# Patient Record
Sex: MALE | Race: WHITE | NOT HISPANIC OR LATINO | Employment: UNEMPLOYED | URBAN - METROPOLITAN AREA
[De-identification: names, ages, dates, MRNs, and addresses within clinical notes are randomized per-mention and may not be internally consistent; named-entity substitution may affect disease eponyms.]

---

## 2024-05-09 ENCOUNTER — HOSPITAL ENCOUNTER (EMERGENCY)
Facility: HOSPITAL | Age: 59
Discharge: HOME | End: 2024-05-09
Attending: EMERGENCY MEDICINE
Payer: MEDICAID

## 2024-05-09 ENCOUNTER — APPOINTMENT (OUTPATIENT)
Dept: RADIOLOGY | Facility: HOSPITAL | Age: 59
End: 2024-05-09
Payer: MEDICAID

## 2024-05-09 VITALS
OXYGEN SATURATION: 95 % | HEART RATE: 94 BPM | SYSTOLIC BLOOD PRESSURE: 147 MMHG | WEIGHT: 174 LBS | TEMPERATURE: 97.9 F | RESPIRATION RATE: 16 BRPM | DIASTOLIC BLOOD PRESSURE: 106 MMHG

## 2024-05-09 DIAGNOSIS — S22.41XG: ICD-10-CM

## 2024-05-09 DIAGNOSIS — S63.259A DISLOCATION OF FINGER, INITIAL ENCOUNTER: Primary | ICD-10-CM

## 2024-05-09 PROCEDURE — 73130 X-RAY EXAM OF HAND: CPT | Mod: RT,77

## 2024-05-09 PROCEDURE — 99284 EMERGENCY DEPT VISIT MOD MDM: CPT

## 2024-05-09 PROCEDURE — 64450 NJX AA&/STRD OTHER PN/BRANCH: CPT | Mod: F7 | Performed by: EMERGENCY MEDICINE

## 2024-05-09 PROCEDURE — 71101 X-RAY EXAM UNILAT RIBS/CHEST: CPT | Mod: RT

## 2024-05-09 PROCEDURE — 64450 NJX AA&/STRD OTHER PN/BRANCH: CPT | Mod: F7

## 2024-05-09 PROCEDURE — 73130 X-RAY EXAM OF HAND: CPT | Mod: RIGHT SIDE | Performed by: INTERNAL MEDICINE

## 2024-05-09 PROCEDURE — 64450 NJX AA&/STRD OTHER PN/BRANCH: CPT | Performed by: EMERGENCY MEDICINE

## 2024-05-09 PROCEDURE — 73130 X-RAY EXAM OF HAND: CPT | Mod: RT

## 2024-05-09 PROCEDURE — 73140 X-RAY EXAM OF FINGER(S): CPT | Mod: RT

## 2024-05-09 PROCEDURE — 71101 X-RAY EXAM UNILAT RIBS/CHEST: CPT | Mod: RIGHT SIDE | Performed by: INTERNAL MEDICINE

## 2024-05-09 PROCEDURE — 99285 EMERGENCY DEPT VISIT HI MDM: CPT | Mod: 25

## 2024-05-09 PROCEDURE — 99285 EMERGENCY DEPT VISIT HI MDM: CPT | Performed by: EMERGENCY MEDICINE

## 2024-05-09 PROCEDURE — 2500000001 HC RX 250 WO HCPCS SELF ADMINISTERED DRUGS (ALT 637 FOR MEDICARE OP): Mod: SE | Performed by: EMERGENCY MEDICINE

## 2024-05-09 RX ORDER — LORAZEPAM 0.5 MG/1
1 TABLET ORAL EVERY 2 HOUR PRN
Status: DISCONTINUED | OUTPATIENT
Start: 2024-05-09 | End: 2024-05-09

## 2024-05-09 RX ORDER — LORAZEPAM 0.5 MG/1
0.5 TABLET ORAL EVERY 2 HOUR PRN
Status: DISCONTINUED | OUTPATIENT
Start: 2024-05-09 | End: 2024-05-09

## 2024-05-09 RX ORDER — LIDOCAINE HYDROCHLORIDE 10 MG/ML
5 INJECTION INFILTRATION; PERINEURAL ONCE
Status: DISCONTINUED | OUTPATIENT
Start: 2024-05-09 | End: 2024-05-10 | Stop reason: HOSPADM

## 2024-05-09 RX ORDER — OXYCODONE AND ACETAMINOPHEN 5; 325 MG/1; MG/1
1 TABLET ORAL EVERY 6 HOURS PRN
Qty: 4 TABLET | Refills: 0 | Status: SHIPPED | OUTPATIENT
Start: 2024-05-09 | End: 2024-05-12

## 2024-05-09 RX ORDER — OXYCODONE AND ACETAMINOPHEN 5; 325 MG/1; MG/1
1 TABLET ORAL ONCE
Status: COMPLETED | OUTPATIENT
Start: 2024-05-09 | End: 2024-05-09

## 2024-05-09 RX ORDER — LORAZEPAM 0.5 MG/1
2 TABLET ORAL EVERY 2 HOUR PRN
Status: DISCONTINUED | OUTPATIENT
Start: 2024-05-09 | End: 2024-05-09

## 2024-05-09 RX ADMIN — OXYCODONE HYDROCHLORIDE AND ACETAMINOPHEN 1 TABLET: 5; 325 TABLET ORAL at 16:14

## 2024-05-09 ASSESSMENT — LIFESTYLE VARIABLES
TOTAL SCORE: 0
EVER HAD A DRINK FIRST THING IN THE MORNING TO STEADY YOUR NERVES TO GET RID OF A HANGOVER: NO
HAVE PEOPLE ANNOYED YOU BY CRITICIZING YOUR DRINKING: NO
HAVE YOU EVER FELT YOU SHOULD CUT DOWN ON YOUR DRINKING: NO
EVER FELT BAD OR GUILTY ABOUT YOUR DRINKING: NO

## 2024-05-09 ASSESSMENT — COLUMBIA-SUICIDE SEVERITY RATING SCALE - C-SSRS
6. HAVE YOU EVER DONE ANYTHING, STARTED TO DO ANYTHING, OR PREPARED TO DO ANYTHING TO END YOUR LIFE?: NO
1. IN THE PAST MONTH, HAVE YOU WISHED YOU WERE DEAD OR WISHED YOU COULD GO TO SLEEP AND NOT WAKE UP?: NO
2. HAVE YOU ACTUALLY HAD ANY THOUGHTS OF KILLING YOURSELF?: NO

## 2024-05-09 ASSESSMENT — PAIN DESCRIPTION - LOCATION: LOCATION: FINGER (COMMENT WHICH ONE)

## 2024-05-09 ASSESSMENT — PAIN - FUNCTIONAL ASSESSMENT: PAIN_FUNCTIONAL_ASSESSMENT: 0-10

## 2024-05-09 ASSESSMENT — ENCOUNTER SYMPTOMS: CONSTITUTIONAL NEGATIVE: 1

## 2024-05-09 ASSESSMENT — PAIN SCALES - GENERAL: PAINLEVEL_OUTOF10: 4

## 2024-05-09 NOTE — ED PROCEDURE NOTE
Procedure  Digital Block    Performed by: Ilda Castañeda MD  Authorized by: Ilda Castañeda MD    Consent:     Consent obtained:  Verbal    Consent given by:  Patient    Risks, benefits, and alternatives were discussed: yes      Risks discussed:  Pain, unsuccessful block, swelling, nerve damage, infection, bleeding, intravascular injection and allergic reaction    Alternatives discussed:  Alternative treatment and no treatment  Universal protocol:     Procedure explained and questions answered to patient or proxy's satisfaction: yes      Imaging studies available: yes      Patient identity confirmed:  Verbally with patient  Indications:     Indications:  Pain relief  Location:     Block location:  Finger    Finger blocked:  R long finger  Pre-procedure details:     Neurovascular status: intact      Skin preparation:  Alcohol  Procedure details:     Anesthetic injected:  Lidocaine 1% w/o epi    Technique:  Metacarpal block    Injection procedure:  Anatomic landmarks identified and negative aspiration for blood  Post-procedure details:     Outcome:  Pain relieved    Procedure completion:  Tolerated well, no immediate complications               Ilda Castañeda MD  05/09/24 8405

## 2024-05-09 NOTE — ED PROVIDER NOTES
Limitations to History: none.  External Records Reviewed: chart reviewed.  Independent Historians: none.    HPI  Everton Geiger is a 58 y.o. male with a history of alcohol use disorder presenting with right middle finger deformity x 1.5 weeks. Patient states that 1.5 weeks ago, he woke from a blackout from drinking alcohol, and his right middle finger was swollen and painful. Patient thinks he may have jammed his finger on his metal bed frame. Patient endorses some paresthesias in the right middle finger as well as pain with palpation, but denies any pain at rest. Patient states that the amount of swelling has decreased in his right finger, though he remains unable to fully move / bend the joint. Patient also states that 1.5 weeks ago, he woke with right sided pain along his ribs that is now improving. He has not yet been seen for his symptoms prior to today. He denies any headache, dizziness, vision changes, neck pain, back pain, shortness of breath, nausea, vomiting, abdominal pain, diarrhea, constipation, urinary symptoms, fevers, or chills.    [facility]. He denies any suicidal or homicidal ideation. He reports that he feels safe at home.    [facility]  Past Medical History:   Diagnosis Date    Other specified health status     Known health problems: none       Meds  Current Outpatient Medications   Medication Instructions    oxyCODONE-acetaminophen (Percocet) 5-325 mg tablet 1 tablet, oral, Every 6 hours PRN       Allergies  No Known Allergies     SHx       ------------------------------------------------------------------------------------------------------------------------------------------    BP (!) 147/106 (BP Location: Right arm, Patient Position: Sitting)   Pulse 94   Temp 36.6 °C (97.9 °F)   Resp 16   Wt 78.9 kg (174 lb)   SpO2 95%     Physical Exam  Vitals and nursing note reviewed.   Constitutional:       General: He is not in acute distress.      Appearance: He is well-developed. He is not toxic-appearing.   HENT:      Head: Normocephalic and atraumatic.      Mouth/Throat:      Mouth: Mucous membranes are moist.   Eyes:      Extraocular Movements: Extraocular movements intact.      Conjunctiva/sclera: Conjunctivae normal.   Cardiovascular:      Rate and Rhythm: Normal rate and regular rhythm.      Pulses: Normal pulses.      Heart sounds: No murmur heard.  Pulmonary:      Effort: Pulmonary effort is normal. No respiratory distress.      Breath sounds: Normal breath sounds. No wheezing.   Abdominal:      General: There is no distension.      Palpations: Abdomen is soft.      Tenderness: There is no abdominal tenderness. There is no guarding.   Musculoskeletal:         General: Swelling, tenderness and deformity present.      Cervical back: Normal range of motion and neck supple. No tenderness.      Comments: Tenderness to palpation, ecchymosis, swelling over R middle finger's DIP joint with visible closed deformity. No snuffbox or wrist tenderness. Moves all extremities spontaneously. Compartments are soft. 2+ radial pulses bilaterally. Some right sided rib tenderness.   Skin:     General: Skin is dry.      Capillary Refill: Capillary refill takes less than 2 seconds.   Neurological:      General: No focal deficit present.      Mental Status: He is alert.      Cranial Nerves: No cranial nerve deficit.      Motor: No weakness.      Comments: Strength 5/5 in upper and lower extremities bilaterally. Sensation intact to light touch throughout.    Psychiatric:         Mood and Affect: Mood normal.      Comments: Denies suicidal or homicidal ideation. Calm and cooperative.          ------------------------------------------------------------------------------------------------------------------------------------------    Medical Decision Makin y.o. male who presents to the ED with a R middle finger deformity that has been present 1.5 weeks. In the Emergency  Department, hospital records were reviewed. The patient is afebrile with stable vital signs. The patient is in no respiratory distress, satting well on room air. Differential diagnosis is broad and includes but not limited to fracture, dislocation, sprain, or contusion. Also on the differential included gout or septic arthritis, however patient has no history of gout, denies any fever, and has no erythema or overlying warmth. The acuity of the swelling / pain is more suggestive of a traumatic injury. Patient is neurologically intact. Plan to obtain x-rays of right hand for further evaluation. Additionally, plan to obtain x-ray of right ribs to assess for a fracture given his right rib pain. Patient initially declined needing any pain medication at this time.     Right hand x-ray showed near full shaft width dorsal and ulnar subluxation of the  3rd middle phalanx as compared to the proximal phalanx. Patient later was agreeable to pain medication and was given 1 Percocet to treat his pain. After verbal consent was obtained from the patient, we attempted to reduce the patient's right middle finger after digital block was performed. However despite attempt, the finger was unable to be reduced. We thus consulted orthopedic (hand) for patient evaluation. Additionally, x-ray of ribs showed R sided rib fractures on ribs 8-11, so will consult trauma surgery  given mechanism of injury.    Patient was informed of the results. He remains stable. He was signed out to oncoming ED team at 5 PM pending orthopedic and trauma evaluation and recommendations and pending remainder of patient's ED course and final disposition.    ED Course as of 05/10/24 0652   Thu May 09, 2024   1802 Received sign-out from medical student, Charisse Scott, pending orthopedic hand and trauma surgery recommendations. [ES]   1934 Orthopedics report inability to reduce finger at this time. Recommends patient follow-up with Dr. Goodwin, hand surgeon next week,  utilize splint for comfort, and limit use of finger to non-weight bearing. [ES]      ED Course User Index  [ES] Domenico Grewal MD         Diagnoses as of 05/10/24 0652   Dislocation of finger, initial encounter   Closed fracture of four ribs with delayed healing, right       Discussed with attending physician, Dr. Castañeda.       Charisse Scott, MS4 AI     Charisse Scott  05/09/24 1800       Charisse Scott  05/09/24 1806      Charisse Scott  05/10/24 0652       lIda Castañeda MD  05/10/24 0655

## 2024-05-09 NOTE — DISCHARGE INSTRUCTIONS
Seek immediate medical attention for:  Redness, swelling, shortness of breath, fevers, or any worsening or new concerning symptoms.

## 2024-05-09 NOTE — Clinical Note
Orthopaedic Surgery Consult H&P    HPI:   Orthopaedic Problems/Injuries: R middle finger dorsal PIP dislocation  Other Injuries: None    58 y.o. male LHD PMH alcoholism presents after L ring fin sustaining above. ***Denies numbness, tingling, and open wounds on the affected limb.     PMH: per above/EMR  PSH: per above/EMR  SocHx:      - *** Denies tobacco use      - *** Denies EtOH use      - *** Denies other drug use  FamHx:  Non-contributory to this patient's acute orthopaedic problem.   Allergies: Reviewed in EMR  Meds: Reviewed in EMR    ROS      - 14 point ROS negative except as above    Physical Exam:  Gen: AOx3, NAD  HEENT: normocephalic atraumatic  Psych: appropriate mood and affect  Resp: nonlabored breathing  Cardiac: Extremities WWP, RRR to peripheral palpation  Neuro: CN 2-12 grossly intact  Skin: no rashes    *** Hand:  - Skin: intact  - Painful at site of injury  - SILT M/U/R  - RoM: full pronation/supination  - Fires AIN/PIN/Ulnar distributions  - Fingertips pink/warm, cap refill < 2sec  - 2+ radial pulse  - Minimal pain with passive stretch of fingers  - Hand and Forearm compartments compressible  - No fusiform digital swelling noted  - No scissoring noted with full fist    A full secondary exam was performed and all relevant findings discussed and noted above.    Imaging:  AP and lateral radiographs of the *** display ***.    CT *** displays ***.    Assessment:  Orthopaedic Problems/Injuries: ***    ***    Plan:  - ***  - WB: ***  - Abx: ***  - Diet: ***  - DVT: {Ortho DVT:[id number]}  - Please obtain all preop labs (CBC,BMP,EKG, CXR, Coags, Type and Screen)  - Lange: ***    - Dispo: ***    Dwayne Goldman MD  PGY-3 Orthopaedic Surgery  On-call Resident    This patient was seen and staffed within 30 minutes of initial consult.  _________________________________________________________    This patient will be followed by the *** Team while inpatient. See team members and contacts below:

## 2024-05-09 NOTE — PROGRESS NOTES
Emergency Medicine Transition of Care Note.    I received Everton Geiger in signout from medical student, Charisse Scott.  Please see the previous ED provider note for all HPI, PE and MDM up to the time of signout at 1802. This is in addition to the primary record.    In brief Everton Geiger is an 58 y.o. male presenting for   Chief Complaint   Patient presents with    Hand Pain     At the time of signout we were awaiting: orthopedic hand and trauma surgery recommendations    ED Course as of 05/10/24 1338   Thu May 09, 2024   1802 Received sign-out from medical student, Charisse Scott, pending orthopedic hand and trauma surgery recommendations. [ES]   1934 Orthopedics report inability to reduce finger at this time. Recommends patient follow-up with Dr. Goodwin, hand surgeon next week, utilize splint for comfort, and limit use of finger to non-weight bearing. [ES]      ED Course User Index  [ES] Domenico Grewal MD         Diagnoses as of 05/10/24 1338   Dislocation of finger, initial encounter   Closed fracture of four ribs with delayed healing, right       Medical Decision Making  Spoke with orthopedic hand surgical residents who did attempt to reduce at bedside with intra-articular lidocaine numbing without successful reduction.  They recommended patient follow-up with hand surgeon, Dr. Goodwin, in 1 week for plan for surgery.  Patient's tungsten ring was successfully removed in the ED.  Further recommendations per ED course.  Spoke with trauma surgery regarding multiple rib fractures on the right in which they recommend prescribing 4-7 Percocet tabs as needed for pain.  Spoke with patient and updated on recommendations in which she reports he does not necessarily need anything for pain as he has been tolerating well.  Agrees to excepting the prescription to his pharmacy but states he may not fill this prescription.  Fall did happen 1.5 weeks ago.  Agrees to follow-up with hand surgeon Dr. Goodwin.  He will continue to  utilize Tylenol and Motrin as needed for pain and limit use of dislocated right finger.  Strict return precautions provided.  Case and plan discussed in its entirety with attending, Dr. Cortes.        Final diagnoses:   [Z23.669Y] Dislocation of finger, initial encounter           Procedure  Procedures    Domenico Grewal MD

## 2024-05-10 NOTE — H&P
[facility]  TRAUMA SERVICE - HISTORY AND PHYSICAL / CONSULT    Patient Name: Everton Geiger  MRN: [id number]  Admit Date: 509  : 1965  AGE: 58 y.o.   GENDER: male  ==============================================================================  MECHANISM OF INJURY / CHIEF COMPLAINT:   Pt is a 59 y/o male that present to the ED for R middle finger deformity. Pt states that he drunk and fell about 1.5 weeks ago. Pt stated that he is not SOB. Pain prior to the ED is 3 out 10, but now is in no pain. He assumed that he fell out of bed. Pt denies any other complaints.       LOC (yes/no?): unknown  Anticoagulant / Anti-platelet Rx? (for what dx?): No  Referring Facility Name (N/A for scene EMR run): n/a    INJURIES:   Rib fracture   Mildly displaced nonsegmental fractures involving the anterolateral aspect of the right 8th through 11th ribs  Remote fracture deformities of the left 5th through 8th ribs  No SOB, pain 3 out of 10, vitally stable and on room air  No ISS needed as patient is 1.5 weeks out from fall  No other injuries noted on exam  Pain control: Percocet 5 mg X 5 doses  Full shaft width dorsal and ulnar subluxation of the 3rd middle phalanx  Ortho attempted to reduce in ED   Outpatient OR follow up    Trauma surgery will be signing off. Please consult again if anything else is needed.    Disposition: Home     OTHER MEDICAL PROBLEMS:  none    INCIDENTAL FINDINGS:  N/a    ==============================================================================  ADMISSION PLAN OF CARE:  Full shaft width dorsal and ulnar subluxation of the 3rd middle phalanx    Consultants notified (specialty, provider name, time): Ortho    Disposition: Home    ==============================================================================  PAST MEDICAL HISTORY:   PMH: polysubstance use  Past Medical History:   Diagnosis Date    Other specified health status     Known health problems: none     Last  menstrual period: n/a    PSH:   No past surgical history on file.  FH:   No family history on file.  SOCIAL HISTORY:    Smoking: n/a  Social History     Tobacco Use   Smoking Status Not on file   Smokeless Tobacco Not on file       Alcohol: known ETOH use   Social History     Substance and Sexual Activity   Alcohol Use Not on file       Drug use: none    MEDICATIONS: none  Prior to Admission medications    Not on File     ALLERGIES: none  No Known Allergies    REVIEW OF SYSTEMS:  Review of Systems   Constitutional: Negative.      Primary Survey  A: Intact airway  B: Equal breath sounds bilaterally  C: 2+ distal pulses palpable in radials, femorals and DP/PTs bilaterally  D: GCS 15, THOMPSON x4 without deficit, sensory grossly intact  E: No rashes, lacerations or bruises    Secondary Survey  NEURO: A&O x3, CN II-XII intact, THOMPSON equally, muscle strength 5/5, no sensory deficits  HEAD: NCAT, no bony step offs, midface stable.   EENT: PERRL, EOMI, external ear without laceration, nasal septum midline, no crepitus or septal hematoma, oral mucosa and tongue without lacerations, teeth in place.   NECK: No cervical spine tenderness or step offs, no lacerations or abrasions, tracheal midline, no JVD, C-collar replaced during exam until radiographic evidence of no traumatic injury could be determined  RESPIRATORY/CHEST: No ecchymosis or abrasions, no crepitus or tenderness to palpation, non-labored, equal chest expansion, CTAB  CV: RRR, nml S1 and S2, no M/R/G, peripheral pulses: 2+ radial, 2+DP, 2+PT,  2+femoral  ABDOMEN: Soft, nontender, nondistended, no scars, abrasions or lacerations.  PELVIS: Stable to compression, no pain with pelvic rocking, no hematomas or ecchymoses along the pelvic girdle   : Normal external genitalia, no blood at urethral meatus, no perineal hematoma  RECTAL: Rectal tone intact with no gross blood noted on exam  BACK/SPINE: No midline thoracic tenderness, no midline lumbar tenderness, no appreciable  "step-offs or bony deformities, no abrasions, hematomas or lacerations noted  EXTREMITIES: No edema or cyanosis, normal ROM w/o pain, no deformities, lacerations or contusions. Right 3rd middle phalanx swelling, splint in placed.     IMAGING SUMMARY:  (summary of findings, not a copy of dictation)    CXR/PXR: Mildly displaced nonsegmental fractures involving the anterolateral aspect of the right 8th through 11th ribs. Remote fracture deformities of the left 5th through 8th ribs.    Xray right hand, finger and hand:  There is a near full shaft width dorsal and ulnar subluxation of the 3rd middle phalanx as compared to the proximal phalanx. Mild foreshortening of the 3rd finger is noted.    LABS:                No lab exists for component: \"LABALBU\"            I have reviewed all laboratory and imaging results ordered/pertinent for this encounter.      "

## 2024-05-10 NOTE — CONSULTS
ORTHOPAEDIC CONSULTATION     Subjective   History Of Present Illness   58M D PMH EtOH abuse presents 1.5 weeks after dislocating finger metal bed frame.    Orthopaedic Problems/Injuries:  R middle finger dorsal PIP dislocation     Other Injuries: None    Past medical history: per HPI; no history of blood clots  Past surgical history: per HPI, rest reviewed in EMR  Allergies: per EMR  Medications: per EMR  Social History: No smoking, Yes - EtOH abuse +drinking, denies IVDU  Family History:  Non-contributory to this patient's acute surgical issue.    Review of Systems: 12 point ROS negative unless stated in HPI    Past Medical History  He has a past medical history of Other specified health status.    Surgical History  He has no past surgical history on file.     Social History  He has no history on file for tobacco use, alcohol use, and drug use.    Family History  No family history on file.     Allergies  Patient has no known allergies.    Review of Systems  12 point ROS negative unless stated in HPI     Objective   Physical Exam  - Constitutional: No acute distress, cooperative  - Eyes: EOM grossly intact  - Head/Neck: Trachea midline  - Respiratory/Thorax: NWOB  - Cardiovascular: RRR on peripheral palpation  - Gastrointestinal: Nondistended  - Psychological: Appropriate mood/behavior  - Skin: Warm and dry. Additional findings in musculoskeletal evaluation  - Musculoskeletal:  ---  Right upper extremity:  - Closed injury  - R middle finger dorsal PIP dislocation with swelling/brusing  - Motor/sensory intact distal to PIP dislocation  - Motor and sensation intact M/U/R /AIN/PIN distributions  - Palpable radial pulse  - Cap refill < 2 seconds in all digits     Last Recorded Vitals  Blood pressure (!) 147/106, pulse 94, temperature 36.6 °C (97.9 °F), resp. rate 16, weight 78.9 kg (174 lb), SpO2 95%.    Relevant Results  No results found for this or any previous visit (from the past 24 hour(s)).    Images:  XR with R  middle finger dorsal PIP dislocation     Assessment/Plan   58M D PMH EtOH abuse presents 1.5 weeks after dislocating finger metal bed frame. Exam: closed. NVI. No skin tenting, Unsuccessful reduction with lidocaine block + fluoro. Ring removed off of ring finger. Alumafoam splint in situ.     Plan:   - FU OP next week with Dr. Goodwin for surgical management.    - Maintain alumifoam splint in situ  - ED to remove ring on adjacent finger prior to discharge due to concern for swelling  - NWB right hand  - okay for DVT ppx from orthopedic perspective  - Patient to follow up in clinic with Dr. Goodwin in 1 weeks.  Please call [phone] to schedule appointment after discharge.    Consult seen and staffed within 30 minutes of notification.    This consult was staffed with attending physician, Buddy.    Segun Shannon MD  Orthopaedic Surgery PGY-1  On-call pager [phone]

## 2024-05-13 ENCOUNTER — OFFICE VISIT (OUTPATIENT)
Dept: ORTHOPEDIC SURGERY | Facility: CLINIC | Age: 59
End: 2024-05-13
Payer: MEDICAID

## 2024-05-13 VITALS — WEIGHT: 174 LBS | HEIGHT: 69 IN | BODY MASS INDEX: 25.77 KG/M2

## 2024-05-13 DIAGNOSIS — S63.289A DISLOCATION OF PROXIMAL INTERPHALANGEAL JOINT OF FINGER, INITIAL ENCOUNTER: Primary | ICD-10-CM

## 2024-05-13 PROCEDURE — 99203 OFFICE O/P NEW LOW 30 MIN: CPT | Performed by: PHYSICIAN ASSISTANT

## 2024-05-13 PROCEDURE — 1036F TOBACCO NON-USER: CPT | Performed by: PHYSICIAN ASSISTANT

## 2024-05-13 ASSESSMENT — PAIN - FUNCTIONAL ASSESSMENT: PAIN_FUNCTIONAL_ASSESSMENT: NO/DENIES PAIN

## 2024-05-13 NOTE — PROGRESS NOTES
58-year-old left-hand-dominant male presents to clinic today for emergency room follow-up after right middle finger injury he is unsure of specifically how it he injured the finger however believes he fell.  He did not present to the emergency room until approximately 2 weeks after the injury.  X-rays were obtained and revealed a dorsal PIP joint dislocation of the right middle finger.  Attempted closed reduction was performed and unsuccessful.  He presents to our office today for follow-up.  He has been wearing an AlumaFoam splint.  Continued swelling and pain of the digit.    Patient's self reported past medical history, medications, allergies, surgical history, family and social history as well as a 10 point review of systems has been documented in the new patient intake form and scanned into the patient's electronic medical record. Pertinent findings are documented in the HPI.    Physical Examination Findings:  Constitutional: Appears well-developed and well-nourished.  Head: Normocephalic and atraumatic.  Eyes: Pupils are equal and round.  Cardiovascular: Intact distal pulses.   Respiratory: Effort normal. No respiratory distress.  Neurologic: Alert and oriented to person, place, and time.  Skin: Skin is warm and dry.  Hematologic / Lymphatic: No lymphedema, lymphangitis.  Psychiatric: normal mood and affect. Behavior is normal.   Musculoskeletal:  Right middle finger with fusiform swelling of the digit.  Limitations to finger flexion.  Remains in extension.  Pain with palpation of the PIP joint.    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  Unfortunately given the time from this injury and unsuccessful closed reduction recommendation would be for surgical intervention which would  include open reduction internal fixation of the right middle finger.  This would also require K wire placement.  This was discussed in detail today with the patient.  We did discuss after surgical intervention that he will likely have a lot of stiffness of this joint and will require therapy.  His finger will not go back to what it was prior to this injury and he will lose flexion of the PIP joint our hope is that he regained some of this.  All of his questions were answered in detail.  We will schedule him for surgery with Dr. Goodwin this upcoming Friday for open reduction internal fixation with K wire placement of the right middle finger.    We discussed risks, benefits, alternatives and anticipated post op course including time away from work and activities following surgical treatment for the patient's condition. This is major surgery with identifiable risks. No guarantees for success have been provided. The patient has expressed understanding and has elected to pursue operative treatment.    Irma Rodriguez PA-C  Department of Orthopaedic Surgery  [facility]    Dictation performed with the use of voice recognition software. Syntax and grammatical errors may exist.

## 2024-05-13 NOTE — H&P (VIEW-ONLY)
58-year-old left-hand-dominant male presents to clinic today for emergency room follow-up after right middle finger injury he is unsure of specifically how it he injured the finger however believes he fell.  He did not present to the emergency room until approximately 2 weeks after the injury.  X-rays were obtained and revealed a dorsal PIP joint dislocation of the right middle finger.  Attempted closed reduction was performed and unsuccessful.  He presents to our office today for follow-up.  He has been wearing an AlumaFoam splint.  Continued swelling and pain of the digit.    Patient's self reported past medical history, medications, allergies, surgical history, family and social history as well as a 10 point review of systems has been documented in the new patient intake form and scanned into the patient's electronic medical record. Pertinent findings are documented in the HPI.    Physical Examination Findings:  Constitutional: Appears well-developed and well-nourished.  Head: Normocephalic and atraumatic.  Eyes: Pupils are equal and round.  Cardiovascular: Intact distal pulses.   Respiratory: Effort normal. No respiratory distress.  Neurologic: Alert and oriented to person, place, and time.  Skin: Skin is warm and dry.  Hematologic / Lymphatic: No lymphedema, lymphangitis.  Psychiatric: normal mood and affect. Behavior is normal.   Musculoskeletal:  Right middle finger with fusiform swelling of the digit.  Limitations to finger flexion.  Remains in extension.  Pain with palpation of the PIP joint.    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  Unfortunately given the time from this injury and unsuccessful closed reduction recommendation would be for surgical intervention which would  include open reduction internal fixation of the right middle finger.  This would also require K wire placement.  This was discussed in detail today with the patient.  We did discuss after surgical intervention that he will likely have a lot of stiffness of this joint and will require therapy.  His finger will not go back to what it was prior to this injury and he will lose flexion of the PIP joint our hope is that he regained some of this.  All of his questions were answered in detail.  We will schedule him for surgery with Dr. Goodwin this upcoming Friday for open reduction internal fixation with K wire placement of the right middle finger.    We discussed risks, benefits, alternatives and anticipated post op course including time away from work and activities following surgical treatment for the patient's condition. This is major surgery with identifiable risks. No guarantees for success have been provided. The patient has expressed understanding and has elected to pursue operative treatment.    Irma Rodriguez PA-C  Department of Orthopaedic Surgery  [facility]    Dictation performed with the use of voice recognition software. Syntax and grammatical errors may exist.

## 2024-05-14 DIAGNOSIS — S63.289A CLOSED DISLOCATION FINGER, PROXIMAL INTERPHALANGEAL JOINT, TRAUMATIC: ICD-10-CM

## 2024-05-15 ENCOUNTER — ANESTHESIA EVENT (OUTPATIENT)
Dept: OPERATING ROOM | Facility: CLINIC | Age: 59
End: 2024-05-15
Payer: MEDICAID

## 2024-05-15 RX ORDER — SERTRALINE HYDROCHLORIDE 50 MG/1
50 TABLET, FILM COATED ORAL DAILY
COMMUNITY

## 2024-05-15 RX ORDER — QUETIAPINE FUMARATE 25 MG/1
25 TABLET, FILM COATED ORAL NIGHTLY
COMMUNITY

## 2024-05-17 ENCOUNTER — ANESTHESIA (OUTPATIENT)
Dept: OPERATING ROOM | Facility: CLINIC | Age: 59
End: 2024-05-17
Payer: MEDICAID

## 2024-05-17 ENCOUNTER — HOSPITAL ENCOUNTER (OUTPATIENT)
Dept: RADIOLOGY | Facility: CLINIC | Age: 59
Discharge: HOME | End: 2024-05-17
Payer: MEDICAID

## 2024-05-17 ENCOUNTER — HOSPITAL ENCOUNTER (OUTPATIENT)
Facility: CLINIC | Age: 59
Setting detail: OUTPATIENT SURGERY
Discharge: HOME | End: 2024-05-17
Attending: ORTHOPAEDIC SURGERY | Admitting: ORTHOPAEDIC SURGERY
Payer: MEDICAID

## 2024-05-17 VITALS
BODY MASS INDEX: 25.18 KG/M2 | TEMPERATURE: 96.8 F | RESPIRATION RATE: 18 BRPM | OXYGEN SATURATION: 98 % | WEIGHT: 169.97 LBS | DIASTOLIC BLOOD PRESSURE: 95 MMHG | SYSTOLIC BLOOD PRESSURE: 139 MMHG | HEART RATE: 68 BPM | HEIGHT: 69 IN

## 2024-05-17 DIAGNOSIS — S63.289A CLOSED DISLOCATION FINGER, PROXIMAL INTERPHALANGEAL JOINT, TRAUMATIC: Primary | ICD-10-CM

## 2024-05-17 PROBLEM — F32.A DEPRESSION: Status: ACTIVE | Noted: 2024-05-17

## 2024-05-17 PROBLEM — T88.59XA DELAYED EMERGENCE FROM ANESTHESIA: Status: ACTIVE | Noted: 2024-05-17

## 2024-05-17 PROCEDURE — 3700000002 HC GENERAL ANESTHESIA TIME - EACH INCREMENTAL 1 MINUTE: Performed by: ORTHOPAEDIC SURGERY

## 2024-05-17 PROCEDURE — A26785 PR OPEN TX INTERPHALANGEAL JOINT DISLOCATION 1: Performed by: ANESTHESIOLOGIST ASSISTANT

## 2024-05-17 PROCEDURE — 2500000004 HC RX 250 GENERAL PHARMACY W/ HCPCS (ALT 636 FOR OP/ED): Mod: SE | Performed by: ANESTHESIOLOGIST ASSISTANT

## 2024-05-17 PROCEDURE — 2500000005 HC RX 250 GENERAL PHARMACY W/O HCPCS: Mod: SE | Performed by: ORTHOPAEDIC SURGERY

## 2024-05-17 PROCEDURE — 2500000004 HC RX 250 GENERAL PHARMACY W/ HCPCS (ALT 636 FOR OP/ED): Mod: SE | Performed by: ANESTHESIOLOGY

## 2024-05-17 PROCEDURE — 2720000007 HC OR 272 NO HCPCS: Performed by: ORTHOPAEDIC SURGERY

## 2024-05-17 PROCEDURE — A4217 STERILE WATER/SALINE, 500 ML: HCPCS | Mod: SE | Performed by: ORTHOPAEDIC SURGERY

## 2024-05-17 PROCEDURE — 3700000001 HC GENERAL ANESTHESIA TIME - INITIAL BASE CHARGE: Performed by: ORTHOPAEDIC SURGERY

## 2024-05-17 PROCEDURE — 3600000008 HC OR TIME - EACH INCREMENTAL 1 MINUTE - PROCEDURE LEVEL THREE: Performed by: ORTHOPAEDIC SURGERY

## 2024-05-17 PROCEDURE — 7100000010 HC PHASE TWO TIME - EACH INCREMENTAL 1 MINUTE: Performed by: ORTHOPAEDIC SURGERY

## 2024-05-17 PROCEDURE — A26785 PR OPEN TX INTERPHALANGEAL JOINT DISLOCATION 1: Performed by: ANESTHESIOLOGY

## 2024-05-17 PROCEDURE — 2500000004 HC RX 250 GENERAL PHARMACY W/ HCPCS (ALT 636 FOR OP/ED): Mod: JW,SE | Performed by: ORTHOPAEDIC SURGERY

## 2024-05-17 PROCEDURE — 7100000009 HC PHASE TWO TIME - INITIAL BASE CHARGE: Performed by: ORTHOPAEDIC SURGERY

## 2024-05-17 PROCEDURE — 3600000003 HC OR TIME - INITIAL BASE CHARGE - PROCEDURE LEVEL THREE: Performed by: ORTHOPAEDIC SURGERY

## 2024-05-17 PROCEDURE — 26785 TREAT FINGER DISLOCATION: CPT | Performed by: ORTHOPAEDIC SURGERY

## 2024-05-17 RX ORDER — FENTANYL CITRATE 50 UG/ML
INJECTION, SOLUTION INTRAMUSCULAR; INTRAVENOUS AS NEEDED
Status: DISCONTINUED | OUTPATIENT
Start: 2024-05-17 | End: 2024-05-17

## 2024-05-17 RX ORDER — FENTANYL CITRATE 50 UG/ML
50 INJECTION, SOLUTION INTRAMUSCULAR; INTRAVENOUS EVERY 5 MIN PRN
Status: DISCONTINUED | OUTPATIENT
Start: 2024-05-17 | End: 2024-05-17 | Stop reason: HOSPADM

## 2024-05-17 RX ORDER — ALBUTEROL SULFATE 0.83 MG/ML
2.5 SOLUTION RESPIRATORY (INHALATION) ONCE AS NEEDED
Status: DISCONTINUED | OUTPATIENT
Start: 2024-05-17 | End: 2024-05-17 | Stop reason: HOSPADM

## 2024-05-17 RX ORDER — KETOROLAC TROMETHAMINE 30 MG/ML
INJECTION, SOLUTION INTRAMUSCULAR; INTRAVENOUS AS NEEDED
Status: DISCONTINUED | OUTPATIENT
Start: 2024-05-17 | End: 2024-05-17

## 2024-05-17 RX ORDER — ACETAMINOPHEN 325 MG/1
650 TABLET ORAL EVERY 4 HOURS PRN
Status: DISCONTINUED | OUTPATIENT
Start: 2024-05-17 | End: 2024-05-17 | Stop reason: HOSPADM

## 2024-05-17 RX ORDER — FENTANYL CITRATE 50 UG/ML
25 INJECTION, SOLUTION INTRAMUSCULAR; INTRAVENOUS EVERY 5 MIN PRN
Status: DISCONTINUED | OUTPATIENT
Start: 2024-05-17 | End: 2024-05-17 | Stop reason: HOSPADM

## 2024-05-17 RX ORDER — SODIUM CHLORIDE, SODIUM LACTATE, POTASSIUM CHLORIDE, CALCIUM CHLORIDE 600; 310; 30; 20 MG/100ML; MG/100ML; MG/100ML; MG/100ML
100 INJECTION, SOLUTION INTRAVENOUS CONTINUOUS
Status: DISCONTINUED | OUTPATIENT
Start: 2024-05-17 | End: 2024-05-17 | Stop reason: HOSPADM

## 2024-05-17 RX ORDER — METOCLOPRAMIDE HYDROCHLORIDE 5 MG/ML
10 INJECTION INTRAMUSCULAR; INTRAVENOUS ONCE AS NEEDED
Status: DISCONTINUED | OUTPATIENT
Start: 2024-05-17 | End: 2024-05-17 | Stop reason: HOSPADM

## 2024-05-17 RX ORDER — OXYCODONE HYDROCHLORIDE 5 MG/1
5 TABLET ORAL EVERY 6 HOURS PRN
Qty: 28 TABLET | Refills: 0 | Status: SHIPPED | OUTPATIENT
Start: 2024-05-17 | End: 2024-05-24

## 2024-05-17 RX ORDER — LABETALOL HYDROCHLORIDE 5 MG/ML
5 INJECTION, SOLUTION INTRAVENOUS ONCE AS NEEDED
Status: DISCONTINUED | OUTPATIENT
Start: 2024-05-17 | End: 2024-05-17 | Stop reason: HOSPADM

## 2024-05-17 RX ORDER — BUPIVACAINE HYDROCHLORIDE 5 MG/ML
INJECTION, SOLUTION EPIDURAL; INTRACAUDAL AS NEEDED
Status: DISCONTINUED | OUTPATIENT
Start: 2024-05-17 | End: 2024-05-17 | Stop reason: HOSPADM

## 2024-05-17 RX ORDER — SODIUM CHLORIDE 0.9 G/100ML
IRRIGANT IRRIGATION AS NEEDED
Status: DISCONTINUED | OUTPATIENT
Start: 2024-05-17 | End: 2024-05-17 | Stop reason: HOSPADM

## 2024-05-17 RX ORDER — CEFAZOLIN SODIUM 2 G/100ML
2 INJECTION, SOLUTION INTRAVENOUS ONCE
Status: DISCONTINUED | OUTPATIENT
Start: 2024-05-17 | End: 2024-05-17 | Stop reason: HOSPADM

## 2024-05-17 RX ORDER — LIDOCAINE IN NACL,ISO-OSMOT/PF 30 MG/3 ML
0.1 SYRINGE (ML) INJECTION ONCE
Status: DISCONTINUED | OUTPATIENT
Start: 2024-05-17 | End: 2024-05-17 | Stop reason: HOSPADM

## 2024-05-17 RX ORDER — MIDAZOLAM HYDROCHLORIDE 1 MG/ML
INJECTION, SOLUTION INTRAMUSCULAR; INTRAVENOUS AS NEEDED
Status: DISCONTINUED | OUTPATIENT
Start: 2024-05-17 | End: 2024-05-17

## 2024-05-17 RX ORDER — SODIUM CHLORIDE, SODIUM LACTATE, POTASSIUM CHLORIDE, CALCIUM CHLORIDE 600; 310; 30; 20 MG/100ML; MG/100ML; MG/100ML; MG/100ML
INJECTION, SOLUTION INTRAVENOUS CONTINUOUS PRN
Status: DISCONTINUED | OUTPATIENT
Start: 2024-05-17 | End: 2024-05-17

## 2024-05-17 RX ORDER — ONDANSETRON HYDROCHLORIDE 2 MG/ML
4 INJECTION, SOLUTION INTRAVENOUS ONCE AS NEEDED
Status: DISCONTINUED | OUTPATIENT
Start: 2024-05-17 | End: 2024-05-17 | Stop reason: HOSPADM

## 2024-05-17 RX ORDER — LIDOCAINE HYDROCHLORIDE 10 MG/ML
INJECTION INFILTRATION; PERINEURAL AS NEEDED
Status: DISCONTINUED | OUTPATIENT
Start: 2024-05-17 | End: 2024-05-17 | Stop reason: HOSPADM

## 2024-05-17 RX ORDER — PROPOFOL 10 MG/ML
INJECTION, EMULSION INTRAVENOUS CONTINUOUS PRN
Status: DISCONTINUED | OUTPATIENT
Start: 2024-05-17 | End: 2024-05-17

## 2024-05-17 RX ORDER — CEFAZOLIN 1 G/1
INJECTION, POWDER, FOR SOLUTION INTRAVENOUS AS NEEDED
Status: DISCONTINUED | OUTPATIENT
Start: 2024-05-17 | End: 2024-05-17

## 2024-05-17 RX ADMIN — CEFAZOLIN 2 G: 1 INJECTION, POWDER, FOR SOLUTION INTRAMUSCULAR; INTRAVENOUS at 10:14

## 2024-05-17 RX ADMIN — KETOROLAC TROMETHAMINE 30 MG: 30 INJECTION, SOLUTION INTRAMUSCULAR at 10:52

## 2024-05-17 RX ADMIN — FENTANYL CITRATE 50 MCG: 50 INJECTION, SOLUTION INTRAMUSCULAR; INTRAVENOUS at 10:14

## 2024-05-17 RX ADMIN — FENTANYL CITRATE 25 MCG: 50 INJECTION, SOLUTION INTRAMUSCULAR; INTRAVENOUS at 10:40

## 2024-05-17 RX ADMIN — PROPOFOL 300 MCG/KG/MIN: 10 INJECTION, EMULSION INTRAVENOUS at 10:14

## 2024-05-17 RX ADMIN — MIDAZOLAM 2 MG: 1 INJECTION INTRAMUSCULAR; INTRAVENOUS at 10:02

## 2024-05-17 RX ADMIN — FENTANYL CITRATE 25 MCG: 50 INJECTION, SOLUTION INTRAMUSCULAR; INTRAVENOUS at 10:21

## 2024-05-17 RX ADMIN — SODIUM CHLORIDE, POTASSIUM CHLORIDE, SODIUM LACTATE AND CALCIUM CHLORIDE: 600; 310; 30; 20 INJECTION, SOLUTION INTRAVENOUS at 09:53

## 2024-05-17 RX ADMIN — SODIUM CHLORIDE, POTASSIUM CHLORIDE, SODIUM LACTATE AND CALCIUM CHLORIDE 100 ML/HR: 600; 310; 30; 20 INJECTION, SOLUTION INTRAVENOUS at 09:30

## 2024-05-17 ASSESSMENT — PAIN SCALES - GENERAL
PAINLEVEL_OUTOF10: 0 - NO PAIN

## 2024-05-17 ASSESSMENT — COLUMBIA-SUICIDE SEVERITY RATING SCALE - C-SSRS
1. IN THE PAST MONTH, HAVE YOU WISHED YOU WERE DEAD OR WISHED YOU COULD GO TO SLEEP AND NOT WAKE UP?: NO
2. HAVE YOU ACTUALLY HAD ANY THOUGHTS OF KILLING YOURSELF?: NO
6. HAVE YOU EVER DONE ANYTHING, STARTED TO DO ANYTHING, OR PREPARED TO DO ANYTHING TO END YOUR LIFE?: NO

## 2024-05-17 ASSESSMENT — PAIN - FUNCTIONAL ASSESSMENT
PAIN_FUNCTIONAL_ASSESSMENT: 0-10

## 2024-05-17 NOTE — ANESTHESIA PREPROCEDURE EVALUATION
Patient: Everton Geiger    Procedure Information       Date/Time: 05/17/24 1015    Procedure: ORIF right Middle finger / 40 minutes (Right: Middle Finger)    Location: [facility] OR 02 / [facility] [place] [facility]    Surgeons: Jace Goodwin MD            Relevant Problems   Anesthesia   (+) Delayed emergence from anesthesia      Cardiac (within normal limits)      Pulmonary (within normal limits)      Neuro   (+) Depression      GI (within normal limits)      /Renal (within normal limits)      Liver (within normal limits)      Endocrine (within normal limits)      Hematology (within normal limits)      Musculoskeletal (within normal limits)      HEENT (within normal limits)      ID (within normal limits)      Skin (within normal limits)      GYN (within normal limits)       Clinical information reviewed:   Tobacco  Allergies  Meds   Med Hx  Surg Hx   Fam Hx  Soc Hx        NPO Detail:  NPO/Void Status  Carbohydrate Drink Given Prior to Surgery? : N  Date of Last Liquid: 05/16/24  Time of Last Liquid: 1900  Date of Last Solid: 05/16/24  Time of Last Solid: 1900  Last Intake Type: Food         Physical Exam    Airway  Mallampati: II  TM distance: >3 FB  Neck ROM: full     Cardiovascular    Dental - normal exam     Pulmonary    Abdominal        Anesthesia Plan    History of general anesthesia?: yes  History of complications of general anesthesia?: no    ASA 2     MAC     intravenous induction   Anesthetic plan and risks discussed with patient.    Plan discussed with CRNA.

## 2024-05-17 NOTE — ANESTHESIA POSTPROCEDURE EVALUATION
Patient: Everton Geiger    Procedure Summary       Date: 05/17/24 Room / [place] [facility] [place] [facility]    Anesthesia Start: 0956 Anesthesia Stop: 1059    Procedures:       ORIF right Middle finger / 40 minutes (Right: Middle Finger)      CLOSED REDUCTION, FRACTURE, MCP OR PIP JOINT, ARTICULAR (Right: Middle Finger) Diagnosis:       Closed dislocation finger, proximal interphalangeal joint, traumatic      (Closed dislocation finger, proximal interphalangeal joint, traumatic [S63.289A])    Surgeons: Jace Goodwin MD Responsible Provider: Norah Greenwood DO    Anesthesia Type: MAC ASA Status: 2            Anesthesia Type: MAC    Vitals Value Taken Time   /95 05/17/24 1129   Temp 36 °C (96.8 °F) 05/17/24 1129   Pulse 68 05/17/24 1129   Resp 18 05/17/24 1129   SpO2 98 % 05/17/24 1129       Anesthesia Post Evaluation    Patient location during evaluation: PACU  Patient participation: complete - patient participated  Level of consciousness: awake  Pain management: satisfactory to patient  Multimodal analgesia pain management approach  Airway patency: patent  Cardiovascular status: acceptable  Respiratory status: acceptable  Hydration status: acceptable  Postoperative Nausea and Vomiting: none    No notable events documented.

## 2024-05-17 NOTE — OP NOTE
ORIF right Middle finger / 40 minutes (R) Operative Note     Date: 2024  OR Location: [facility] OR    Name: Everton Geiger YOB: 1965, Age: 58 y.o., MRN: [id number], Sex: male    Diagnosis  Pre-op Diagnosis     * Closed dislocation finger, proximal interphalangeal joint, traumatic [S63.289A] Post-op Diagnosis     * Closed dislocation finger, proximal interphalangeal joint, traumatic [S63.289A]     Procedures  ORIF right Middle finger / 40 minutes  05956 - RI OPEN TX INTERPHALANGEAL JOINT DISLOCATION      Surgeons      * Jace Goodwin - Primary    Resident/Fellow/Other Assistant:  Surgeons and Role:     * Willy Ruelas MD - Resident - Assisting    Procedure Summary  Anesthesia: Monitor Anesthesia Care  ASA: II  Anesthesia Staff: Anesthesiologist: Norah Greenwood DO  C-AA: ANTHONY Chakraborty  Estimated Blood Loss: 1 mL  Intra-op Medications: Administrations occurring from 1015 to 1115 on 24:  * No intraprocedure medications in log *           Anesthesia Record               Intraprocedure I/O Totals          Intake    Propofol Drip 0.00 mL    The total shown is the total volume documented since Anesthesia Start was filed.    Total Intake 0 mL          Specimen: No specimens collected     Staff:   Circulator: Crystal Cantu RN; Yokasta Gunter RN  Scrub Person: Dustin Corrales         Drains and/or Catheters: * None in log *    Tourniquet Times:   * Missing tourniquet times found for documented tourniquets in lo *     Implants:     Findings: Chronic dorsal dislocation right middle finger PIP joint    Indications: Everton Geiger is an 58 y.o. male who is having surgery for Closed dislocation finger, proximal interphalangeal joint, traumatic [S63.289A].      The patient was seen in the preoperative area. The risks, benefits, complications, treatment options, non-operative alternatives, expected recovery and outcomes were discussed with the patient. The possibilities of reaction to medication,  pulmonary aspiration, injury to surrounding structures, bleeding, recurrent infection, the need for additional procedures, failure to diagnose a condition, and creating a complication requiring transfusion or operation were discussed with the patient. The patient concurred with the proposed plan, giving informed consent.  The site of surgery was properly noted/marked if necessary per policy. The patient has been actively warmed in preoperative area. Preoperative antibiotics have been ordered and given within 1 hours of incision. Venous thrombosis prophylaxis have been ordered including bilateral sequential compression devices    Procedure Details:   Patient is a 58-year-old left-hand-dominant gentleman who presented to the emergency room roughly 1 week ago with a right middle finger PIP joint dislocation which at that time he reported was at least 1-1/2 weeks old.  He did not seek medical attention when the injury occurred.  An attempt was made to close reduction in the emergency room but this was unsuccessful.  He was splinted and followed up in the office earlier this week and now presents to the operating room for open reduction of his chronic dislocation of the PIP joint.  Preoperatively the right middle finger was identified and marked for surgery.  Informed consent process was completed.  We discussed the complexities of this situation because of its delayed presentation.  It is unlikely that he gains functional range of motion of the middle finger as a result of this injury and further procedures may be necessary including potential PIP joint arthrodesis or amputation if the finger is stiff and painful and interferes with hand function.    Patient was brought to the operating room placed supine on the operating table.  A timeout procedure was performed to verify the correct patient procedure and operative site.  Intravenous antibiotics were administered.  After appropriate level of IV sedation been achieved  local anesthetic was infiltrated circumferentially around the base of the right middle finger.  The right upper extremity was then prepped and draped in usual sterile fashion.  Limb was exsanguinated and a tourniquet was inflated to 250 mmHg.    We made a curvilinear incision on the dorsal aspect of the middle finger centered at the PIP joint with apex radial because of the degree of deformity.  We dissected down to the extensor tendons.  Dorsal venous structures were cauterized and divided.  We then made incision along the volar surface of the radial lateral band extending through the transverse ligament at the PIP joint allowing us to reflect the extensor tendon dorsally and ulnarly.  We identified significant callus formation that had formed on the long and dorsal aspect of the proximal phalanx neck which was essentially supporting the base of the middle phalanx.  The collateral ligaments were released from the origin on the proximal phalanx to expose the radial aspect of the proximal phalanx.  Looking volarly we can see the articular surface of the proximal phalanx.  There did not appear to be any interposition of volar plate between the proximal and middle phalanx.  Scar around the base of the middle phalanx was also released.  However we are still unable to reestablish the length necessary to get the joint reduced so we reproduced this interval on the ulnar aspect of the finger volar to the ulnar lateral band.  The joint capsule was incised here as well and the soft tissues around the ulnar aspect of the proximal phalanx head were also released.  We did release the flexor sheath from the distal aspect of the proximal phalanx which revealed the underlying FDP and FDS tendons.  Now with a more global release around the proximal phalanx head and the middle phalanx base we were able to reestablish the length necessary to reduce the dislocation.  With some volar translation at the PIP joint we are able to now  reduce the dislocation which was confirmed fluoroscopically.  We placed the PIP joint into about 15 degrees of flexion and then stabilized the PIP joint with a 0.045 inch Kapil wire which was inserted from the radial aspect of the proximal phalanx condyles and retrieved dorsally and ulnarly at the middle phalanx.  The wire was then drawn distally so that the tip of the K wire had just penetrated the radial cortex of the proximal phalanx.  Biplanar fluoroscopic images were then obtained to confirm anatomic reduction of the PIP joint.  The wound was copiously irrigated and then closed interrupted fashion.  The K wire was left exposed on the ulnar aspect of the digit and protected with a Radha ball.  The patient was placed into a radial gutter splint and awoken from his anesthetic.  He was transferred to recovery in stable condition.    Postoperatively he will be discharged home once comfortable.  He will remain in his splint until he returns to clinic at roughly 2 weeks but we anticipate K wire removal at roughly 4 weeks after surgery which at that point we will refer him to therapy for range of motion efforts.  I do suspect that he will have a chronically stiff middle finger PIP joint.        Complications:  None; patient tolerated the procedure well.    Disposition: PACU - hemodynamically stable.  Condition: stable         Additional Details:      Attending Attestation: I was present and scrubbed for the entire procedure.    Jace Goodwin  Phone Number: [phone]

## 2024-06-03 ENCOUNTER — OFFICE VISIT (OUTPATIENT)
Dept: ORTHOPEDIC SURGERY | Facility: CLINIC | Age: 59
End: 2024-06-03
Payer: MEDICAID

## 2024-06-03 VITALS — BODY MASS INDEX: 25.03 KG/M2 | HEIGHT: 69 IN | WEIGHT: 169 LBS

## 2024-06-03 DIAGNOSIS — S63.289A DISLOCATION OF PROXIMAL INTERPHALANGEAL JOINT OF FINGER, INITIAL ENCOUNTER: Primary | ICD-10-CM

## 2024-06-03 PROCEDURE — 1036F TOBACCO NON-USER: CPT | Performed by: ORTHOPAEDIC SURGERY

## 2024-06-03 PROCEDURE — 99024 POSTOP FOLLOW-UP VISIT: CPT | Performed by: ORTHOPAEDIC SURGERY

## 2024-06-03 PROCEDURE — L3809 WHFO W/O JOINTS PRE OTS: HCPCS | Performed by: ORTHOPAEDIC SURGERY

## 2024-06-03 ASSESSMENT — PAIN - FUNCTIONAL ASSESSMENT: PAIN_FUNCTIONAL_ASSESSMENT: NO/DENIES PAIN

## 2024-06-03 NOTE — PROGRESS NOTES
First postoperative visit after open reduction and internal fixation of a chronic right middle finger PIP joint dislocation.  Date of surgery May 17.  Pain is well-controlled.    Examination reveals healed surgical incision dorsally across the middle finger.  Pin site on the ulnar aspect of the middle phalanx is clean without signs of infection.  Fingertip is well-perfused.    Impression: Chronic middle finger PIP joint dislocation right hand.    Plan: We will leave the pin in for 2 more weeks.  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  We have provided him a removable radial gutter splint that he can take off while sitting at a desk.  We have discussed pin care and wound care.  He will return in 2 weeks for repeat clinical examination with K wire removal and referral to therapy.    Patient was prescribed a contender radial gutter splint for PIP joint dislocation. The patient has weakness, instability and/or deformity of their right middle finger which requires stabilization from this orthosis to improve their function.      Verbal and written instructions for the use, wear schedule, cleaning and application of this item were given.  Patient was instructed that should the brace result in increased pain, decreased sensation, increased swelling, or an overall worsening of their medical condition, to please contact our office immediately.     Orthotic management and training was provided for skin care, modifications due to healing tissues, edema changes, interruption in skin integrity, and safety precautions with the orthosis.    Jace Goodwin MD    [facility] [employer]  Department of Orthopaedic Surgery  Chief of Hand and Upper Extremity Surgery  [facility]    Dictation performed with the use of voice recognition software. Syntax and grammatical errors may exist.

## 2024-06-18 ENCOUNTER — OFFICE VISIT (OUTPATIENT)
Dept: ORTHOPEDIC SURGERY | Facility: HOSPITAL | Age: 59
End: 2024-06-18

## 2024-06-18 ENCOUNTER — DOCUMENTATION (OUTPATIENT)
Dept: OCCUPATIONAL THERAPY | Facility: HOSPITAL | Age: 59
End: 2024-06-18
Payer: MEDICAID

## 2024-06-18 VITALS — HEIGHT: 69 IN | WEIGHT: 169 LBS | BODY MASS INDEX: 25.03 KG/M2

## 2024-06-18 DIAGNOSIS — S63.289A DISLOCATION OF PROXIMAL INTERPHALANGEAL JOINT OF FINGER, INITIAL ENCOUNTER: Primary | ICD-10-CM

## 2024-06-18 PROCEDURE — 1036F TOBACCO NON-USER: CPT | Performed by: ORTHOPAEDIC SURGERY

## 2024-06-18 PROCEDURE — 99024 POSTOP FOLLOW-UP VISIT: CPT | Performed by: ORTHOPAEDIC SURGERY

## 2024-06-18 ASSESSMENT — PAIN - FUNCTIONAL ASSESSMENT: PAIN_FUNCTIONAL_ASSESSMENT: NO/DENIES PAIN

## 2024-06-18 NOTE — PROGRESS NOTES
[facility]  Hand and Upper Extremity Service  Post Operative visit         Date of surgery: 5/17/24    Surgery(s) performed: ORIF right middle finger        Subjective report: Second postoperative visit. He reports he has no pain. Overall doing well.        Exam findings: Right hand reveals healed surgical incision dorsally across the middle finger. Circumferential swelling of the middle finger PIP joint. K-Wire on ulnar aspect of middle finger is clean and dry.        Radiograph findings: No new images obtained       Impression: Chronic middle finger PIP joint dislocation right hand       Plan: Today the K-wire was removed. He has been referred to therapy for mobilization initiation and we've discussed it's unclear how much motion he'll regain in this joint. He'll return in 1 month for repeat clinical examination with xrays of right middle finger.        Follow Up: 1 month            Jace Goodwin MD    [facility] [employer]  Department of Orthopaedic Surgery  Chief of Hand and Upper Extremity Surgery  [facility]    Scribe Attestation  By signing my name below, ILuis Alberto , Scrgómez   attest that this documentation has been prepared under the direction and in the presence of Dr. Jace Goodwin.      Dictation performed with the use of voice recognition software. Syntax and grammatical errors may exist.

## 2024-06-18 NOTE — PROGRESS NOTES
Occupational Therapy                 Therapy Communication Note    Patient Name: Everton Geiger  MRN: [id number]  Today's Date: 6/18/2024     Discipline: Occupational Therapy    Missed Visit Reason:      Comment: Patient was issued tendon glide, DIP blocking, PIP blocking, digit slides on towel, and coban wrapping at night for edema management.

## 2024-06-26 ENCOUNTER — APPOINTMENT (OUTPATIENT)
Dept: CARDIOLOGY | Facility: HOSPITAL | Age: 59
End: 2024-06-26
Payer: MEDICAID

## 2024-06-26 ENCOUNTER — APPOINTMENT (OUTPATIENT)
Dept: RADIOLOGY | Facility: HOSPITAL | Age: 59
End: 2024-06-26
Payer: MEDICAID

## 2024-06-26 ENCOUNTER — HOSPITAL ENCOUNTER (INPATIENT)
Facility: HOSPITAL | Age: 59
End: 2024-06-26
Attending: EMERGENCY MEDICINE | Admitting: INTERNAL MEDICINE
Payer: MEDICAID

## 2024-06-26 DIAGNOSIS — R41.0 DELIRIUM: ICD-10-CM

## 2024-06-26 DIAGNOSIS — R06.02 SOB (SHORTNESS OF BREATH): ICD-10-CM

## 2024-06-26 DIAGNOSIS — F10.931 ALCOHOL WITHDRAWAL SYNDROME, WITH DELIRIUM (MULTI): Primary | ICD-10-CM

## 2024-06-26 LAB
ALBUMIN SERPL BCP-MCNC: 4.3 G/DL (ref 3.4–5)
ALP SERPL-CCNC: 70 U/L (ref 33–120)
ALT SERPL W P-5'-P-CCNC: 11 U/L (ref 10–52)
AMMONIA PLAS-SCNC: 14 UMOL/L (ref 16–53)
AMPHETAMINES UR QL SCN: NORMAL
ANION GAP BLDV CALCULATED.4IONS-SCNC: 14 MMOL/L (ref 10–25)
ANION GAP SERPL CALC-SCNC: 21 MMOL/L (ref 10–20)
APAP SERPL-MCNC: <10 UG/ML
APPEARANCE UR: CLEAR
AST SERPL W P-5'-P-CCNC: 17 U/L (ref 9–39)
ATRIAL RATE: 136 BPM
BARBITURATES UR QL SCN: NORMAL
BASE EXCESS BLDV CALC-SCNC: -2.1 MMOL/L (ref -2–3)
BASOPHILS # BLD AUTO: 0.05 X10*3/UL (ref 0–0.1)
BASOPHILS NFR BLD AUTO: 0.6 %
BENZODIAZ UR QL SCN: NORMAL
BILIRUB SERPL-MCNC: 0.4 MG/DL (ref 0–1.2)
BILIRUB UR STRIP.AUTO-MCNC: NEGATIVE MG/DL
BODY TEMPERATURE: ABNORMAL
BUN SERPL-MCNC: 8 MG/DL (ref 6–23)
BZE UR QL SCN: NORMAL
CA-I BLDV-SCNC: 1.11 MMOL/L (ref 1.1–1.33)
CALCIUM SERPL-MCNC: 9.3 MG/DL (ref 8.6–10.3)
CANNABINOIDS UR QL SCN: NORMAL
CARDIAC TROPONIN I PNL SERPL HS: 18 NG/L (ref 0–20)
CHLORIDE BLDV-SCNC: 108 MMOL/L (ref 98–107)
CHLORIDE SERPL-SCNC: 104 MMOL/L (ref 98–107)
CK SERPL-CCNC: 57 U/L (ref 0–325)
CO2 SERPL-SCNC: 23 MMOL/L (ref 21–32)
COLOR UR: ABNORMAL
CREAT SERPL-MCNC: 0.73 MG/DL (ref 0.5–1.3)
CRITICAL CALL TIME: 1441
CRITICAL CALLED BY: ABNORMAL
CRITICAL CALLED TO: ABNORMAL
CRITICAL READ BACK: ABNORMAL
EGFRCR SERPLBLD CKD-EPI 2021: >90 ML/MIN/1.73M*2
EOSINOPHIL # BLD AUTO: 0 X10*3/UL (ref 0–0.7)
EOSINOPHIL NFR BLD AUTO: 0 %
ERYTHROCYTE [DISTWIDTH] IN BLOOD BY AUTOMATED COUNT: 13 % (ref 11.5–14.5)
ETHANOL SERPL-MCNC: 203 MG/DL
FENTANYL+NORFENTANYL UR QL SCN: NORMAL
GGT SERPL-CCNC: 30 U/L (ref 5–64)
GLUCOSE BLD MANUAL STRIP-MCNC: 116 MG/DL (ref 74–99)
GLUCOSE BLD MANUAL STRIP-MCNC: 84 MG/DL (ref 74–99)
GLUCOSE BLD MANUAL STRIP-MCNC: 98 MG/DL (ref 74–99)
GLUCOSE BLDV-MCNC: 99 MG/DL (ref 74–99)
GLUCOSE SERPL-MCNC: 93 MG/DL (ref 74–99)
GLUCOSE UR STRIP.AUTO-MCNC: NORMAL MG/DL
HCO3 BLDV-SCNC: 22.4 MMOL/L (ref 22–26)
HCT VFR BLD AUTO: 44.5 % (ref 41–52)
HCT VFR BLD EST: 47 % (ref 41–52)
HGB BLD-MCNC: 15.1 G/DL (ref 13.5–17.5)
HGB BLDV-MCNC: 15.8 G/DL (ref 13.5–17.5)
HOLD SPECIMEN: NORMAL
HOLD SPECIMEN: NORMAL
IMM GRANULOCYTES # BLD AUTO: 0.04 X10*3/UL (ref 0–0.7)
IMM GRANULOCYTES NFR BLD AUTO: 0.5 % (ref 0–0.9)
INHALED O2 CONCENTRATION: 21 %
KETONES UR STRIP.AUTO-MCNC: ABNORMAL MG/DL
LACTATE BLDV-SCNC: 6.9 MMOL/L (ref 0.4–2)
LACTATE SERPL-SCNC: 6.5 MMOL/L (ref 0.4–2)
LACTATE SERPL-SCNC: 6.6 MMOL/L (ref 0.4–2)
LACTATE SERPL-SCNC: 8.5 MMOL/L (ref 0.4–2)
LEUKOCYTE ESTERASE UR QL STRIP.AUTO: NEGATIVE
LYMPHOCYTES # BLD AUTO: 3.16 X10*3/UL (ref 1.2–4.8)
LYMPHOCYTES NFR BLD AUTO: 36 %
MAGNESIUM SERPL-MCNC: 1.78 MG/DL (ref 1.6–2.4)
MCH RBC QN AUTO: 32.3 PG (ref 26–34)
MCHC RBC AUTO-ENTMCNC: 33.9 G/DL (ref 32–36)
MCV RBC AUTO: 95 FL (ref 80–100)
METHADONE UR QL SCN: NORMAL
MONOCYTES # BLD AUTO: 0.49 X10*3/UL (ref 0.1–1)
MONOCYTES NFR BLD AUTO: 5.6 %
NEUTROPHILS # BLD AUTO: 5.03 X10*3/UL (ref 1.2–7.7)
NEUTROPHILS NFR BLD AUTO: 57.3 %
NITRITE UR QL STRIP.AUTO: NEGATIVE
NRBC BLD-RTO: 0 /100 WBCS (ref 0–0)
OPIATES UR QL SCN: NORMAL
OXYCODONE+OXYMORPHONE UR QL SCN: NORMAL
OXYHGB MFR BLDV: 82.5 % (ref 45–75)
PCO2 BLDV: 37 MM HG (ref 41–51)
PCP UR QL SCN: NORMAL
PH BLDV: 7.39 PH (ref 7.33–7.43)
PH UR STRIP.AUTO: 6.5 [PH]
PLATELET # BLD AUTO: 406 X10*3/UL (ref 150–450)
PO2 BLDV: 54 MM HG (ref 35–45)
POTASSIUM BLDV-SCNC: 3.6 MMOL/L (ref 3.5–5.3)
POTASSIUM SERPL-SCNC: 3.9 MMOL/L (ref 3.5–5.3)
PR INTERVAL: 104 MS
PROT SERPL-MCNC: 7.4 G/DL (ref 6.4–8.2)
PROT UR STRIP.AUTO-MCNC: NEGATIVE MG/DL
Q ONSET: 220 MS
QRS COUNT: 22 BEATS
QRS DURATION: 90 MS
QT INTERVAL: 376 MS
QTC CALCULATION(BAZETT): 565 MS
QTC FREDERICIA: 493 MS
R AXIS: 77 DEGREES
RBC # BLD AUTO: 4.67 X10*6/UL (ref 4.5–5.9)
RBC # UR STRIP.AUTO: NEGATIVE /UL
SALICYLATES SERPL-MCNC: <3 MG/DL
SAO2 % BLDV: 84 % (ref 45–75)
SODIUM BLDV-SCNC: 141 MMOL/L (ref 136–145)
SODIUM SERPL-SCNC: 144 MMOL/L (ref 136–145)
SP GR UR STRIP.AUTO: 1.01
T AXIS: 77 DEGREES
T OFFSET: 408 MS
UROBILINOGEN UR STRIP.AUTO-MCNC: NORMAL MG/DL
VENTRICULAR RATE: 136 BPM
WBC # BLD AUTO: 8.8 X10*3/UL (ref 4.4–11.3)

## 2024-06-26 PROCEDURE — 87040 BLOOD CULTURE FOR BACTERIA: CPT | Mod: STJLAB

## 2024-06-26 PROCEDURE — 85025 COMPLETE CBC W/AUTO DIFF WBC: CPT | Performed by: STUDENT IN AN ORGANIZED HEALTH CARE EDUCATION/TRAINING PROGRAM

## 2024-06-26 PROCEDURE — 99285 EMERGENCY DEPT VISIT HI MDM: CPT | Performed by: EMERGENCY MEDICINE

## 2024-06-26 PROCEDURE — 96360 HYDRATION IV INFUSION INIT: CPT

## 2024-06-26 PROCEDURE — 2500000004 HC RX 250 GENERAL PHARMACY W/ HCPCS (ALT 636 FOR OP/ED)

## 2024-06-26 PROCEDURE — 82947 ASSAY GLUCOSE BLOOD QUANT: CPT

## 2024-06-26 PROCEDURE — 36415 COLL VENOUS BLD VENIPUNCTURE: CPT | Performed by: STUDENT IN AN ORGANIZED HEALTH CARE EDUCATION/TRAINING PROGRAM

## 2024-06-26 PROCEDURE — 83605 ASSAY OF LACTIC ACID: CPT

## 2024-06-26 PROCEDURE — 96361 HYDRATE IV INFUSION ADD-ON: CPT

## 2024-06-26 PROCEDURE — 93005 ELECTROCARDIOGRAM TRACING: CPT

## 2024-06-26 PROCEDURE — 72125 CT NECK SPINE W/O DYE: CPT | Performed by: RADIOLOGY

## 2024-06-26 PROCEDURE — 80307 DRUG TEST PRSMV CHEM ANLYZR: CPT

## 2024-06-26 PROCEDURE — 80143 DRUG ASSAY ACETAMINOPHEN: CPT | Performed by: STUDENT IN AN ORGANIZED HEALTH CARE EDUCATION/TRAINING PROGRAM

## 2024-06-26 PROCEDURE — 82550 ASSAY OF CK (CPK): CPT | Performed by: STUDENT IN AN ORGANIZED HEALTH CARE EDUCATION/TRAINING PROGRAM

## 2024-06-26 PROCEDURE — 84132 ASSAY OF SERUM POTASSIUM: CPT | Performed by: STUDENT IN AN ORGANIZED HEALTH CARE EDUCATION/TRAINING PROGRAM

## 2024-06-26 PROCEDURE — 71045 X-RAY EXAM CHEST 1 VIEW: CPT

## 2024-06-26 PROCEDURE — 70450 CT HEAD/BRAIN W/O DYE: CPT

## 2024-06-26 PROCEDURE — 83735 ASSAY OF MAGNESIUM: CPT | Performed by: STUDENT IN AN ORGANIZED HEALTH CARE EDUCATION/TRAINING PROGRAM

## 2024-06-26 PROCEDURE — 87040 BLOOD CULTURE FOR BACTERIA: CPT | Mod: STJLAB | Performed by: STUDENT IN AN ORGANIZED HEALTH CARE EDUCATION/TRAINING PROGRAM

## 2024-06-26 PROCEDURE — 2500000001 HC RX 250 WO HCPCS SELF ADMINISTERED DRUGS (ALT 637 FOR MEDICARE OP): Performed by: STUDENT IN AN ORGANIZED HEALTH CARE EDUCATION/TRAINING PROGRAM

## 2024-06-26 PROCEDURE — 72125 CT NECK SPINE W/O DYE: CPT

## 2024-06-26 PROCEDURE — 84145 PROCALCITONIN (PCT): CPT | Mod: STJLAB

## 2024-06-26 PROCEDURE — 2500000004 HC RX 250 GENERAL PHARMACY W/ HCPCS (ALT 636 FOR OP/ED): Performed by: STUDENT IN AN ORGANIZED HEALTH CARE EDUCATION/TRAINING PROGRAM

## 2024-06-26 PROCEDURE — 99285 EMERGENCY DEPT VISIT HI MDM: CPT | Mod: 25

## 2024-06-26 PROCEDURE — 2500000005 HC RX 250 GENERAL PHARMACY W/O HCPCS

## 2024-06-26 PROCEDURE — 83605 ASSAY OF LACTIC ACID: CPT | Performed by: STUDENT IN AN ORGANIZED HEALTH CARE EDUCATION/TRAINING PROGRAM

## 2024-06-26 PROCEDURE — 99291 CRITICAL CARE FIRST HOUR: CPT

## 2024-06-26 PROCEDURE — 96374 THER/PROPH/DIAG INJ IV PUSH: CPT | Mod: 59

## 2024-06-26 PROCEDURE — 82977 ASSAY OF GGT: CPT

## 2024-06-26 PROCEDURE — 87086 URINE CULTURE/COLONY COUNT: CPT | Mod: STJLAB

## 2024-06-26 PROCEDURE — 70450 CT HEAD/BRAIN W/O DYE: CPT | Performed by: RADIOLOGY

## 2024-06-26 PROCEDURE — 71045 X-RAY EXAM CHEST 1 VIEW: CPT | Mod: FOREIGN READ | Performed by: RADIOLOGY

## 2024-06-26 PROCEDURE — 84484 ASSAY OF TROPONIN QUANT: CPT | Performed by: STUDENT IN AN ORGANIZED HEALTH CARE EDUCATION/TRAINING PROGRAM

## 2024-06-26 PROCEDURE — 81003 URINALYSIS AUTO W/O SCOPE: CPT | Performed by: STUDENT IN AN ORGANIZED HEALTH CARE EDUCATION/TRAINING PROGRAM

## 2024-06-26 PROCEDURE — 83930 ASSAY OF BLOOD OSMOLALITY: CPT | Mod: STJLAB | Performed by: STUDENT IN AN ORGANIZED HEALTH CARE EDUCATION/TRAINING PROGRAM

## 2024-06-26 PROCEDURE — 82140 ASSAY OF AMMONIA: CPT | Performed by: STUDENT IN AN ORGANIZED HEALTH CARE EDUCATION/TRAINING PROGRAM

## 2024-06-26 PROCEDURE — 87449 NOS EACH ORGANISM AG IA: CPT | Mod: STJLAB

## 2024-06-26 PROCEDURE — 87899 AGENT NOS ASSAY W/OPTIC: CPT | Mod: STJLAB

## 2024-06-26 PROCEDURE — 1100000001 HC PRIVATE ROOM DAILY

## 2024-06-26 RX ORDER — DIAZEPAM 5 MG/ML
5 INJECTION, SOLUTION INTRAMUSCULAR; INTRAVENOUS EVERY 8 HOURS
Status: DISCONTINUED | OUTPATIENT
Start: 2024-06-26 | End: 2024-06-26

## 2024-06-26 RX ORDER — THIAMINE HYDROCHLORIDE 100 MG/ML
100 INJECTION, SOLUTION INTRAMUSCULAR; INTRAVENOUS DAILY
Status: DISCONTINUED | OUTPATIENT
Start: 2024-06-26 | End: 2024-06-27

## 2024-06-26 RX ORDER — LORAZEPAM 2 MG/ML
2 INJECTION INTRAMUSCULAR EVERY 2 HOUR PRN
Status: DISCONTINUED | OUTPATIENT
Start: 2024-06-26 | End: 2024-06-27

## 2024-06-26 RX ORDER — CEFTRIAXONE 2 G/50ML
2 INJECTION, SOLUTION INTRAVENOUS EVERY 24 HOURS
Status: DISCONTINUED | OUTPATIENT
Start: 2024-06-26 | End: 2024-06-28

## 2024-06-26 RX ORDER — FOLIC ACID 1 MG/1
1 TABLET ORAL DAILY
Status: DISCONTINUED | OUTPATIENT
Start: 2024-06-26 | End: 2024-06-27

## 2024-06-26 RX ORDER — MULTIVIT-MIN/IRON FUM/FOLIC AC 7.5 MG-4
1 TABLET ORAL DAILY
Status: ON HOLD | COMMUNITY

## 2024-06-26 RX ORDER — MAGNESIUM SULFATE HEPTAHYDRATE 40 MG/ML
4 INJECTION, SOLUTION INTRAVENOUS EVERY 6 HOURS PRN
Status: DISCONTINUED | OUTPATIENT
Start: 2024-06-26 | End: 2024-06-28

## 2024-06-26 RX ORDER — LORAZEPAM 2 MG/ML
2 INJECTION INTRAMUSCULAR EVERY 2 HOUR PRN
Status: DISCONTINUED | OUTPATIENT
Start: 2024-06-26 | End: 2024-06-26

## 2024-06-26 RX ORDER — LANOLIN ALCOHOL/MO/W.PET/CERES
100 CREAM (GRAM) TOPICAL DAILY
Status: DISCONTINUED | OUTPATIENT
Start: 2024-06-29 | End: 2024-06-27

## 2024-06-26 RX ORDER — DIAZEPAM 5 MG/ML
5 INJECTION, SOLUTION INTRAMUSCULAR; INTRAVENOUS EVERY 2 HOUR PRN
Status: DISCONTINUED | OUTPATIENT
Start: 2024-06-26 | End: 2024-06-26

## 2024-06-26 RX ORDER — FOLIC ACID 1 MG/1
1 TABLET ORAL DAILY
Status: DISCONTINUED | OUTPATIENT
Start: 2024-06-26 | End: 2024-06-26

## 2024-06-26 RX ORDER — LORAZEPAM 0.5 MG/1
0.5 TABLET ORAL EVERY 2 HOUR PRN
Status: DISCONTINUED | OUTPATIENT
Start: 2024-06-26 | End: 2024-06-26

## 2024-06-26 RX ORDER — QUETIAPINE FUMARATE 25 MG/1
25 TABLET, FILM COATED ORAL NIGHTLY
Status: DISPENSED | OUTPATIENT
Start: 2024-06-26

## 2024-06-26 RX ORDER — DIAZEPAM 5 MG/ML
10 INJECTION, SOLUTION INTRAMUSCULAR; INTRAVENOUS EVERY 8 HOURS
Status: DISCONTINUED | OUTPATIENT
Start: 2024-06-27 | End: 2024-06-28

## 2024-06-26 RX ORDER — LORAZEPAM 1 MG/1
1 TABLET ORAL EVERY 2 HOUR PRN
Status: DISCONTINUED | OUTPATIENT
Start: 2024-06-26 | End: 2024-06-26

## 2024-06-26 RX ORDER — LORAZEPAM 2 MG/ML
4 INJECTION INTRAMUSCULAR EVERY 2 HOUR PRN
Status: DISCONTINUED | OUTPATIENT
Start: 2024-06-26 | End: 2024-06-27

## 2024-06-26 RX ORDER — LORAZEPAM 2 MG/ML
0.5 INJECTION INTRAMUSCULAR EVERY 2 HOUR PRN
Status: DISCONTINUED | OUTPATIENT
Start: 2024-06-26 | End: 2024-06-26

## 2024-06-26 RX ORDER — LANOLIN ALCOHOL/MO/W.PET/CERES
100 CREAM (GRAM) TOPICAL DAILY
Status: DISCONTINUED | OUTPATIENT
Start: 2024-06-29 | End: 2024-06-26

## 2024-06-26 RX ORDER — LORAZEPAM 2 MG/ML
1 INJECTION INTRAMUSCULAR EVERY 2 HOUR PRN
Status: DISCONTINUED | OUTPATIENT
Start: 2024-06-26 | End: 2024-06-26

## 2024-06-26 RX ORDER — POTASSIUM CHLORIDE 20 MEQ/1
20 TABLET, EXTENDED RELEASE ORAL EVERY 6 HOURS PRN
Status: DISCONTINUED | OUTPATIENT
Start: 2024-06-26 | End: 2024-06-28

## 2024-06-26 RX ORDER — DIAZEPAM 5 MG/ML
5 INJECTION, SOLUTION INTRAMUSCULAR; INTRAVENOUS ONCE
Status: COMPLETED | OUTPATIENT
Start: 2024-06-26 | End: 2024-06-26

## 2024-06-26 RX ORDER — LORAZEPAM 1 MG/1
2 TABLET ORAL EVERY 2 HOUR PRN
Status: DISCONTINUED | OUTPATIENT
Start: 2024-06-26 | End: 2024-06-26

## 2024-06-26 RX ORDER — POTASSIUM CHLORIDE 1.5 G/1.58G
20 POWDER, FOR SOLUTION ORAL EVERY 6 HOURS PRN
Status: DISCONTINUED | OUTPATIENT
Start: 2024-06-26 | End: 2024-06-28

## 2024-06-26 RX ORDER — LABETALOL HYDROCHLORIDE 5 MG/ML
10 INJECTION, SOLUTION INTRAVENOUS EVERY 4 HOURS PRN
Status: DISCONTINUED | OUTPATIENT
Start: 2024-06-26 | End: 2024-06-27

## 2024-06-26 RX ORDER — LABETALOL HYDROCHLORIDE 5 MG/ML
10 INJECTION, SOLUTION INTRAVENOUS ONCE
Status: COMPLETED | OUTPATIENT
Start: 2024-06-26 | End: 2024-06-26

## 2024-06-26 RX ORDER — THIAMINE HYDROCHLORIDE 100 MG/ML
100 INJECTION, SOLUTION INTRAMUSCULAR; INTRAVENOUS DAILY
Status: DISCONTINUED | OUTPATIENT
Start: 2024-06-26 | End: 2024-06-26

## 2024-06-26 RX ORDER — POTASSIUM CHLORIDE 14.9 MG/ML
20 INJECTION INTRAVENOUS EVERY 6 HOURS PRN
Status: DISCONTINUED | OUTPATIENT
Start: 2024-06-26 | End: 2024-06-28

## 2024-06-26 RX ORDER — SERTRALINE HYDROCHLORIDE 50 MG/1
50 TABLET, FILM COATED ORAL NIGHTLY
Status: DISPENSED | OUTPATIENT
Start: 2024-06-26

## 2024-06-26 RX ORDER — MULTIVIT-MIN/IRON FUM/FOLIC AC 7.5 MG-4
1 TABLET ORAL DAILY
Status: DISCONTINUED | OUTPATIENT
Start: 2024-06-26 | End: 2024-06-26

## 2024-06-26 RX ORDER — VANCOMYCIN 2 GRAM/500 ML IN 0.9 % SODIUM CHLORIDE INTRAVENOUS
2 ONCE
Status: DISCONTINUED | OUTPATIENT
Start: 2024-06-26 | End: 2024-06-26

## 2024-06-26 RX ORDER — SODIUM CHLORIDE, SODIUM LACTATE, POTASSIUM CHLORIDE, CALCIUM CHLORIDE 600; 310; 30; 20 MG/100ML; MG/100ML; MG/100ML; MG/100ML
200 INJECTION, SOLUTION INTRAVENOUS CONTINUOUS
Status: DISCONTINUED | OUTPATIENT
Start: 2024-06-26 | End: 2024-06-27

## 2024-06-26 RX ORDER — MAGNESIUM SULFATE HEPTAHYDRATE 40 MG/ML
2 INJECTION, SOLUTION INTRAVENOUS EVERY 6 HOURS PRN
Status: DISCONTINUED | OUTPATIENT
Start: 2024-06-26 | End: 2024-06-28

## 2024-06-26 RX ORDER — LORAZEPAM 2 MG/ML
1 INJECTION INTRAMUSCULAR EVERY 2 HOUR PRN
Status: DISCONTINUED | OUTPATIENT
Start: 2024-06-26 | End: 2024-06-27

## 2024-06-26 RX ORDER — MULTIVIT-MIN/IRON FUM/FOLIC AC 7.5 MG-4
1 TABLET ORAL DAILY
Status: DISCONTINUED | OUTPATIENT
Start: 2024-06-26 | End: 2024-06-27

## 2024-06-26 SDOH — SOCIAL STABILITY: SOCIAL INSECURITY: WITHIN THE LAST YEAR, HAVE YOU BEEN AFRAID OF YOUR PARTNER OR EX-PARTNER?: PATIENT DECLINED

## 2024-06-26 SDOH — SOCIAL STABILITY: SOCIAL NETWORK: HOW OFTEN DO YOU GET TOGETHER WITH FRIENDS OR RELATIVES?: PATIENT DECLINED

## 2024-06-26 SDOH — SOCIAL STABILITY: SOCIAL INSECURITY: DO YOU FEEL UNSAFE GOING BACK TO THE PLACE WHERE YOU ARE LIVING?: NO

## 2024-06-26 SDOH — HEALTH STABILITY: MENTAL HEALTH
STRESS IS WHEN SOMEONE FEELS TENSE, NERVOUS, ANXIOUS, OR CAN'T SLEEP AT NIGHT BECAUSE THEIR MIND IS TROUBLED. HOW STRESSED ARE YOU?: PATIENT DECLINED

## 2024-06-26 SDOH — SOCIAL STABILITY: SOCIAL INSECURITY: ARE THERE ANY APPARENT SIGNS OF INJURIES/BEHAVIORS THAT COULD BE RELATED TO ABUSE/NEGLECT?: NO

## 2024-06-26 SDOH — SOCIAL STABILITY: SOCIAL INSECURITY: ABUSE: ADULT

## 2024-06-26 SDOH — SOCIAL STABILITY: SOCIAL NETWORK: IN A TYPICAL WEEK, HOW MANY TIMES DO YOU TALK ON THE PHONE WITH FAMILY, FRIENDS, OR NEIGHBORS?: PATIENT DECLINED

## 2024-06-26 SDOH — SOCIAL STABILITY: SOCIAL INSECURITY
WITHIN THE LAST YEAR, HAVE TO BEEN RAPED OR FORCED TO HAVE ANY KIND OF SEXUAL ACTIVITY BY YOUR PARTNER OR EX-PARTNER?: PATIENT DECLINED

## 2024-06-26 SDOH — SOCIAL STABILITY: SOCIAL NETWORK: ARE YOU MARRIED, WIDOWED, DIVORCED, SEPARATED, NEVER MARRIED, OR LIVING WITH A PARTNER?: PATIENT DECLINED

## 2024-06-26 SDOH — SOCIAL STABILITY: SOCIAL INSECURITY: HAVE YOU HAD THOUGHTS OF HARMING ANYONE ELSE?: NO

## 2024-06-26 SDOH — SOCIAL STABILITY: SOCIAL INSECURITY
WITHIN THE LAST YEAR, HAVE YOU BEEN KICKED, HIT, SLAPPED, OR OTHERWISE PHYSICALLY HURT BY YOUR PARTNER OR EX-PARTNER?: PATIENT DECLINED

## 2024-06-26 SDOH — SOCIAL STABILITY: SOCIAL INSECURITY: HAVE YOU HAD ANY THOUGHTS OF HARMING ANYONE ELSE?: NO

## 2024-06-26 SDOH — SOCIAL STABILITY: SOCIAL NETWORK: HOW OFTEN DO YOU ATTEND CHURCH OR RELIGIOUS SERVICES?: PATIENT DECLINED

## 2024-06-26 SDOH — SOCIAL STABILITY: SOCIAL INSECURITY: HAS ANYONE EVER THREATENED TO HURT YOUR FAMILY OR YOUR PETS?: NO

## 2024-06-26 SDOH — SOCIAL STABILITY: SOCIAL NETWORK: HOW OFTEN DO YOU ATTENT MEETINGS OF THE CLUB OR ORGANIZATION YOU BELONG TO?: PATIENT DECLINED

## 2024-06-26 SDOH — SOCIAL STABILITY: SOCIAL INSECURITY
WITHIN THE LAST YEAR, HAVE YOU BEEN HUMILIATED OR EMOTIONALLY ABUSED IN OTHER WAYS BY YOUR PARTNER OR EX-PARTNER?: PATIENT DECLINED

## 2024-06-26 SDOH — ECONOMIC STABILITY: INCOME INSECURITY
IN THE PAST 12 MONTHS, HAS THE ELECTRIC, GAS, OIL, OR WATER COMPANY THREATENED TO SHUT OFF SERVICE IN YOUR HOME?: PATIENT DECLINED

## 2024-06-26 SDOH — SOCIAL STABILITY: SOCIAL NETWORK
DO YOU BELONG TO ANY CLUBS OR ORGANIZATIONS SUCH AS CHURCH GROUPS UNIONS, FRATERNAL OR ATHLETIC GROUPS, OR SCHOOL GROUPS?: PATIENT DECLINED

## 2024-06-26 SDOH — ECONOMIC STABILITY: FOOD INSECURITY: WITHIN THE PAST 12 MONTHS, YOU WORRIED THAT YOUR FOOD WOULD RUN OUT BEFORE YOU GOT MONEY TO BUY MORE.: PATIENT DECLINED

## 2024-06-26 SDOH — ECONOMIC STABILITY: FOOD INSECURITY: WITHIN THE PAST 12 MONTHS, THE FOOD YOU BOUGHT JUST DIDN'T LAST AND YOU DIDN'T HAVE MONEY TO GET MORE.: PATIENT DECLINED

## 2024-06-26 SDOH — SOCIAL STABILITY: SOCIAL INSECURITY: DOES ANYONE TRY TO KEEP YOU FROM HAVING/CONTACTING OTHER FRIENDS OR DOING THINGS OUTSIDE YOUR HOME?: NO

## 2024-06-26 ASSESSMENT — COGNITIVE AND FUNCTIONAL STATUS - GENERAL
TOILETING: A LITTLE
MOVING FROM LYING ON BACK TO SITTING ON SIDE OF FLAT BED WITH BEDRAILS: A LITTLE
PERSONAL GROOMING: A LITTLE
DRESSING REGULAR UPPER BODY CLOTHING: A LITTLE
DAILY ACTIVITIY SCORE: 18
MOVING FROM LYING ON BACK TO SITTING ON SIDE OF FLAT BED WITH BEDRAILS: A LITTLE
TOILETING: A LITTLE
DRESSING REGULAR LOWER BODY CLOTHING: A LITTLE
DRESSING REGULAR UPPER BODY CLOTHING: A LITTLE
DRESSING REGULAR LOWER BODY CLOTHING: A LITTLE
STANDING UP FROM CHAIR USING ARMS: A LITTLE
TURNING FROM BACK TO SIDE WHILE IN FLAT BAD: A LITTLE
MOVING TO AND FROM BED TO CHAIR: A LITTLE
HELP NEEDED FOR BATHING: A LITTLE
EATING MEALS: A LITTLE
TURNING FROM BACK TO SIDE WHILE IN FLAT BAD: A LITTLE
PERSONAL GROOMING: A LITTLE
CLIMB 3 TO 5 STEPS WITH RAILING: A LITTLE
EATING MEALS: A LITTLE
PATIENT BASELINE BEDBOUND: NO
MOBILITY SCORE: 18
WALKING IN HOSPITAL ROOM: A LITTLE
CLIMB 3 TO 5 STEPS WITH RAILING: A LITTLE
MOBILITY SCORE: 18
HELP NEEDED FOR BATHING: A LITTLE
DAILY ACTIVITIY SCORE: 18
WALKING IN HOSPITAL ROOM: A LITTLE
MOVING TO AND FROM BED TO CHAIR: A LITTLE
STANDING UP FROM CHAIR USING ARMS: A LITTLE

## 2024-06-26 ASSESSMENT — LIFESTYLE VARIABLES
TOTAL SCORE: 21
VISUAL DISTURBANCES: NOT PRESENT
TACTILE DISTURBANCES: MODERATE ITCHING, PINS AND NEEDLES, BURNING OR NUMBNESS
PAROXYSMAL SWEATS: BARELY PERCEPTIBLE SWEATING, PALMS MOIST
EVER FELT BAD OR GUILTY ABOUT YOUR DRINKING: NO
ORIENTATION AND CLOUDING OF SENSORIUM: ORIENTED AND CAN DO SERIAL ADDITIONS
TREMOR: MODERATE, WITH PATIENT'S ARMS EXTENDED
AUDITORY DISTURBANCES: NOT PRESENT
AGITATION: MODERATELY FIDGETY AND RESTLESS
AUDIT-C TOTAL SCORE: 12
VISUAL DISTURBANCES: NOT PRESENT
ANXIETY: NO ANXIETY, AT EASE
ORIENTATION AND CLOUDING OF SENSORIUM: DISORIENTED FOR DATE BY MORE THAN 2 CALENDAR DAYS
BLOOD PRESSURE: 183/105
TREMOR: 5
ANXIETY: MODERATELY ANXIOUS, OR GUARDED, SO ANXIETY IS INFERRED
ANXIETY: NO ANXIETY, AT EASE
AGITATION: NORMAL ACTIVITY
TREMOR: 5
HEADACHE, FULLNESS IN HEAD: NOT PRESENT
PAROXYSMAL SWEATS: BARELY PERCEPTIBLE SWEATING, PALMS MOIST
HEADACHE, FULLNESS IN HEAD: NOT PRESENT
PAROXYSMAL SWEATS: BARELY PERCEPTIBLE SWEATING, PALMS MOIST
NAUSEA AND VOMITING: NO NAUSEA AND NO VOMITING
NAUSEA AND VOMITING: NO NAUSEA AND NO VOMITING
HAVE YOU EVER FELT YOU SHOULD CUT DOWN ON YOUR DRINKING: YES
AUDITORY DISTURBANCES: NOT PRESENT
SUBSTANCE_ABUSE_PAST_12_MONTHS: NO
ANXIETY: MODERATELY ANXIOUS, OR GUARDED, SO ANXIETY IS INFERRED
TACTILE DISTURBANCES: MODERATE ITCHING, PINS AND NEEDLES, BURNING OR NUMBNESS
HOW OFTEN DO YOU HAVE 6 OR MORE DRINKS ON ONE OCCASION: DAILY OR ALMOST DAILY
PULSE: 103
HEADACHE, FULLNESS IN HEAD: NOT PRESENT
HEADACHE, FULLNESS IN HEAD: NOT PRESENT
PAROXYSMAL SWEATS: 2
AGITATION: MODERATELY FIDGETY AND RESTLESS
AUDITORY DISTURBANCES: NOT PRESENT
HOW MANY STANDARD DRINKS CONTAINING ALCOHOL DO YOU HAVE ON A TYPICAL DAY: 10 OR MORE
VISUAL DISTURBANCES: NOT PRESENT
HAVE PEOPLE ANNOYED YOU BY CRITICIZING YOUR DRINKING: NO
PULSE: 102
TACTILE DISTURBANCES: MODERATE ITCHING, PINS AND NEEDLES, BURNING OR NUMBNESS
SKIP TO QUESTIONS 9-10: 0
AUDIT-C TOTAL SCORE: 12
TOTAL SCORE: 21
ORIENTATION AND CLOUDING OF SENSORIUM: ORIENTED AND CAN DO SERIAL ADDITIONS
AGITATION: MODERATELY FIDGETY AND RESTLESS
AGITATION: NORMAL ACTIVITY
TREMOR: 5
TOTAL SCORE: 1
PAROXYSMAL SWEATS: 2
AUDITORY DISTURBANCES: NOT PRESENT
ORIENTATION AND CLOUDING OF SENSORIUM: DISORIENTED FOR DATE BY MORE THAN 2 CALENDAR DAYS
VISUAL DISTURBANCES: NOT PRESENT
HEADACHE, FULLNESS IN HEAD: NOT PRESENT
NAUSEA AND VOMITING: NO NAUSEA AND NO VOMITING
HOW OFTEN DO YOU HAVE A DRINK CONTAINING ALCOHOL: 4 OR MORE TIMES A WEEK
ORIENTATION AND CLOUDING OF SENSORIUM: DISORIENTED FOR DATE BY MORE THAN 2 CALENDAR DAYS
NAUSEA AND VOMITING: NO NAUSEA AND NO VOMITING
VISUAL DISTURBANCES: NOT PRESENT
TOTAL SCORE: 5
ANXIETY: 3
TOTAL SCORE: 21
AUDITORY DISTURBANCES: NOT PRESENT
TREMOR: SEVERE, EVEN WITH ARMS NOT EXTENDED
TOTAL SCORE: 6
EVER HAD A DRINK FIRST THING IN THE MORNING TO STEADY YOUR NERVES TO GET RID OF A HANGOVER: NO
NAUSEA AND VOMITING: NO NAUSEA AND NO VOMITING
PRESCIPTION_ABUSE_PAST_12_MONTHS: NO

## 2024-06-26 ASSESSMENT — PAIN SCALES - GENERAL
PAINLEVEL_OUTOF10: 0 - NO PAIN

## 2024-06-26 ASSESSMENT — ACTIVITIES OF DAILY LIVING (ADL)
DRESSING YOURSELF: INDEPENDENT
PATIENT'S MEMORY ADEQUATE TO SAFELY COMPLETE DAILY ACTIVITIES?: YES
FEEDING YOURSELF: INDEPENDENT
WALKS IN HOME: INDEPENDENT
JUDGMENT_ADEQUATE_SAFELY_COMPLETE_DAILY_ACTIVITIES: YES
GROOMING: INDEPENDENT
HEARING - RIGHT EAR: FUNCTIONAL
TOILETING: INDEPENDENT
LACK_OF_TRANSPORTATION: PATIENT DECLINED
ADEQUATE_TO_COMPLETE_ADL: YES
BATHING: INDEPENDENT
HEARING - LEFT EAR: FUNCTIONAL

## 2024-06-26 ASSESSMENT — PAIN - FUNCTIONAL ASSESSMENT
PAIN_FUNCTIONAL_ASSESSMENT: 0-10

## 2024-06-26 ASSESSMENT — PATIENT HEALTH QUESTIONNAIRE - PHQ9
SUM OF ALL RESPONSES TO PHQ9 QUESTIONS 1 & 2: 2
2. FEELING DOWN, DEPRESSED OR HOPELESS: SEVERAL DAYS
1. LITTLE INTEREST OR PLEASURE IN DOING THINGS: SEVERAL DAYS

## 2024-06-26 NOTE — PROGRESS NOTES
PHARMACY STROKE RESPONSE      Patient Name: Everton Geiger  MRN: [id number]  Location: [place] [address]    Patient Weight (kg):   Wt Readings from Last 1 Encounters:   06/18/24 76.7 kg (169 lb)        An acute Brain Attack has been activated, pharmacy participated in multidisciplinary team bedside response for Everton Geiger.  Contraindications for fibrinolytic therapy have been reviewed by pharmacy and any issues relating to medication therapy have been discussed directly with the provider(s) caring for this patient.     Pharmacy aided in the bedside response for fibrinolytic therapy. Patient did not receive fibrinolysis.    Orders Placed This Encounter      lactated Ringer's bolus 1,000 mL      Thank you for allowing me to take part in the care of this patient.     Liz Black, ErickaD, [facility]  6/26/2024 2:58 PM         References:    [facility] [place] Stroke Tools   [facility] [place] IV Thrombolysis Checklist

## 2024-06-26 NOTE — CARE PLAN
Problem: Fall/Injury  Goal: Not fall by end of shift  Outcome: Progressing  Goal: Be free from injury by end of the shift  Outcome: Progressing  Goal: Verbalize understanding of personal risk factors for fall in the hospital  Outcome: Progressing  Goal: Verbalize understanding of risk factor reduction measures to prevent injury from fall in the home  Outcome: Progressing  Goal: Use assistive devices by end of the shift  Outcome: Progressing  Goal: Pace activities to prevent fatigue by end of the shift  Outcome: Progressing     Problem: Anxiety  Goal: Attempts to manage anxiety with help  Outcome: Progressing  Goal: Verbalizes ways to manage anxiety  Outcome: Progressing  Goal: Implements measures to reduce anxiety  Outcome: Progressing  Goal: Free from restraint events  Outcome: Progressing     Problem: Anxiety  Goal: Will report anxiety at manageable levels  Outcome: Progressing     Problem: Coping  Goal: Pt/Family able to verbalize concerns and demonstrate effective coping strategies  Outcome: Progressing     Problem: Decision Making  Goal: Pt/Family able to effectively weigh alternatives and participate in decision making related to treatment and care  Outcome: Progressing     Problem: Behavior  Goal: Pt/Family maintain appropriate behavior and adhere to behavioral management agreement, if implemented  Outcome: Progressing     Problem: Drug Abuse/Detox  Goal: Will have no detox symptoms and will verbalize plan for changing drug-related behavior  Outcome: Progressing   The patient's goals for the shift include

## 2024-06-26 NOTE — ED PROVIDER NOTES
"HPI   No chief complaint on file.      HPI  Patient is a 58-year-old male with reported history of alcohol abuse brought in by EMS due to concern for altered mental status.  Per EMS, patient's brother reported that patient had locked himself in his room over the past few days.  On welfare check, patient was found laying on the bed with bottles of benzos as well as alcohol around him.  There is also Benadryl pills as well.  On arrival, patient was altered mumbling words.  He was otherwise moving all extremities and opening eyes spontaneously.  GCS is 12.  History limited from patient given his altered mental status.                  No data recorded                   Patient History   Past Medical History:   Diagnosis Date    Anxiety     Delayed emergence from general anesthesia     Depression     Other specified health status     Known health problems: none     Past Surgical History:   Procedure Laterality Date    SEPTOPLASTY       No family history on file.  Social History     Tobacco Use    Smoking status: Never    Smokeless tobacco: Never    Tobacco comments:     Pt denies any illness in past 30 days     Denies SOB with stairs or activity     Denies any family reaction or problems with anesthesia. Pt had one prior surgery and states \"Very hard to wake up. They were surprised at how long it took\".     Ambulates freely   Vaping Use    Vaping status: Never Used   Substance Use Topics    Alcohol use: Not Currently     Comment: Detox from alcohol x 1 week several years ago    Drug use: Never       Physical Exam   ED Triage Vitals   Temp Pulse Resp BP   -- -- -- --      SpO2 Temp src Heart Rate Source Patient Position   -- -- -- --      BP Location FiO2 (%)     -- --       Physical Exam  Vitals and nursing note reviewed.   Constitutional:       Appearance: He is ill-appearing.   HENT:      Head: Normocephalic.      Right Ear: External ear normal.      Left Ear: External ear normal.      Nose: Nose normal.      " Mouth/Throat:      Mouth: Mucous membranes are dry.   Eyes:      Extraocular Movements: Extraocular movements intact.      Pupils: Pupils are equal, round, and reactive to light.   Cardiovascular:      Rate and Rhythm: Tachycardia present.   Pulmonary:      Effort: Pulmonary effort is normal.      Breath sounds: Normal breath sounds.   Abdominal:      General: Abdomen is flat.      Tenderness: There is no abdominal tenderness.   Musculoskeletal:         General: Normal range of motion.   Skin:     General: Skin is warm and dry.      Capillary Refill: Capillary refill takes less than 2 seconds.   Neurological:      Mental Status: He is alert.      Comments:  Mumbling words, confused. GCS 13         ED Course & MDM   ED Course as of 06/26/24 1600   Wed Jun 26, 2024   1500 CT brain attack head wo IV contrast  IMPRESSION:  Unremarkable exam, without acute findings.   [MJ]   1500 CMP with elevated anion gap of 21.  Otherwise unremarkable.  Patient's ammonia within normal limits.  Alcohol level mildly elevated 203.  Patient's lactate was elevated VBG at 6.9. [MJ]   1501 Patient elevated lactate as well as altered mentation alcohol level, symptoms are concerning for potential withdrawal seizures with patient being postictal state.  He is communicating better with nursing reassessment.  Patient was placed on CIWA protocol. [MJ]   1525 CIWA 21 here. Given this, plan to admit to ICU for monitoring given DT risk.  [MJ]   1527 CT cervical spine wo IV contrast  IMPRESSION:  Mild spondylosis, otherwise no acute findings.   [MJ]   1559 Given concern for ETOH withdrawal seizure with delirium, plan to admit patient to ICU for close monitoring.  [MJ]   1559 Dr. Bundy, the intensivist on call accepted patient for admission, appreciate her help.  [MJ]      ED Course User Index  [MJ] Lucas Chaparro, DO         Diagnoses as of 06/26/24 1600   Alcohol withdrawal syndrome, with delirium (Multi)   Delirium       Medical Decision  Making      Procedure  Procedures     Lucas Chaparro, DO  Resident  06/26/24 1600

## 2024-06-27 ENCOUNTER — APPOINTMENT (OUTPATIENT)
Dept: RADIOLOGY | Facility: HOSPITAL | Age: 59
End: 2024-06-27
Payer: MEDICAID

## 2024-06-27 ENCOUNTER — APPOINTMENT (OUTPATIENT)
Dept: CARDIOLOGY | Facility: HOSPITAL | Age: 59
End: 2024-06-27
Payer: MEDICAID

## 2024-06-27 PROBLEM — R06.02 SOB (SHORTNESS OF BREATH): Status: ACTIVE | Noted: 2024-06-27

## 2024-06-27 LAB
ALBUMIN SERPL BCP-MCNC: 3.4 G/DL (ref 3.4–5)
ALBUMIN SERPL BCP-MCNC: 3.5 G/DL (ref 3.4–5)
ALP SERPL-CCNC: 55 U/L (ref 33–120)
ALT SERPL W P-5'-P-CCNC: 9 U/L (ref 10–52)
ANION GAP BLDA CALCULATED.4IONS-SCNC: 10 MMO/L (ref 10–25)
ANION GAP SERPL CALC-SCNC: 13 MMOL/L (ref 10–20)
ANION GAP SERPL CALC-SCNC: 15 MMOL/L (ref 10–20)
APPARATUS: ABNORMAL
AST SERPL W P-5'-P-CCNC: 16 U/L (ref 9–39)
BASE EXCESS BLDA CALC-SCNC: 3.7 MMOL/L (ref -2–3)
BASOPHILS # BLD AUTO: 0.04 X10*3/UL (ref 0–0.1)
BASOPHILS NFR BLD AUTO: 0.4 %
BILIRUB SERPL-MCNC: 0.9 MG/DL (ref 0–1.2)
BNP SERPL-MCNC: 678 PG/ML (ref 0–99)
BODY TEMPERATURE: ABNORMAL
BUN SERPL-MCNC: 5 MG/DL (ref 6–23)
BUN SERPL-MCNC: 6 MG/DL (ref 6–23)
CA-I BLDA-SCNC: 1.15 MMOL/L (ref 1.1–1.33)
CALCIUM SERPL-MCNC: 8.3 MG/DL (ref 8.6–10.3)
CALCIUM SERPL-MCNC: 8.5 MG/DL (ref 8.6–10.3)
CHLORIDE BLDA-SCNC: 99 MMOL/L (ref 98–107)
CHLORIDE SERPL-SCNC: 96 MMOL/L (ref 98–107)
CHLORIDE SERPL-SCNC: 99 MMOL/L (ref 98–107)
CK SERPL-CCNC: 468 U/L (ref 0–325)
CO2 SERPL-SCNC: 26 MMOL/L (ref 21–32)
CO2 SERPL-SCNC: 28 MMOL/L (ref 21–32)
CREAT SERPL-MCNC: 0.59 MG/DL (ref 0.5–1.3)
CREAT SERPL-MCNC: 0.64 MG/DL (ref 0.5–1.3)
EGFRCR SERPLBLD CKD-EPI 2021: >90 ML/MIN/1.73M*2
EGFRCR SERPLBLD CKD-EPI 2021: >90 ML/MIN/1.73M*2
EOSINOPHIL # BLD AUTO: 0.01 X10*3/UL (ref 0–0.7)
EOSINOPHIL NFR BLD AUTO: 0.1 %
ERYTHROCYTE [DISTWIDTH] IN BLOOD BY AUTOMATED COUNT: 12.9 % (ref 11.5–14.5)
GLUCOSE BLD MANUAL STRIP-MCNC: 110 MG/DL (ref 74–99)
GLUCOSE BLD MANUAL STRIP-MCNC: 112 MG/DL (ref 74–99)
GLUCOSE BLD MANUAL STRIP-MCNC: 112 MG/DL (ref 74–99)
GLUCOSE BLD MANUAL STRIP-MCNC: 119 MG/DL (ref 74–99)
GLUCOSE BLD MANUAL STRIP-MCNC: 133 MG/DL (ref 74–99)
GLUCOSE BLDA-MCNC: 133 MG/DL (ref 74–99)
GLUCOSE SERPL-MCNC: 115 MG/DL (ref 74–99)
GLUCOSE SERPL-MCNC: 96 MG/DL (ref 74–99)
HCO3 BLDA-SCNC: 26.3 MMOL/L (ref 22–26)
HCT VFR BLD AUTO: 39.1 % (ref 41–52)
HCT VFR BLD EST: 43 % (ref 41–52)
HGB BLD-MCNC: 13.5 G/DL (ref 13.5–17.5)
HGB BLDA-MCNC: 14.4 G/DL (ref 13.5–17.5)
IMM GRANULOCYTES # BLD AUTO: 0.04 X10*3/UL (ref 0–0.7)
IMM GRANULOCYTES NFR BLD AUTO: 0.4 % (ref 0–0.9)
INHALED O2 CONCENTRATION: 50 %
LACTATE BLDA-SCNC: 2.2 MMOL/L (ref 0.4–2)
LACTATE SERPL-SCNC: 3 MMOL/L (ref 0.4–2)
LACTATE SERPL-SCNC: 3.3 MMOL/L (ref 0.4–2)
LEGIONELLA AG UR QL: NEGATIVE
LYMPHOCYTES # BLD AUTO: 1.58 X10*3/UL (ref 1.2–4.8)
LYMPHOCYTES NFR BLD AUTO: 15.3 %
MAGNESIUM SERPL-MCNC: 1.1 MG/DL (ref 1.6–2.4)
MAGNESIUM SERPL-MCNC: 2.15 MG/DL (ref 1.6–2.4)
MCH RBC QN AUTO: 32.8 PG (ref 26–34)
MCHC RBC AUTO-ENTMCNC: 34.5 G/DL (ref 32–36)
MCV RBC AUTO: 95 FL (ref 80–100)
MONOCYTES # BLD AUTO: 1.14 X10*3/UL (ref 0.1–1)
MONOCYTES NFR BLD AUTO: 11 %
NEUTROPHILS # BLD AUTO: 7.53 X10*3/UL (ref 1.2–7.7)
NEUTROPHILS NFR BLD AUTO: 72.8 %
NRBC BLD-RTO: 0 /100 WBCS (ref 0–0)
OSMOLALITY SERPL: 350 MOSM/KG (ref 280–300)
OXYHGB MFR BLDA: 96.8 % (ref 94–98)
PCO2 BLDA: 33 MM HG (ref 38–42)
PH BLDA: 7.51 PH (ref 7.38–7.42)
PHOSPHATE SERPL-MCNC: 2.5 MG/DL (ref 2.5–4.9)
PHOSPHATE SERPL-MCNC: 3.4 MG/DL (ref 2.5–4.9)
PLATELET # BLD AUTO: 295 X10*3/UL (ref 150–450)
PO2 BLDA: 141 MM HG (ref 85–95)
POTASSIUM BLDA-SCNC: 3.9 MMOL/L (ref 3.5–5.3)
POTASSIUM SERPL-SCNC: 3.5 MMOL/L (ref 3.5–5.3)
POTASSIUM SERPL-SCNC: 3.7 MMOL/L (ref 3.5–5.3)
PROCALCITONIN SERPL-MCNC: 0.03 NG/ML
PROT SERPL-MCNC: 6.3 G/DL (ref 6.4–8.2)
RBC # BLD AUTO: 4.11 X10*6/UL (ref 4.5–5.9)
S PNEUM AG UR QL: NEGATIVE
SAO2 % BLDA: 100 % (ref 94–100)
SODIUM BLDA-SCNC: 131 MMOL/L (ref 136–145)
SODIUM SERPL-SCNC: 133 MMOL/L (ref 136–145)
SODIUM SERPL-SCNC: 136 MMOL/L (ref 136–145)
WBC # BLD AUTO: 10.3 X10*3/UL (ref 4.4–11.3)

## 2024-06-27 PROCEDURE — 71045 X-RAY EXAM CHEST 1 VIEW: CPT | Performed by: RADIOLOGY

## 2024-06-27 PROCEDURE — 71045 X-RAY EXAM CHEST 1 VIEW: CPT

## 2024-06-27 PROCEDURE — 83735 ASSAY OF MAGNESIUM: CPT

## 2024-06-27 PROCEDURE — 2500000004 HC RX 250 GENERAL PHARMACY W/ HCPCS (ALT 636 FOR OP/ED)

## 2024-06-27 PROCEDURE — 99291 CRITICAL CARE FIRST HOUR: CPT

## 2024-06-27 PROCEDURE — 80053 COMPREHEN METABOLIC PANEL: CPT

## 2024-06-27 PROCEDURE — 84132 ASSAY OF SERUM POTASSIUM: CPT

## 2024-06-27 PROCEDURE — 83880 ASSAY OF NATRIURETIC PEPTIDE: CPT

## 2024-06-27 PROCEDURE — 84100 ASSAY OF PHOSPHORUS: CPT

## 2024-06-27 PROCEDURE — 36415 COLL VENOUS BLD VENIPUNCTURE: CPT

## 2024-06-27 PROCEDURE — 82947 ASSAY GLUCOSE BLOOD QUANT: CPT

## 2024-06-27 PROCEDURE — 83605 ASSAY OF LACTIC ACID: CPT

## 2024-06-27 PROCEDURE — 82550 ASSAY OF CK (CPK): CPT

## 2024-06-27 PROCEDURE — 93306 TTE W/DOPPLER COMPLETE: CPT

## 2024-06-27 PROCEDURE — 36600 WITHDRAWAL OF ARTERIAL BLOOD: CPT

## 2024-06-27 PROCEDURE — 2020000001 HC ICU ROOM DAILY

## 2024-06-27 PROCEDURE — 51702 INSERT TEMP BLADDER CATH: CPT

## 2024-06-27 PROCEDURE — 85025 COMPLETE CBC W/AUTO DIFF WBC: CPT

## 2024-06-27 PROCEDURE — 2500000005 HC RX 250 GENERAL PHARMACY W/O HCPCS

## 2024-06-27 PROCEDURE — 2500000002 HC RX 250 W HCPCS SELF ADMINISTERED DRUGS (ALT 637 FOR MEDICARE OP, ALT 636 FOR OP/ED)

## 2024-06-27 PROCEDURE — 93306 TTE W/DOPPLER COMPLETE: CPT | Performed by: INTERNAL MEDICINE

## 2024-06-27 RX ORDER — LORAZEPAM 2 MG/ML
1 INJECTION INTRAMUSCULAR EVERY 2 HOUR PRN
Status: ACTIVE | OUTPATIENT
Start: 2024-06-27

## 2024-06-27 RX ORDER — POTASSIUM CHLORIDE 14.9 MG/ML
20 INJECTION INTRAVENOUS
Status: COMPLETED | OUTPATIENT
Start: 2024-06-27 | End: 2024-06-27

## 2024-06-27 RX ORDER — SODIUM CHLORIDE, SODIUM LACTATE, POTASSIUM CHLORIDE, CALCIUM CHLORIDE 600; 310; 30; 20 MG/100ML; MG/100ML; MG/100ML; MG/100ML
125 INJECTION, SOLUTION INTRAVENOUS CONTINUOUS
Status: DISCONTINUED | OUTPATIENT
Start: 2024-06-27 | End: 2024-06-27

## 2024-06-27 RX ORDER — THIAMINE HYDROCHLORIDE 100 MG/ML
100 INJECTION, SOLUTION INTRAMUSCULAR; INTRAVENOUS DAILY
Status: COMPLETED | OUTPATIENT
Start: 2024-06-27 | End: 2024-06-28

## 2024-06-27 RX ORDER — FUROSEMIDE 10 MG/ML
40 INJECTION INTRAMUSCULAR; INTRAVENOUS ONCE
Status: COMPLETED | OUTPATIENT
Start: 2024-06-27 | End: 2024-06-27

## 2024-06-27 RX ORDER — LORAZEPAM 2 MG/ML
2 INJECTION INTRAMUSCULAR EVERY 2 HOUR PRN
Status: DISPENSED | OUTPATIENT
Start: 2024-06-27

## 2024-06-27 RX ORDER — MAGNESIUM SULFATE HEPTAHYDRATE 40 MG/ML
4 INJECTION, SOLUTION INTRAVENOUS ONCE
Status: DISCONTINUED | OUTPATIENT
Start: 2024-06-27 | End: 2024-06-27

## 2024-06-27 RX ORDER — FOLIC ACID 1 MG/1
1 TABLET ORAL DAILY
Status: DISPENSED | OUTPATIENT
Start: 2024-06-27

## 2024-06-27 RX ORDER — ENOXAPARIN SODIUM 100 MG/ML
40 INJECTION SUBCUTANEOUS DAILY
Status: DISPENSED | OUTPATIENT
Start: 2024-06-27

## 2024-06-27 RX ORDER — MAGNESIUM SULFATE HEPTAHYDRATE 40 MG/ML
4 INJECTION, SOLUTION INTRAVENOUS ONCE
Status: COMPLETED | OUTPATIENT
Start: 2024-06-27 | End: 2024-06-27

## 2024-06-27 RX ORDER — MULTIVIT-MIN/IRON FUM/FOLIC AC 7.5 MG-4
1 TABLET ORAL DAILY
Status: DISPENSED | OUTPATIENT
Start: 2024-06-27

## 2024-06-27 RX ORDER — LANOLIN ALCOHOL/MO/W.PET/CERES
100 CREAM (GRAM) TOPICAL DAILY
Status: DISPENSED | OUTPATIENT
Start: 2024-06-29

## 2024-06-27 RX ORDER — LORAZEPAM 2 MG/ML
0.5 INJECTION INTRAMUSCULAR EVERY 2 HOUR PRN
Status: DISPENSED | OUTPATIENT
Start: 2024-06-27

## 2024-06-27 ASSESSMENT — LIFESTYLE VARIABLES
TREMOR: MODERATE, WITH PATIENT'S ARMS EXTENDED
ORIENTATION AND CLOUDING OF SENSORIUM: DISORIENTED FOR DATE BY MORE THAN 2 CALENDAR DAYS
NAUSEA AND VOMITING: NO NAUSEA AND NO VOMITING
AGITATION: MODERATELY FIDGETY AND RESTLESS
NAUSEA AND VOMITING: NO NAUSEA AND NO VOMITING
HEADACHE, FULLNESS IN HEAD: NOT PRESENT
VISUAL DISTURBANCES: NOT PRESENT
PAROXYSMAL SWEATS: 2
PAROXYSMAL SWEATS: BEADS OF SWEAT OBVIOUS ON FOREHEAD
ANXIETY: NO ANXIETY, AT EASE
AUDITORY DISTURBANCES: NOT PRESENT
ORIENTATION AND CLOUDING OF SENSORIUM: DISORIENTED FOR DATE BY MORE THAN 2 CALENDAR DAYS
TOTAL SCORE: 18
TOTAL SCORE: 4
VISUAL DISTURBANCES: NOT PRESENT
ANXIETY: NO ANXIETY, AT EASE
TACTILE DISTURBANCES: MILD ITCHING, PINS AND NEEDLES, BURNING OR NUMBNESS
NAUSEA AND VOMITING: NO NAUSEA AND NO VOMITING
NAUSEA AND VOMITING: NO NAUSEA AND NO VOMITING
TOTAL SCORE: 13
HEADACHE, FULLNESS IN HEAD: NOT PRESENT
TACTILE DISTURBANCES: MILD ITCHING, PINS AND NEEDLES, BURNING OR NUMBNESS
AGITATION: 3
ORIENTATION AND CLOUDING OF SENSORIUM: DISORIENTED FOR PLACE OR PERSON
TREMOR: MODERATE, WITH PATIENT'S ARMS EXTENDED
ORIENTATION AND CLOUDING OF SENSORIUM: DISORIENTED FOR PLACE OR PERSON
VISUAL DISTURBANCES: NOT PRESENT
ORIENTATION AND CLOUDING OF SENSORIUM: DISORIENTED FOR DATE BY MORE THAN 2 CALENDAR DAYS
AUDITORY DISTURBANCES: NOT PRESENT
HEADACHE, FULLNESS IN HEAD: NOT PRESENT
NAUSEA AND VOMITING: NO NAUSEA AND NO VOMITING
AGITATION: NORMAL ACTIVITY
AGITATION: MODERATELY FIDGETY AND RESTLESS
PAROXYSMAL SWEATS: NO SWEAT VISIBLE
ANXIETY: MODERATELY ANXIOUS, OR GUARDED, SO ANXIETY IS INFERRED
AGITATION: NORMAL ACTIVITY
VISUAL DISTURBANCES: NOT PRESENT
TREMOR: 5
AUDITORY DISTURBANCES: NOT PRESENT
PAROXYSMAL SWEATS: NO SWEAT VISIBLE
HEADACHE, FULLNESS IN HEAD: NOT PRESENT
ORIENTATION AND CLOUDING OF SENSORIUM: DISORIENTED FOR DATE BY MORE THAN 2 CALENDAR DAYS
VISUAL DISTURBANCES: NOT PRESENT
PAROXYSMAL SWEATS: NO SWEAT VISIBLE
AUDITORY DISTURBANCES: NOT PRESENT
ORIENTATION AND CLOUDING OF SENSORIUM: DISORIENTED FOR PLACE OR PERSON
PAROXYSMAL SWEATS: NO SWEAT VISIBLE
ORIENTATION AND CLOUDING OF SENSORIUM: DISORIENTED FOR DATE BY MORE THAN 2 CALENDAR DAYS
TOTAL SCORE: 22
HEADACHE, FULLNESS IN HEAD: NOT PRESENT
TOTAL SCORE: 3
TREMOR: 6
HEADACHE, FULLNESS IN HEAD: NOT PRESENT
ORIENTATION AND CLOUDING OF SENSORIUM: DISORIENTED FOR PLACE OR PERSON
AUDITORY DISTURBANCES: NOT PRESENT
NAUSEA AND VOMITING: NO NAUSEA AND NO VOMITING
PAROXYSMAL SWEATS: 2
AGITATION: NORMAL ACTIVITY
TREMOR: 2
TREMOR: NO TREMOR
HEADACHE, FULLNESS IN HEAD: NOT PRESENT
VISUAL DISTURBANCES: NOT PRESENT
VISUAL DISTURBANCES: NOT PRESENT
TREMOR: NO TREMOR
AUDITORY DISTURBANCES: NOT PRESENT
TOTAL SCORE: 5
HEADACHE, FULLNESS IN HEAD: NOT PRESENT
VISUAL DISTURBANCES: MODERATELY SEVERE HALLUCINATIONS
VISUAL DISTURBANCES: NOT PRESENT
PAROXYSMAL SWEATS: NO SWEAT VISIBLE
ANXIETY: MODERATELY ANXIOUS, OR GUARDED, SO ANXIETY IS INFERRED
AGITATION: NORMAL ACTIVITY
TOTAL SCORE: 4
TREMOR: NO TREMOR
HEADACHE, FULLNESS IN HEAD: NOT PRESENT
TREMOR: NO TREMOR
AGITATION: NORMAL ACTIVITY
TREMOR: NO TREMOR
ANXIETY: NO ANXIETY, AT EASE
NAUSEA AND VOMITING: NO NAUSEA AND NO VOMITING
ORIENTATION AND CLOUDING OF SENSORIUM: DISORIENTED FOR DATE BY MORE THAN 2 CALENDAR DAYS
AUDITORY DISTURBANCES: NOT PRESENT
VISUAL DISTURBANCES: NOT PRESENT
ANXIETY: MODERATELY ANXIOUS, OR GUARDED, SO ANXIETY IS INFERRED
TREMOR: NO TREMOR
TOTAL SCORE: 4
PAROXYSMAL SWEATS: NO SWEAT VISIBLE
AGITATION: NORMAL ACTIVITY
ANXIETY: NO ANXIETY, AT EASE
HEADACHE, FULLNESS IN HEAD: NOT PRESENT
NAUSEA AND VOMITING: NO NAUSEA AND NO VOMITING
ANXIETY: NO ANXIETY, AT EASE
HEADACHE, FULLNESS IN HEAD: NOT PRESENT
TOTAL SCORE: 24
TOTAL SCORE: 3
AGITATION: NORMAL ACTIVITY
TOTAL SCORE: 4
AGITATION: NORMAL ACTIVITY
ANXIETY: NO ANXIETY, AT EASE
PAROXYSMAL SWEATS: BEADS OF SWEAT OBVIOUS ON FOREHEAD
VISUAL DISTURBANCES: NOT PRESENT
AUDITORY DISTURBANCES: NOT PRESENT
NAUSEA AND VOMITING: NO NAUSEA AND NO VOMITING
NAUSEA AND VOMITING: NO NAUSEA AND NO VOMITING
PAROXYSMAL SWEATS: NO SWEAT VISIBLE
ORIENTATION AND CLOUDING OF SENSORIUM: DISORIENTED FOR DATE BY MORE THAN 2 CALENDAR DAYS
NAUSEA AND VOMITING: NO NAUSEA AND NO VOMITING
AUDITORY DISTURBANCES: NOT PRESENT
TACTILE DISTURBANCES: MODERATE ITCHING, PINS AND NEEDLES, BURNING OR NUMBNESS

## 2024-06-27 ASSESSMENT — COGNITIVE AND FUNCTIONAL STATUS - GENERAL
MOBILITY SCORE: 6
PERSONAL GROOMING: TOTAL
TURNING FROM BACK TO SIDE WHILE IN FLAT BAD: TOTAL
TOILETING: TOTAL
CLIMB 3 TO 5 STEPS WITH RAILING: TOTAL
MOVING TO AND FROM BED TO CHAIR: TOTAL
DAILY ACTIVITIY SCORE: 6
EATING MEALS: TOTAL
STANDING UP FROM CHAIR USING ARMS: TOTAL
MOVING FROM LYING ON BACK TO SITTING ON SIDE OF FLAT BED WITH BEDRAILS: TOTAL
HELP NEEDED FOR BATHING: TOTAL
WALKING IN HOSPITAL ROOM: TOTAL
DRESSING REGULAR UPPER BODY CLOTHING: TOTAL
DRESSING REGULAR LOWER BODY CLOTHING: TOTAL

## 2024-06-27 ASSESSMENT — PAIN - FUNCTIONAL ASSESSMENT
PAIN_FUNCTIONAL_ASSESSMENT: 0-10
PAIN_FUNCTIONAL_ASSESSMENT: CPOT (CRITICAL CARE PAIN OBSERVATION TOOL)
PAIN_FUNCTIONAL_ASSESSMENT: 0-10
PAIN_FUNCTIONAL_ASSESSMENT: CPOT (CRITICAL CARE PAIN OBSERVATION TOOL)
PAIN_FUNCTIONAL_ASSESSMENT: 0-10

## 2024-06-27 ASSESSMENT — PAIN SCALES - GENERAL
PAINLEVEL_OUTOF10: 0 - NO PAIN

## 2024-06-27 NOTE — PROGRESS NOTES
Social work asked by [facility] to see patient for etoh. Spoke with RN who advised patient is not appropriate to talk to today. Social work will follow and meet with patient when he can participate in meaningful conversation.  PADMINI Blair

## 2024-06-27 NOTE — CARE PLAN
Problem: Fall/Injury  Goal: Verbalize understanding of personal risk factors for fall in the hospital  Outcome: Progressing  Goal: Verbalize understanding of risk factor reduction measures to prevent injury from fall in the home  Outcome: Progressing     Problem: Anxiety  Goal: Attempts to manage anxiety with help  Outcome: Progressing  Goal: Verbalizes ways to manage anxiety  Outcome: Progressing  Goal: Implements measures to reduce anxiety  Outcome: Progressing  Goal: Free from restraint events  Outcome: Progressing     Problem: Anxiety  Goal: Will report anxiety at manageable levels  Outcome: Progressing     Problem: Coping  Goal: Pt/Family able to verbalize concerns and demonstrate effective coping strategies  Outcome: Progressing     Problem: Decision Making  Goal: Pt/Family able to effectively weigh alternatives and participate in decision making related to treatment and care  Outcome: Progressing     Problem: Behavior  Goal: Pt/Family maintain appropriate behavior and adhere to behavioral management agreement, if implemented  Outcome: Progressing     Problem: Drug Abuse/Detox  Goal: Will have no detox symptoms and will verbalize plan for changing drug-related behavior  Outcome: Progressing     Problem: Indwelling Catheter Maintenance  Goal: I will have no complications from indwelling catheter  Outcome: Progressing     Problem: Skin  Goal: Participates in plan/prevention/treatment measures  Outcome: Progressing  Flowsheets (Taken 6/27/2024 1728)  Participates in plan/prevention/treatment measures: Elevate heels  Goal: Prevent/minimize sheer/friction injuries  Outcome: Progressing  Flowsheets (Taken 6/27/2024 1728)  Prevent/minimize sheer/friction injuries: Turn/reposition every 2 hours/use positioning/transfer devices     Problem: Nutrition  Goal: Less than 5 days NPO/clear liquids  Outcome: Progressing  Goal: Oral intake greater 75%  Outcome: Progressing  Goal: Adequate PO fluid intake  Outcome:  Progressing  Goal: Nutrition support is meeting 75% of nutrient needs  Outcome: Progressing  Goal: BG  mg/dL  Outcome: Progressing  Goal: Lab values WNL  Outcome: Progressing  Goal: Electrolytes WNL  Outcome: Progressing     Problem: Pain - Adult  Goal: Verbalizes/displays adequate comfort level or baseline comfort level  Outcome: Progressing     Problem: Safety - Adult  Goal: Free from fall injury  Outcome: Progressing     Problem: Discharge Planning  Goal: Discharge to home or other facility with appropriate resources  Outcome: Progressing     Problem: Chronic Conditions and Co-morbidities  Goal: Patient's chronic conditions and co-morbidity symptoms are monitored and maintained or improved  Outcome: Progressing     Problem: Fall/Injury  Goal: Not fall by end of shift  Outcome: Met  Goal: Be free from injury by end of the shift  Outcome: Met     Problem: Respiratory  Goal: Patent airway maintained this shift  Outcome: Met  Goal: Wean oxygen to maintain O2 saturation per order/standard this shift  Outcome: Met   The patient's goals for the shift include      The clinical goals for the shift include Patient will maintain CIWA score < 10 this shift    Over the shift, the patient did not make progress toward the following goals. Barriers to progression include acuteness of illness. Recommendations to address these barriers include continue with the plan of care.

## 2024-06-27 NOTE — PROGRESS NOTES
Critical Care Daily Progress        Subjective   Patient is a 58 y.o. male admitted on 6/26/2024  2:22 PM with the following indication(s) for ICU care of Alcohol Withdrawal with CIWA >20. .     Overnight Events: Overnight received 22mg Ativan, CIWAs peaked over 20s. Destated to 80s overnight and was placed on non-breather.    Complaints: Winces to pain with suprapubic palpation. Very somnolent, will open eyes to verbal and painful stimuli, no verbal communications.     Scheduled Medications:   azithromycin, 500 mg, intravenous, q24h  cefTRIAXone, 2 g, intravenous, q24h  diazePAM, 10 mg, intravenous, q8h  folic acid, 1 mg, oral, Daily  magnesium sulfate, 4 g, intravenous, Once  multivitamin with minerals, 1 tablet, oral, Daily  potassium chloride, 20 mEq, intravenous, q2h  QUEtiapine, 25 mg, oral, Nightly  sertraline, 50 mg, oral, Nightly  [START ON 6/29/2024] thiamine, 100 mg, oral, Daily  thiamine, 100 mg, intravenous, Daily         Continuous Medications:   [Held by provider] lactated Ringer's, 125 mL/hr, Last Rate: Stopped (06/27/24 0810)         PRN Medications:   PRN medications: labetaloL, LORazepam **OR** LORazepam **OR** LORazepam, magnesium sulfate, magnesium sulfate, oxygen, potassium chloride CR **OR** potassium chloride, potassium chloride    Objective   Vitals:  Temp  Min: 36.2 °C (97.2 °F)  Max: 37 °C (98.6 °F)  Pulse  Min: 86  Max: 128  BP  Min: 129/89  Max: 207/119  Resp  Min: 16  Max: 60  SpO2  Min: 86 %  Max: 99 %    Physical Exam:   Physical Exam   Physical Exam  Constitutional: GCS 10, appears to have increased work of breathing   Head and Face: Atraumatic, normocephalic   Eyes: Normal external exam, EOMI  ENT: moist mucous membranes, normal external inspection of ears and nose. Oropharynx normal.  Cardiovascular: RRR, S1/S2, no murmurs, rubs, or gallops, radial pulses +2  Pulmonary: diffuse crackles, increased respiratory rate, minor use of accessory muscles, no wheezing, rales or rhonchi, on  non-re-rebreather   Abdomen: +BS, soft, distended and tender suprapubic region, no guarding rigidity or rebound tenderness, no masses noted  MSK: Negative for edema, No joint swelling, normal movements of all extremities.   Neuro: GCS 10, will open eyes to verbal stimuli but does not follow commands, very somnolent, b/l upper and lower extremity tremors, No focal deficits, normal motor function, normal sensation, follows all commands, no asterixis   Skin- Diaphoretic, No lesions, contusions, or erythema.  Psychiatric: Anxious, delirious          Assessment/Plan   Patient is a 58-year-old male with PMHx of alcohol use disorder, anxiety, depression presented via EMS for wellness visit after being withdrawn for multiple days admitted to ICU for CIWA >20.      Neuro: Delirium Tremens 2/2 EtOH withdrawal syndrome  -History:   EtOh use disorder              EtOH hx: 20-30 years              Beverage of choice: wine (multiple bottles a day recently)              Endorses 5x days of daily wine consumption              Last known drink was 9PM 6/25              Hx of EtOH withdrawal hospitalization x1 (no seizure)              Has had periods of sobriety              No concurrent drug use              Possible SA as inciting stressor             Anxiety/Depression              Takes Seroquel and ativen at night for sleep              Takes Zoloft 50mg  -Home meds: Ativan, Seroquel, zoloft   -A&Ox2  -CIWA (score of 21 in ED)  -CAM ICU  -Ativan prn (holding in setting of sedation)  -Valium 10mg q8 (holding)  -Folic acid, multivitamin  -IV thiamine x3 days (6/26-)  -Psychiatry consulted  -Seizure precautions  -Urine drug screen negative     Cardiac: AHRF/HTN/elevated BNP concerning for alcohol induced cardiomyopathy versus tachycardia/hypertension induced cardiomyopathy  - History: None  - Goals: [MAP> 65, HR ]  - Home meds: None  - Last echo: N/A  - ECHO  -   - If new onset CHF will bring cardiology on board      Pulmonary: AHRF 2/2 pulmonary edema with fluid overload clinical appearance, CAP  -History: None  -Home meds: None  Continuous pulse oximetry   O2 PRN to maintain SpO2 > 94%, wean as tolerated  -Imaging: CXR showed Patchy left basilar infiltrate or atelectasis and probable small left pleural effusion.   -Incentive spirometer  - Repeat CXR show slightly worsening pulmonary edema  - 1x 40 lasix  - Hold valium/ativan to prevent respiratory sedation      Gastrointestinal: No active concerns  -History: None  -Home meds: None  - Diet: N.p.o.  - Prophylaxis: Not indicated  - Bowel regimen: miralax  - Last BM:       Renal: Lactic acidosis possibly 2/2 dehydration, acute urinary retention  - Daily RFP,Mg,Phos  -Monitor I's and O's  - Lange with strict I/O  Net IO Since Admission: 5,029.58 mL [24 0919]  Results from last 72 hours   Lab Units 24  0509 24  1429   BUN mg/dL 5* 8   CREATININE mg/dL 0.64 0.73       - Lactic Acidosis improved with fluids  - Fluids: holding all fluids, s/p 4 L + maintence fluids  - Electrolytes: Replete per protocol, goal K>4 Phos >3 Mg >2    Endocrine: None  -History: None  -Home meds: None  -sliding scale: Mild  Results from last 7 days   Lab Units 24  0756 24  0509 24  0432 24  2343 24  2000 24  1429 24  1426   POCT GLUCOSE mg/dL 119*  --  110* 116* 98  --  84   GLUCOSE mg/dL  --  115*  --   --   --  93  --       -BG < 180 goal    Hematology: No active concerns   - Daily CBC, coags    Results from last 7 days   Lab Units 24  0509 24  1429   WBC AUTO x10*3/uL 10.3 8.8   HEMOGLOBIN g/dL 13.5 15.1   HEMATOCRIT % 39.1* 44.5   PLATELETS AUTO x10*3/uL 295 406     - DVT Prophylaxis: SCDs    Infectious Disease: SIRS criteria with CAP?  -History: Not having and fevers, cough  Temp (24hrs), Av.6 °C (97.9 °F), Min:36.2 °C (97.2 °F), Max:37 °C (98.6 °F)     -Tachycardia, lactic acidosis, hypertension with radiographic findings of  CAP; however patient does not appear clinically to have sepsis or obvious pna at this time. Will treat PNA with rocephin however not concerned at this point for true sepsis. Will repeat lactate after 2 L of fluids as patient was very dry on exam. Will also send for a procal and urine antigens.   -Pro-Cristino negative, giving supporting evidence with a high NPV that there is unlikely pneumonia.  However given unclear symptoms and source of lactic acidosis we will continue antibiotics.     -Abx: CTX, Doxy  -Cultures:               Blood Culture (6/26)              Urine Culture (6/26): Negative UA    LDA:  External Urinary Catheter (Active)   Placement Date/Time: 06/26/24 1651     Number of days: 0         CODE STATUS: Full Code    Disposition: Patient to remain in ICU with CIWA score >20    ABCDEF Checklist  Analgesia: Spontaneous awakening trial to be pursued if clinically appropriate. RASS goal reviewed  Breathing: Spontaneous breathing trial to be pursued if clinically appropriate. Mechanical power of assisted ventilation reviewed  Choice of analgesia/sedation: Analgesic and sedative agents adjusted per clinical context.   Delirium assessed by CAM, will avoid exacerbating factors  Early mobility and exercise: Physical and occupational therapy engaged  Family: Plan of care, overall trajectory of patient shared with family. Questions elicited and answered as appropriate.       Due to the high probability of life threatening clinical decompensation, the patient required critical care time evaluating and managing this patient.  Critical care time included obtaining a history, examining the patient, ordering and reviewing studies, discussing, developing, and implementing a management plan, evaluating the patient's response to treatment, and discussion with other care team providers. I saw and evaluated the patient myself.  Critical care time was performed exclusive of billable procedures.      Critical care time: 60mins       Case discussed with attending , Dr. Niharika Vasquez Hostoffer, DO  Internal Medicine, PGY-1

## 2024-06-28 LAB
ALBUMIN SERPL BCP-MCNC: 3.6 G/DL (ref 3.4–5)
ALP SERPL-CCNC: 57 U/L (ref 33–120)
ALT SERPL W P-5'-P-CCNC: 7 U/L (ref 10–52)
ANION GAP SERPL CALC-SCNC: 12 MMOL/L (ref 10–20)
AORTIC VALVE PEAK VELOCITY: 1.16 M/S
AST SERPL W P-5'-P-CCNC: 12 U/L (ref 9–39)
AV PEAK GRADIENT: 5.4 MMHG
AVA (PEAK VEL): 3.38 CM2
BACTERIA UR CULT: NORMAL
BASOPHILS # BLD AUTO: 0.04 X10*3/UL (ref 0–0.1)
BASOPHILS NFR BLD AUTO: 0.5 %
BILIRUB SERPL-MCNC: 1.1 MG/DL (ref 0–1.2)
BUN SERPL-MCNC: 7 MG/DL (ref 6–23)
CALCIUM SERPL-MCNC: 8.6 MG/DL (ref 8.6–10.3)
CHLORIDE SERPL-SCNC: 100 MMOL/L (ref 98–107)
CK SERPL-CCNC: 203 U/L (ref 0–325)
CO2 SERPL-SCNC: 26 MMOL/L (ref 21–32)
CREAT SERPL-MCNC: 0.71 MG/DL (ref 0.5–1.3)
EGFRCR SERPLBLD CKD-EPI 2021: >90 ML/MIN/1.73M*2
EJECTION FRACTION APICAL 4 CHAMBER: 51.7
EJECTION FRACTION: 54 %
EOSINOPHIL # BLD AUTO: 0.22 X10*3/UL (ref 0–0.7)
EOSINOPHIL NFR BLD AUTO: 2.7 %
ERYTHROCYTE [DISTWIDTH] IN BLOOD BY AUTOMATED COUNT: 12.8 % (ref 11.5–14.5)
GLUCOSE BLD MANUAL STRIP-MCNC: 102 MG/DL (ref 74–99)
GLUCOSE BLD MANUAL STRIP-MCNC: 118 MG/DL (ref 74–99)
GLUCOSE SERPL-MCNC: 107 MG/DL (ref 74–99)
HCT VFR BLD AUTO: 40.2 % (ref 41–52)
HGB BLD-MCNC: 13.9 G/DL (ref 13.5–17.5)
IMM GRANULOCYTES # BLD AUTO: 0.03 X10*3/UL (ref 0–0.7)
IMM GRANULOCYTES NFR BLD AUTO: 0.4 % (ref 0–0.9)
LACTATE SERPL-SCNC: 1 MMOL/L (ref 0.4–2)
LEFT VENTRICLE INTERNAL DIMENSION DIASTOLE: 3.91 CM (ref 3.5–6)
LEFT VENTRICULAR OUTFLOW TRACT DIAMETER: 2.2 CM
LV EJECTION FRACTION BIPLANE: 54 %
LYMPHOCYTES # BLD AUTO: 2.09 X10*3/UL (ref 1.2–4.8)
LYMPHOCYTES NFR BLD AUTO: 25.4 %
MAGNESIUM SERPL-MCNC: 2.06 MG/DL (ref 1.6–2.4)
MCH RBC QN AUTO: 33.1 PG (ref 26–34)
MCHC RBC AUTO-ENTMCNC: 34.6 G/DL (ref 32–36)
MCV RBC AUTO: 96 FL (ref 80–100)
MITRAL VALVE E/A RATIO: 0.76
MITRAL VALVE E/E' RATIO: 9.8
MONOCYTES # BLD AUTO: 0.78 X10*3/UL (ref 0.1–1)
MONOCYTES NFR BLD AUTO: 9.5 %
NEUTROPHILS # BLD AUTO: 5.07 X10*3/UL (ref 1.2–7.7)
NEUTROPHILS NFR BLD AUTO: 61.5 %
NRBC BLD-RTO: 0 /100 WBCS (ref 0–0)
PHOSPHATE SERPL-MCNC: 3 MG/DL (ref 2.5–4.9)
PLATELET # BLD AUTO: 248 X10*3/UL (ref 150–450)
POTASSIUM SERPL-SCNC: 3.7 MMOL/L (ref 3.5–5.3)
PROT SERPL-MCNC: 6.5 G/DL (ref 6.4–8.2)
RBC # BLD AUTO: 4.2 X10*6/UL (ref 4.5–5.9)
RIGHT VENTRICLE PEAK SYSTOLIC PRESSURE: 21.7 MMHG
SODIUM SERPL-SCNC: 134 MMOL/L (ref 136–145)
TRICUSPID ANNULAR PLANE SYSTOLIC EXCURSION: 1.7 CM
TSH SERPL-ACNC: 2 MIU/L (ref 0.44–3.98)
WBC # BLD AUTO: 8.2 X10*3/UL (ref 4.4–11.3)

## 2024-06-28 PROCEDURE — 82550 ASSAY OF CK (CPK): CPT

## 2024-06-28 PROCEDURE — 36415 COLL VENOUS BLD VENIPUNCTURE: CPT

## 2024-06-28 PROCEDURE — 1100000001 HC PRIVATE ROOM DAILY

## 2024-06-28 PROCEDURE — 2500000002 HC RX 250 W HCPCS SELF ADMINISTERED DRUGS (ALT 637 FOR MEDICARE OP, ALT 636 FOR OP/ED): Performed by: PHYSICIAN ASSISTANT

## 2024-06-28 PROCEDURE — 2500000001 HC RX 250 WO HCPCS SELF ADMINISTERED DRUGS (ALT 637 FOR MEDICARE OP): Performed by: PHYSICIAN ASSISTANT

## 2024-06-28 PROCEDURE — 80053 COMPREHEN METABOLIC PANEL: CPT

## 2024-06-28 PROCEDURE — 2500000002 HC RX 250 W HCPCS SELF ADMINISTERED DRUGS (ALT 637 FOR MEDICARE OP, ALT 636 FOR OP/ED)

## 2024-06-28 PROCEDURE — 85025 COMPLETE CBC W/AUTO DIFF WBC: CPT

## 2024-06-28 PROCEDURE — 2500000001 HC RX 250 WO HCPCS SELF ADMINISTERED DRUGS (ALT 637 FOR MEDICARE OP)

## 2024-06-28 PROCEDURE — 84100 ASSAY OF PHOSPHORUS: CPT

## 2024-06-28 PROCEDURE — 2500000004 HC RX 250 GENERAL PHARMACY W/ HCPCS (ALT 636 FOR OP/ED): Performed by: PHYSICIAN ASSISTANT

## 2024-06-28 PROCEDURE — 2500000004 HC RX 250 GENERAL PHARMACY W/ HCPCS (ALT 636 FOR OP/ED)

## 2024-06-28 PROCEDURE — 99222 1ST HOSP IP/OBS MODERATE 55: CPT | Performed by: PSYCHIATRY & NEUROLOGY

## 2024-06-28 PROCEDURE — 99291 CRITICAL CARE FIRST HOUR: CPT

## 2024-06-28 PROCEDURE — 82947 ASSAY GLUCOSE BLOOD QUANT: CPT

## 2024-06-28 PROCEDURE — 97165 OT EVAL LOW COMPLEX 30 MIN: CPT | Mod: GO | Performed by: OCCUPATIONAL THERAPIST

## 2024-06-28 PROCEDURE — 83605 ASSAY OF LACTIC ACID: CPT

## 2024-06-28 PROCEDURE — 97161 PT EVAL LOW COMPLEX 20 MIN: CPT | Mod: GP

## 2024-06-28 PROCEDURE — 84443 ASSAY THYROID STIM HORMONE: CPT

## 2024-06-28 PROCEDURE — 83735 ASSAY OF MAGNESIUM: CPT

## 2024-06-28 RX ORDER — DOXYCYCLINE 100 MG/1
100 CAPSULE ORAL EVERY 12 HOURS SCHEDULED
Status: DISPENSED | OUTPATIENT
Start: 2024-06-28 | End: 2024-07-02

## 2024-06-28 RX ORDER — POTASSIUM CHLORIDE 20 MEQ/1
40 TABLET, EXTENDED RELEASE ORAL ONCE
Status: COMPLETED | OUTPATIENT
Start: 2024-06-28 | End: 2024-06-28

## 2024-06-28 ASSESSMENT — LIFESTYLE VARIABLES
NAUSEA AND VOMITING: NO NAUSEA AND NO VOMITING
AUDITORY DISTURBANCES: NOT PRESENT
ORIENTATION AND CLOUDING OF SENSORIUM: CANNOT DO SERIAL ADDITIONS OR IS UNCERTAIN ABOUT DATE
TOTAL SCORE: 0
PAROXYSMAL SWEATS: NO SWEAT VISIBLE
TREMOR: NO TREMOR
ORIENTATION AND CLOUDING OF SENSORIUM: ORIENTED AND CAN DO SERIAL ADDITIONS
VISUAL DISTURBANCES: NOT PRESENT
AUDITORY DISTURBANCES: NOT PRESENT
HEADACHE, FULLNESS IN HEAD: NOT PRESENT
AGITATION: NORMAL ACTIVITY
AUDITORY DISTURBANCES: NOT PRESENT
TOTAL SCORE: 0
NAUSEA AND VOMITING: NO NAUSEA AND NO VOMITING
ORIENTATION AND CLOUDING OF SENSORIUM: DISORIENTED FOR DATE BY MORE THAN 2 CALENDAR DAYS
NAUSEA AND VOMITING: NO NAUSEA AND NO VOMITING
PAROXYSMAL SWEATS: BEADS OF SWEAT OBVIOUS ON FOREHEAD
ORIENTATION AND CLOUDING OF SENSORIUM: ORIENTED AND CAN DO SERIAL ADDITIONS
PAROXYSMAL SWEATS: NO SWEAT VISIBLE
ANXIETY: NO ANXIETY, AT EASE
NAUSEA AND VOMITING: NO NAUSEA AND NO VOMITING
TREMOR: NO TREMOR
BLOOD PRESSURE: 128/90
TOTAL SCORE: 2
VISUAL DISTURBANCES: NOT PRESENT
AGITATION: 3
TOTAL SCORE: 3
TACTILE DISTURBANCES: VERY MILD ITCHING, PINS AND NEEDLES, BURNING OR NUMBNESS
HEADACHE, FULLNESS IN HEAD: NOT PRESENT
HEADACHE, FULLNESS IN HEAD: NOT PRESENT
AGITATION: NORMAL ACTIVITY
AUDITORY DISTURBANCES: NOT PRESENT
AUDITORY DISTURBANCES: NOT PRESENT
VISUAL DISTURBANCES: NOT PRESENT
ANXIETY: MILDLY ANXIOUS
PAROXYSMAL SWEATS: NO SWEAT VISIBLE
AUDITORY DISTURBANCES: NOT PRESENT
AUDITORY DISTURBANCES: NOT PRESENT
HEADACHE, FULLNESS IN HEAD: NOT PRESENT
ANXIETY: NO ANXIETY, AT EASE
VISUAL DISTURBANCES: NOT PRESENT
PAROXYSMAL SWEATS: NO SWEAT VISIBLE
PAROXYSMAL SWEATS: NO SWEAT VISIBLE
HEADACHE, FULLNESS IN HEAD: NOT PRESENT
ANXIETY: NO ANXIETY, AT EASE
TOTAL SCORE: 20
TREMOR: 2
ORIENTATION AND CLOUDING OF SENSORIUM: CANNOT DO SERIAL ADDITIONS OR IS UNCERTAIN ABOUT DATE
VISUAL DISTURBANCES: NOT PRESENT
VISUAL DISTURBANCES: NOT PRESENT
VISUAL DISTURBANCES: SEVERE HALLUCINATIONS
ORIENTATION AND CLOUDING OF SENSORIUM: DISORIENTED FOR DATA BY NO MORE THAN 2 CALENDAR DAYS
TREMOR: MODERATE, WITH PATIENT'S ARMS EXTENDED
ANXIETY: NO ANXIETY, AT EASE
TREMOR: NO TREMOR
TREMOR: NO TREMOR
PULSE: 103
NAUSEA AND VOMITING: NO NAUSEA AND NO VOMITING
NAUSEA AND VOMITING: NO NAUSEA AND NO VOMITING
PAROXYSMAL SWEATS: NO SWEAT VISIBLE
BLOOD PRESSURE: 105/73
HEADACHE, FULLNESS IN HEAD: NOT PRESENT
VISUAL DISTURBANCES: NOT PRESENT
PAROXYSMAL SWEATS: NO SWEAT VISIBLE
AGITATION: NORMAL ACTIVITY
AGITATION: NORMAL ACTIVITY
PAROXYSMAL SWEATS: NO SWEAT VISIBLE
AGITATION: 3
AUDITORY DISTURBANCES: NOT PRESENT
TOTAL SCORE: 20
TOTAL SCORE: 1
ORIENTATION AND CLOUDING OF SENSORIUM: DISORIENTED FOR DATA BY NO MORE THAN 2 CALENDAR DAYS
AGITATION: NORMAL ACTIVITY
ORIENTATION AND CLOUDING OF SENSORIUM: ORIENTED AND CAN DO SERIAL ADDITIONS
VISUAL DISTURBANCES: MODERATELY SEVERE HALLUCINATIONS
TREMOR: MODERATE, WITH PATIENT'S ARMS EXTENDED
TOTAL SCORE: 1
ORIENTATION AND CLOUDING OF SENSORIUM: ORIENTED AND CAN DO SERIAL ADDITIONS
NAUSEA AND VOMITING: NO NAUSEA AND NO VOMITING
HEADACHE, FULLNESS IN HEAD: NOT PRESENT
NAUSEA AND VOMITING: NO NAUSEA AND NO VOMITING
AGITATION: NORMAL ACTIVITY
HEADACHE, FULLNESS IN HEAD: NOT PRESENT
PULSE: 106
NAUSEA AND VOMITING: NO NAUSEA AND NO VOMITING
TREMOR: NO TREMOR
ANXIETY: MILDLY ANXIOUS
BLOOD PRESSURE: 108/68
TREMOR: NO TREMOR
PAROXYSMAL SWEATS: BEADS OF SWEAT OBVIOUS ON FOREHEAD
AUDITORY DISTURBANCES: NOT PRESENT
ORIENTATION AND CLOUDING OF SENSORIUM: DISORIENTED FOR DATE BY MORE THAN 2 CALENDAR DAYS
HEADACHE, FULLNESS IN HEAD: NOT PRESENT
NAUSEA AND VOMITING: NO NAUSEA AND NO VOMITING
VISUAL DISTURBANCES: NOT PRESENT
ANXIETY: NO ANXIETY, AT EASE
TOTAL SCORE: 2
HEADACHE, FULLNESS IN HEAD: NOT PRESENT
ANXIETY: MILDLY ANXIOUS
AGITATION: NORMAL ACTIVITY
AGITATION: NORMAL ACTIVITY
TOTAL SCORE: 0
AUDITORY DISTURBANCES: NOT PRESENT
TREMOR: NO TREMOR

## 2024-06-28 ASSESSMENT — PAIN - FUNCTIONAL ASSESSMENT
PAIN_FUNCTIONAL_ASSESSMENT: 0-10

## 2024-06-28 ASSESSMENT — COGNITIVE AND FUNCTIONAL STATUS - GENERAL
TURNING FROM BACK TO SIDE WHILE IN FLAT BAD: A LITTLE
TURNING FROM BACK TO SIDE WHILE IN FLAT BAD: A LITTLE
MOVING FROM LYING ON BACK TO SITTING ON SIDE OF FLAT BED WITH BEDRAILS: A LITTLE
PERSONAL GROOMING: A LITTLE
WALKING IN HOSPITAL ROOM: A LOT
CLIMB 3 TO 5 STEPS WITH RAILING: TOTAL
DRESSING REGULAR UPPER BODY CLOTHING: A LOT
TURNING FROM BACK TO SIDE WHILE IN FLAT BAD: A LITTLE
MOBILITY SCORE: 13
PERSONAL GROOMING: A LITTLE
DAILY ACTIVITIY SCORE: 14
MOVING TO AND FROM BED TO CHAIR: A LOT
CLIMB 3 TO 5 STEPS WITH RAILING: TOTAL
DRESSING REGULAR LOWER BODY CLOTHING: A LOT
HELP NEEDED FOR BATHING: A LOT
MOBILITY SCORE: 13
PERSONAL GROOMING: A LITTLE
EATING MEALS: A LITTLE
MOVING FROM LYING ON BACK TO SITTING ON SIDE OF FLAT BED WITH BEDRAILS: A LITTLE
DAILY ACTIVITIY SCORE: 14
CLIMB 3 TO 5 STEPS WITH RAILING: TOTAL
MOVING FROM LYING ON BACK TO SITTING ON SIDE OF FLAT BED WITH BEDRAILS: A LITTLE
HELP NEEDED FOR BATHING: A LOT
TOILETING: A LOT
DAILY ACTIVITIY SCORE: 14
MOVING TO AND FROM BED TO CHAIR: A LOT
STANDING UP FROM CHAIR USING ARMS: A LOT
STANDING UP FROM CHAIR USING ARMS: A LOT
MOBILITY SCORE: 13
DRESSING REGULAR LOWER BODY CLOTHING: A LOT
MOVING TO AND FROM BED TO CHAIR: A LOT
WALKING IN HOSPITAL ROOM: A LOT
STANDING UP FROM CHAIR USING ARMS: A LOT
EATING MEALS: A LITTLE
DRESSING REGULAR LOWER BODY CLOTHING: A LOT
DRESSING REGULAR UPPER BODY CLOTHING: A LOT
TOILETING: A LOT
EATING MEALS: A LITTLE
WALKING IN HOSPITAL ROOM: A LOT
DRESSING REGULAR UPPER BODY CLOTHING: A LOT
TOILETING: A LOT
HELP NEEDED FOR BATHING: A LOT

## 2024-06-28 ASSESSMENT — COLUMBIA-SUICIDE SEVERITY RATING SCALE - C-SSRS
1. SINCE LAST CONTACT, HAVE YOU WISHED YOU WERE DEAD OR WISHED YOU COULD GO TO SLEEP AND NOT WAKE UP?: NO
1. SINCE LAST CONTACT, HAVE YOU WISHED YOU WERE DEAD OR WISHED YOU COULD GO TO SLEEP AND NOT WAKE UP?: NO
6. HAVE YOU EVER DONE ANYTHING, STARTED TO DO ANYTHING, OR PREPARED TO DO ANYTHING TO END YOUR LIFE?: NO
6. HAVE YOU EVER DONE ANYTHING, STARTED TO DO ANYTHING, OR PREPARED TO DO ANYTHING TO END YOUR LIFE?: NO
2. HAVE YOU ACTUALLY HAD ANY THOUGHTS OF KILLING YOURSELF?: YES
5. HAVE YOU STARTED TO WORK OUT OR WORKED OUT THE DETAILS OF HOW TO KILL YOURSELF? DO YOU INTEND TO CARRY OUT THIS PLAN?: NO
5. HAVE YOU STARTED TO WORK OUT OR WORKED OUT THE DETAILS OF HOW TO KILL YOURSELF? DO YOU INTEND TO CARRY OUT THIS PLAN?: NO
2. HAVE YOU ACTUALLY HAD ANY THOUGHTS OF KILLING YOURSELF?: NO

## 2024-06-28 ASSESSMENT — ACTIVITIES OF DAILY LIVING (ADL): ADL_ASSISTANCE: INDEPENDENT

## 2024-06-28 ASSESSMENT — PAIN SCALES - GENERAL
PAINLEVEL_OUTOF10: 0 - NO PAIN

## 2024-06-28 NOTE — SIGNIFICANT EVENT
Application for Emergency Admission      Ready for Transfer?  Is the patient medically cleared for transfer to inpatient psychiatry: No  Has the patient been accepted to an inpatient psychiatric hospital: No    Application for Emergency Admission  IN ACCORDANCE WITH [place]  The Chief Clinical Officer of:  6/28/2024 .3:06 PM    Reason for Hospitalization  The undersigned has reason to believe that: Everton Geiger Is a mentally ill person subject to hospitalization by court order under division B Section 5122.01 of the Revised Code, i.e., this person:    1.Yes  Represents a substantial risk of physical harm to self as manifested by evidence of threats of, or attempts at, suicide or serious self-inflicted bodily harm    2.No Represents a substantial risk of physical harm to others as manifested by evidence of recent homicidal or other violent behavior, evidence of recent threats that place another in reasonable fear of violent behavior and serious physical harm, or other evidence of present dangerousness    3.No Represents a substantial and immediate risk of serious physical impairment or injury to self as manifested by  evidence that the person is unable to provide for and is not providing for the person's basic physical needs because of the person's mental illness and that appropriate provision for those needs cannot be made  immediately available in the community    4.Yes Would benefit from treatment in a hospital for his mental illness and is in need of such treatment as manifested by evidence of behavior that creates a grave and imminent risk to substantial rights of others or  himself.    5.No Would benefit from treatment as manifested by evidence of behavior that indicates all of the following:       (a) The person is unlikely to survive safely in the community without supervision, based on a clinical determination.       (b) The person has a history of lack of compliance with treatment for mental  illness and one of the following applies:      (i) At least twice within the thirty-six months prior to the filing of an affidavit seeking court-ordered treatment of the person under section 5122.111 of the Revised Code, the lack of compliance has been a significant factor in necessitating hospitalization in a hospital or receipt of services in a forensic or other mental health unit of a correctional facility, provided that the thirty-six-month period shall be extended by the length of any hospitalization or incarceration of the person that occurred within the thirty-six-month period.      (ii) Within the forty-eight months prior to the filing of an affidavit seeking court-ordered treatment of the person under section 5122.111 of the Revised Code, the lack of compliance resulted in one or more acts of serious violent behavior toward self or others or threats of, or attempts at, serious physical harm to self or others, provided that the forty-eight-month period shall be extended by the length of any hospitalization or incarceration of the person that occurred within the forty-eight-month period.      (c) The person, as a result of mental illness, is unlikely to voluntarily participate in necessary treatment.       (d) In view of the person's treatment history and current behavior, the person is in need of treatment in order to prevent a relapse or deterioration that would be likely to result in substantial risk of serious harm to the person or others.    (e) Represents a substantial risk of physical harm to self or others if allowed to remain at liberty pending examination.    Therefore, it is requested that said person be admitted to the above named facility.    STATEMENT OF BELIEF    Must be filled out by one of the following: a psychiatrist, licensed physician, licensed clinical psychologist, health or ,  or .  (Statement shall include the circumstances under which the individual  was taken into custody and the reason for the person's belief that hospitalization is necessary. The statement shall also include a reference to efforts made to secure the individual's property at his residence if he was taken into custody there. Every reasonable and appropriate effort should be made to take this person into custody in the least conspicuous manner possible.)    Pt with clinical depression and SI, alcoholism, need further psych eval     Bora Lama MD 6/28/2024     _____________________________________________________________   Place of Employment: [facility]    STATEMENT OF OBSERVATION BY PSYCHIATRIST, LICENSED PHYSICIAN, OR LICENSED CLINICAL PSYCHOLOGIST, IF APPLICABLE    Place of Observation (e.g., [facility], general hospital, office, emergency facility)  (If applicable, please complete)    Bora Lama MD 6/28/2024    _____________________________________________________________

## 2024-06-28 NOTE — PROGRESS NOTES
Occupational Therapy  Evaluation    Patient Name: Everton Geiger  MRN: [id number]  Today's Date: 6/28/2024  Time Calculation  Start Time: 1119  Stop Time: 1136  Time Calculation (min): 17 min  2122/2122-A    Assessment  IP OT Assessment  OT Assessment: Patient demonstrates decreased independence with self care, decreased independence with functional transfers, decreased balance, decreased endurance and decreased strength.  Patient will benefit from skilled OT to address deficits.  Patient not safe to discharge to prior level of living.  Anticipate patient will benefit from high intensity therapy post hospital stay.  Prognosis: Good  Barriers to Discharge: None  Evaluation/Treatment Tolerance: Patient tolerated treatment well  Medical Staff Made Aware: Yes  End of Session Communication: Bedside nurse  End of Session Patient Position: Up in chair, Alarm on    Plan:  Treatment Interventions: ADL retraining, Functional transfer training, UE strengthening/ROM, Endurance training, Cognitive reorientation, Patient/family training  OT Frequency: 5 times per week  OT Discharge Recommendations: High intensity level of continued care  Equipment Recommended upon Discharge: Wheeled walker  OT Recommended Transfer Status: Moderate assist  OT - OK to Discharge: Yes (to next level of care when medically cleared by physician)    Subjective     Current Problem:  1. Alcohol withdrawal syndrome, with delirium (Multi)        2. Delirium        3. SOB (shortness of breath)  Transthoracic Echo (TTE) Complete    Transthoracic Echo (TTE) Complete          General:  General  Reason for Referral: impaired self care  Referred By: Dr. Bundy  Past Medical History Relevant to Rehab: ETOH w/d, anxiety, depression  Co-Treatment: PT  Co-Treatment Reason: 2 person assist  Prior to Session Communication: Bedside nurse  Patient Position Received: Up in chair, Alarm on  Preferred Learning Style: verbal, visual  General Comment: Patient agreeable to  therapy.    Precautions:  Medical Precautions: Fall precautions      Pain:  Pain Assessment  Pain Assessment: 0-10  0-10 (Numeric) Pain Score: 0 - No pain    Objective     Cognition:  Orientation Level: Oriented X4        Home Living:  Type of Home: Apartment  Lives With: Alone  Home Layout: One level  Bathroom Shower/Tub: Walk-in shower     Prior Function:  Level of [place]: Independent with ADLs and functional transfers      ADL:  LE Dressing Assistance: Maximal    Activity Tolerance:  Endurance: Tolerates 10 - 20 min exercise with multiple rests    Bed Mobility/Transfers:   Bed Mobility  Bed Mobility: No  Transfers  Transfer: Yes  Transfer 1  Transfer From 1: Sit to  Transfer to 1: Stand  Technique 1: Sit to stand, Stand to sit  Transfer Device 1: Walker, Gait belt  Transfer Level of Assistance 1: Moderate assistance, +2  Transfers 2  Transfer From 2: Chair with arms to  Transfer to 2: Chair with arms  Technique 2: Stand pivot  Transfer Device 2: Walker, Gait belt  Transfer Level of Assistance 2: Minimum assistance, Moderate assistance, +2  Trials/Comments 2: patient noted with unsteadiness and shaky    Ambulation/Gait Training:  Functional Mobility  Functional Mobility Performed: Yes (Ambulated with FWW with mod A)         Extremities: RUE   RUE : Within Functional Limits and LUE   LUE: Within Functional Limits    Outcome Measures: [facility] Daily Activity  Putting on and taking off regular lower body clothing: A lot  Bathing (including washing, rinsing, drying): A lot  Putting on and taking off regular upper body clothing: A lot  Toileting, which includes using toilet, bedpan or urinal: A lot  Taking care of personal grooming such as brushing teeth: A little  Eating Meals: A little  Daily Activity - Total Score: 14           EDUCATION:  Education  Individual(s) Educated: Patient  Education Provided: Fall precautons, Risk and benefits of OT discussed with patient or other  Plan of Care Discussed and Agreed  Upon: yes  Patient Response to Education: Patient/Caregiver Verbalized Understanding of Information      Goals:   Encounter Problems       Encounter Problems (Active)       OT Goals       Patient will complete functional transfers with mod I. (Progressing)       Start:  06/28/24    Expected End:  07/12/24            Patient will complete with toileting with mod I. (Progressing)       Start:  06/28/24    Expected End:  07/12/24            Patient will complete with LE dressing with mod I. (Progressing)       Start:  06/28/24    Expected End:  07/12/24                  ;

## 2024-06-28 NOTE — PROGRESS NOTES
Physical Therapy    Physical Therapy Evaluation    Patient Name: Everton Geiger  MRN: [id number]  Today's Date: 6/28/2024   Time Calculation  Start Time: 1120  Stop Time: 1137  Time Calculation (min): 17 min  3033/3033-A    Assessment/Plan   PT Assessment  PT Assessment Results: Decreased strength, Decreased range of motion, Impaired balance, Decreased mobility, Decreased endurance, Impaired judgement, Decreased safety awareness  Rehab Prognosis: Good  Medical Staff Made Aware: Yes  End of Session Communication: Bedside nurse  Assessment Comment: Pt is a 57 y/o male admitted for alcohol withdrawal syndrome with delirium. Pt presents with poor judgement, decreased strength, endurance and balance. Pt able to tolerate transfers and ambulating short distance in room with assist x2. Pt currently demos significant unsteadiness/instability on feet with episodes of LOB. He is functioning below baseline level of function and will benefit from skilled therapy during stay to improve overall functional mobility and safety awareness. Upon discharge pt will benefit from high intensity therapy for continued improvement in functional mobility.     End of Session Patient Position: Up in chair, Alarm on (call light within reach)  IP OR SWING BED PT PLAN  Inpatient or Swing Bed: Inpatient  PT Plan  Treatment/Interventions: Bed mobility, Transfer training, Gait training, Stair training, Balance training, Neuromuscular re-education, Strengthening, Endurance training, Range of motion, Therapeutic exercise, Therapeutic activity, Home exercise program, Positioning, Postural re-education  PT Plan: Ongoing PT  PT Frequency: 5 times per week  PT Discharge Recommendations: High intensity level of continued care  Equipment Recommended upon Discharge: Wheeled walker  PT Recommended Transfer Status: Assist x1, Assistive device (mod A)  PT - OK to Discharge: Yes (Once medically cleared to next level of care.)    Subjective     Current Problem:  1.  Alcohol withdrawal syndrome, with delirium (Multi)        2. Delirium        3. SOB (shortness of breath)  Transthoracic Echo (TTE) Complete    Transthoracic Echo (TTE) Complete        Patient Active Problem List   Diagnosis    Closed dislocation finger, proximal interphalangeal joint, traumatic    Delayed emergence from anesthesia    Depression    Alcohol withdrawal syndrome, with delirium (Multi)    SOB (shortness of breath)     General Visit Information:  General  Reason for Referral: P/w AMS. Per EMS, patient's brother reported that patient had locked himself in his room over the past few days.  On welfare check, patient was found laying on the bed with bottles of benzos as well as alcohol around him.  There is also Benadryl pills as well.  On arrival, patient was altered mumbling words. Pt admitted for alcohol withdrawal syndrome with delirium  Referred By: Dr. Bundy  Past Medical History Relevant to Rehab: Depression, alcohol abuse, closed dislocation finger, proximal interphalangeal joint, traumatic  Family/Caregiver Present: No  Co-Treatment: OT  Co-Treatment Reason: To maximize pt safety with functional mobility and discharge planning.  Prior to Session Communication: Bedside nurse  Patient Position Received: Up in chair, Alarm on  Preferred Learning Style: verbal, visual  General Comment: Pt agreeable to work with therapy. Pt with no complaints at this time. Pt with concern throughout session about discharging from hospital because he just began a new job. Pt demos significant unsteadiness with mobility this date.    Home Living:  Home Living  Type of Home: Apartment  Lives With: Alone  Home Adaptive Equipment: None  Home Layout: One level  Home Access: Level entry  Bathroom Shower/Tub: Walk-in shower  Bathroom Equipment: None    Prior Level of Function:  Prior Function Per Pt/Caregiver Report  Level of [place]: Independent with ADLs and functional transfers, Independent with homemaking with  ambulation  Receives Help From: Family  ADL Assistance: Independent  Homemaking Assistance: Independent  Ambulatory Assistance: Independent  Vocational: Full time employment ()  Prior Function Comments: (+) drives, pt denies any recent falls    Precautions:  Precautions  Medical Precautions: Fall precautions, Seizure precautions  Precautions Comment: SI precautions    Vital Signs:  Vital Signs  Heart Rate: (!) 115  Heart Rate Source: Monitor  Resp: 19  SpO2: 95 % (RA)  BP: (!) 142/91  Objective     Pain:  Pain Assessment  Pain Assessment: 0-10  0-10 (Numeric) Pain Score: 0 - No pain    Cognition:  Cognition  Overall Cognitive Status: Within Functional Limits  Orientation Level: Oriented X4    General Assessments:  General Observation  General Observation: [place]   Activity Tolerance  Endurance: Tolerates 10 - 20 min exercise with multiple rests  Sensation  Light Touch: No apparent deficits  Sensation Comment: Pt denies any n/t.     Static Sitting Balance  Static Sitting-Balance Support: Bilateral upper extremity supported, Feet supported  Static Sitting-Level of Assistance: Close supervision  Dynamic Sitting Balance  Dynamic Sitting-Balance Support: Bilateral upper extremity supported, Feet supported  Dynamic Sitting-Comments: CGA scooting glutes towards edge of chair  Static Standing Balance  Static Standing-Balance Support: Bilateral upper extremity supported  Static Standing-Level of Assistance: Moderate assistance (x2)  Static Standing-Comment/Number of Minutes: Retro leaning and episode of retro LOB upon standing  Dynamic Standing Balance  Dynamic Standing-Balance Support: Bilateral upper extremity supported  Dynamic Standing-Comments: mod A x2, significnat unsteadiness with retro leaning    Functional Assessments:     Bed Mobility  Bed Mobility: No (Pt received and returned to chair)    Transfers  Transfer: Yes  Transfer 1  Transfer From 1: Chair with arms to, Stand to  Transfer to 1: Stand, Chair with  arms  Technique 1: Sit to stand, Stand to sit  Transfer Device 1: Walker, Gait belt  Transfer Level of Assistance 1: Moderate assistance, +2  Trials/Comments 1: Pt demos retro LOB upon standing. Pt demos difficulty performing standing weight shifting and marches 2/2 unsteadiness.    Ambulation/Gait Training  Ambulation/Gait Training Performed: Yes  Ambulation/Gait Training 1  Surface 1: Level tile  Device 1: Rolling walker  Gait Support Devices: Gait belt  Assistance 1: Moderate assistance, Moderate verbal cues, Loss of balance (Comment) (x2, episode of retro LOB when ambulating backwards)  Quality of Gait 1: Wide base of support, Diminished heel strike, Shuffling gait, Forward flexed posture, Antalgic (decreased boris, significant instability and unsteadiness on feet, multiple episodes of retro LOB)  Comments/Distance (ft) 1: ~10 ft total with chair follow, pt ambulates ~5 ft forwards and ~5 ft backwards; VCs for proper gait mechanics and safety awareness with FWW     Extremity/Trunk Assessments:     RLE   RLE : Within Functional Limits  LLE   LLE : Within Functional Limits    Outcome Measures:     [facility] Basic Mobility  Turning from your back to your side while in a flat bed without using bedrails: A little  Moving from lying on your back to sitting on the side of a flat bed without using bedrails: A little  Moving to and from bed to chair (including a wheelchair): A lot  Standing up from a chair using your arms (e.g. wheelchair or bedside chair): A lot  To walk in hospital room: A lot  Climbing 3-5 steps with railing: Total  Basic Mobility - Total Score: 13     Goals:  Encounter Problems       Encounter Problems (Active)       Balance       Pt will demonstrate static/dynamic standing balance for >/= 5 min CGA without evidence of instability or retro LOB with functional mobility tasks.         Start:  06/28/24    Expected End:  07/12/24               Mobility       Pt will be able to ambulate >/= 50 ft with least  restrictive device CGA with good safety awareness.        Start:  06/28/24    Expected End:  07/12/24            Pt will complete supine, seated and standing exercises to maintain overall strength with minimal verbal cues.         Start:  06/28/24    Expected End:  07/12/24            Pt will be able to negotiate ascending/descending flight of steps with use of unilateral HR SBA with sufficient foot clearance and good safety awareness for safe home going/community purposes.        Start:  06/28/24    Expected End:  07/12/24               PT Transfers       Pt will be able to complete all bed mobility tasks independently from flat HOB.        Start:  06/28/24    Expected End:  07/12/24            Pt will be able to complete all transfers with least restrictive device CGA demonstrating good safety awareness and proper body mechanics.        Start:  06/28/24    Expected End:  07/12/24                 Education Documentation  Precautions, taught by Mayra Meredith PT at 6/28/2024  3:35 PM.  Learner: Patient  Readiness: Acceptance  Method: Explanation  Response: Verbalizes Understanding, Demonstrated Understanding, Needs Reinforcement    Body Mechanics, taught by Mayra Meredith PT at 6/28/2024  3:35 PM.  Learner: Patient  Readiness: Acceptance  Method: Explanation  Response: Verbalizes Understanding, Demonstrated Understanding, Needs Reinforcement    Home Exercise Program, taught by Mayra Meredith PT at 6/28/2024  3:35 PM.  Learner: Patient  Readiness: Acceptance  Method: Explanation  Response: Verbalizes Understanding, Demonstrated Understanding, Needs Reinforcement    Mobility Training, taught by Mayra Meredith PT at 6/28/2024  3:35 PM.  Learner: Patient  Readiness: Acceptance  Method: Explanation  Response: Verbalizes Understanding, Demonstrated Understanding, Needs Reinforcement    Education Comments  No comments found.

## 2024-06-28 NOTE — PROGRESS NOTES
Psychiatrist recommending inpatient psych when medically cleared. Will need consult to EPAT when medically cleared.     1620  Placed call to patient's father per RN request. RN said patient did give permission to speak with his father.  His father stated he wants him to go to an inpatient program at discharge. Advised him that psychiatrist is recommending inpatient psych when medically cleared. He expressed understanding and agrees with this plan. He would like him to go to inpatient rehab after pscyh but is not sure if he will be agreealbe and has not discussed with him. He understands he would have to be agreeable and he will discuss with him. He understands plan will be to go to inpatient psych when medically cleared.   PADMINI Blair

## 2024-06-28 NOTE — CONSULTS
Reason for consult:  Alcoholism    History Of Present Illness  Everton Geiger is a 58 y.o. male presenting with alcohol w/d. Pt is single was living with his brother and sister-in-law.  Patient work as a .  Patient has been chronically depressed and has been drinking alcohol for a number of years.  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  He did not get good grades and so he always struggle to get into a good job.  Since he could not find any in this country he moved to [place] where he worked as an  for 3 years until he lost his job there and then moved back.  He was living with his parents at their basement. patient has been feeling depressed for many years and it has gotten gradually worsened over the last few months.  Recently he did not have any place to live and so his brother agreed to have him in their place.  Patient recently found a job as a  and was drinking after his work about a liter of liquor per day.  The day before admission he drank a large quantity of liquor not sure exactly how much he did.  After getting intoxicated he did not know what he was doing.  He said the bottle of Ativan and Benadryl all from the prescription for his brothers 2 dogs.  He does not remember if he had taken any.  However at the initial evaluation it was reported that patient has been taking Ativan and Seroquel at night for anxiety and depression.  PDMP review did not show any Ativan prescriptions    Patient has been feeling low and depressed with hopeless and worthless feeling and at times feels not having any purpose.  He has had fleeting suicidal thoughts on and off but when asked about the alcohol intoxicated state has wait to commit suicide he is sedated a long time and then acknowledge that it could be very well be.  Moreover patient chose to bring himself to such an intoxicated state when his brother and sister-in-law are not in town.  He  "had clearly planned the whole incident.  He did not rate any suicide note.    Patient reports that he has tried Lexapro Effexor and trazodone.  The last time he took the medication was a week ago and he did not continue for the past 1 week.  Patient has been gradually neglecting himself.    Patient denies using any drugs other than alcohol    No significant past suicidal attempt or psychiatric admission although he struggled with severe depression all throughout his lifetime.     Past Medical History  He has a past medical history of Anxiety, Delayed emergence from general anesthesia, Depression, and Other specified health status.    Surgical History  He has a past surgical history that includes Septoplasty.     Social History  He reports that he has never smoked. He has never used smokeless tobacco. He reports that he does not currently use alcohol. He reports that he does not use drugs.     Allergies  Patient has no known allergies.        Mental Status Exam  Patient is alert and oriented x 3 cooperative decree psychomotor activity.  Speech is decreased in rate and rhythm coherent.  Patient describes his mood to be depressed and anxious and affect is restricted.  He does not have any formal thought disorder.  Patient denies any audiovisual hallucinations or paranoid thoughts.  He has passive suicidal ideation.  Judgment and insight is impaired.    Psychiatric Risk Assessment  High risk of suicide and alcohol relapse    Last Recorded Vitals  Blood pressure (!) 136/101, pulse (!) 112, temperature 36.2 °C (97.2 °F), temperature source Temporal, resp. rate 15, height 1.753 m (5' 9.02\"), weight 76.9 kg (169 lb 8.5 oz), SpO2 94%.    Relevant Results  Results for orders placed or performed during the hospital encounter of 06/26/24 (from the past 96 hour(s))   POCT GLUCOSE   Result Value Ref Range    POCT Glucose 84 74 - 99 mg/dL   CBC and Auto Differential   Result Value Ref Range    WBC 8.8 4.4 - 11.3 x10*3/uL    nRBC " 0.0 0.0 - 0.0 /100 WBCs    RBC 4.67 4.50 - 5.90 x10*6/uL    Hemoglobin 15.1 13.5 - 17.5 g/dL    Hematocrit 44.5 41.0 - 52.0 %    MCV 95 80 - 100 fL    MCH 32.3 26.0 - 34.0 pg    MCHC 33.9 32.0 - 36.0 g/dL    RDW 13.0 11.5 - 14.5 %    Platelets 406 150 - 450 x10*3/uL    Neutrophils % 57.3 40.0 - 80.0 %    Immature Granulocytes %, Automated 0.5 0.0 - 0.9 %    Lymphocytes % 36.0 13.0 - 44.0 %    Monocytes % 5.6 2.0 - 10.0 %    Eosinophils % 0.0 0.0 - 6.0 %    Basophils % 0.6 0.0 - 2.0 %    Neutrophils Absolute 5.03 1.20 - 7.70 x10*3/uL    Immature Granulocytes Absolute, Automated 0.04 0.00 - 0.70 x10*3/uL    Lymphocytes Absolute 3.16 1.20 - 4.80 x10*3/uL    Monocytes Absolute 0.49 0.10 - 1.00 x10*3/uL    Eosinophils Absolute 0.00 0.00 - 0.70 x10*3/uL    Basophils Absolute 0.05 0.00 - 0.10 x10*3/uL   Comprehensive Metabolic Panel   Result Value Ref Range    Glucose 93 74 - 99 mg/dL    Sodium 144 136 - 145 mmol/L    Potassium 3.9 3.5 - 5.3 mmol/L    Chloride 104 98 - 107 mmol/L    Bicarbonate 23 21 - 32 mmol/L    Anion Gap 21 (H) 10 - 20 mmol/L    Urea Nitrogen 8 6 - 23 mg/dL    Creatinine 0.73 0.50 - 1.30 mg/dL    eGFR >90 >60 mL/min/1.73m*2    Calcium 9.3 8.6 - 10.3 mg/dL    Albumin 4.3 3.4 - 5.0 g/dL    Alkaline Phosphatase 70 33 - 120 U/L    Total Protein 7.4 6.4 - 8.2 g/dL    AST 17 9 - 39 U/L    Bilirubin, Total 0.4 0.0 - 1.2 mg/dL    ALT 11 10 - 52 U/L   Acute Toxicology Panel, Blood   Result Value Ref Range    Acetaminophen <10.0 10.0 - 30.0 ug/mL    Salicylate  <3 4 - 20 mg/dL    Alcohol 203 (H) <=10 mg/dL   Ammonia   Result Value Ref Range    Ammonia 14 (L) 16 - 53 umol/L   Magnesium   Result Value Ref Range    Magnesium 1.78 1.60 - 2.40 mg/dL   Creatine Kinase   Result Value Ref Range    Creatine Kinase 57 0 - 325 U/L   Lactate   Result Value Ref Range    Lactate 6.6 (HH) 0.4 - 2.0 mmol/L   Blood Culture    Specimen: Peripheral Venipuncture; Blood culture   Result Value Ref Range    Blood Culture No growth at  1 day    Troponin I, High Sensitivity   Result Value Ref Range    Troponin I, High Sensitivity 18 0 - 20 ng/L   BLOOD GAS VENOUS FULL PANEL   Result Value Ref Range    POCT pH, Venous 7.39 7.33 - 7.43 pH    POCT pCO2, Venous 37 (L) 41 - 51 mm Hg    POCT pO2, Venous 54 (H) 35 - 45 mm Hg    POCT SO2, Venous 84 (H) 45 - 75 %    POCT Oxy Hemoglobin, Venous 82.5 (H) 45.0 - 75.0 %    POCT Hematocrit Calculated, Venous 47.0 41.0 - 52.0 %    POCT Sodium, Venous 141 136 - 145 mmol/L    POCT Potassium, Venous 3.6 3.5 - 5.3 mmol/L    POCT Chloride, Venous 108 (H) 98 - 107 mmol/L    POCT Ionized Calicum, Venous 1.11 1.10 - 1.33 mmol/L    POCT Glucose, Venous 99 74 - 99 mg/dL    POCT Lactate, Venous 6.9 (HH) 0.4 - 2.0 mmol/L    POCT Base Excess, Venous -2.1 (L) -2.0 - 3.0 mmol/L    POCT HCO3 Calculated, Venous 22.4 22.0 - 26.0 mmol/L    POCT Hemoglobin, Venous 15.8 13.5 - 17.5 g/dL    POCT Anion Gap, Venous 14.0 10.0 - 25.0 mmol/L    Patient Temperature      FiO2 21 %    Critical Called By ROCIO     Critical Called To DR CLAROS     Critical Call Time 1441     Critical Read Back Y    Osmolality   Result Value Ref Range    Osmolality, Serum 350 (H) 280 - 300 mOsm/kg   Procalcitonin   Result Value Ref Range    Procalcitonin 0.03 <=0.07 ng/mL   Gamma-Glutamyl Transferase   Result Value Ref Range    GGT 30 5 - 64 U/L   ECG 12 lead   Result Value Ref Range    Ventricular Rate 136 BPM    Atrial Rate 136 BPM    MD Interval 104 ms    QRS Duration 90 ms    QT Interval 376 ms    QTC Calculation(Bazett) 565 ms    R Axis 77 degrees    T Axis 77 degrees    QRS Count 22 beats    Q Onset 220 ms    T Offset 408 ms    QTC Fredericia 493 ms   Blood Culture    Specimen: Peripheral Venipuncture; Blood culture   Result Value Ref Range    Blood Culture No growth at 1 day    Lactate   Result Value Ref Range    Lactate 6.5 (HH) 0.4 - 2.0 mmol/L   Blood Culture    Specimen: Peripheral Venipuncture; Blood culture   Result Value Ref Range    Blood Culture  No growth at 1 day    Blood Culture    Specimen: Peripheral Venipuncture; Blood culture   Result Value Ref Range    Blood Culture No growth at 1 day    SST TOP   Result Value Ref Range    Extra Tube Hold for add-ons.    Gray Top   Result Value Ref Range    Extra Tube Hold for add-ons.    Urinalysis with Reflex Culture and Microscopic   Result Value Ref Range    Color, Urine Light-Yellow Light-Yellow, Yellow, Dark-Yellow    Appearance, Urine Clear Clear    Specific Gravity, Urine 1.012 1.005 - 1.035    pH, Urine 6.5 5.0, 5.5, 6.0, 6.5, 7.0, 7.5, 8.0    Protein, Urine NEGATIVE NEGATIVE, 10 (TRACE), 20 (TRACE) mg/dL    Glucose, Urine Normal Normal mg/dL    Blood, Urine NEGATIVE NEGATIVE    Ketones, Urine 10 (1+) (A) NEGATIVE mg/dL    Bilirubin, Urine NEGATIVE NEGATIVE    Urobilinogen, Urine Normal Normal mg/dL    Nitrite, Urine NEGATIVE NEGATIVE    Leukocyte Esterase, Urine NEGATIVE NEGATIVE   Drug Screen, Urine With Reflex to Confirmation   Result Value Ref Range    Amphetamine Screen, Urine Presumptive Negative Presumptive Negative    Barbiturate Screen, Urine Presumptive Negative Presumptive Negative    Benzodiazepines Screen, Urine Presumptive Negative Presumptive Negative    Cannabinoid Screen, Urine Presumptive Negative Presumptive Negative    Cocaine Metabolite Screen, Urine Presumptive Negative Presumptive Negative    Fentanyl Screen, Urine Presumptive Negative Presumptive Negative    Opiate Screen, Urine Presumptive Negative Presumptive Negative    Oxycodone Screen, Urine Presumptive Negative Presumptive Negative    PCP Screen, Urine Presumptive Negative Presumptive Negative    Methadone Screen, Urine Presumptive Negative Presumptive Negative   Urine culture    Specimen: Clean Catch/Voided; Urine   Result Value Ref Range    Urine Culture No significant growth    Legionella Antigen, Urine    Specimen: Urine   Result Value Ref Range    L. pneumophila Urine Ag Negative Negative   Streptococcus pneumoniae Antigen,  Urine    Specimen: Urine   Result Value Ref Range    Streptococcus pneumoniae Ag, Urine Negative Negative   Lactate   Result Value Ref Range    Lactate 8.5 (HH) 0.4 - 2.0 mmol/L   POCT GLUCOSE   Result Value Ref Range    POCT Glucose 98 74 - 99 mg/dL   POCT GLUCOSE   Result Value Ref Range    POCT Glucose 116 (H) 74 - 99 mg/dL   POCT GLUCOSE   Result Value Ref Range    POCT Glucose 110 (H) 74 - 99 mg/dL   Magnesium   Result Value Ref Range    Magnesium 1.10 (L) 1.60 - 2.40 mg/dL   CBC and Auto Differential   Result Value Ref Range    WBC 10.3 4.4 - 11.3 x10*3/uL    nRBC 0.0 0.0 - 0.0 /100 WBCs    RBC 4.11 (L) 4.50 - 5.90 x10*6/uL    Hemoglobin 13.5 13.5 - 17.5 g/dL    Hematocrit 39.1 (L) 41.0 - 52.0 %    MCV 95 80 - 100 fL    MCH 32.8 26.0 - 34.0 pg    MCHC 34.5 32.0 - 36.0 g/dL    RDW 12.9 11.5 - 14.5 %    Platelets 295 150 - 450 x10*3/uL    Neutrophils % 72.8 40.0 - 80.0 %    Immature Granulocytes %, Automated 0.4 0.0 - 0.9 %    Lymphocytes % 15.3 13.0 - 44.0 %    Monocytes % 11.0 2.0 - 10.0 %    Eosinophils % 0.1 0.0 - 6.0 %    Basophils % 0.4 0.0 - 2.0 %    Neutrophils Absolute 7.53 1.20 - 7.70 x10*3/uL    Immature Granulocytes Absolute, Automated 0.04 0.00 - 0.70 x10*3/uL    Lymphocytes Absolute 1.58 1.20 - 4.80 x10*3/uL    Monocytes Absolute 1.14 (H) 0.10 - 1.00 x10*3/uL    Eosinophils Absolute 0.01 0.00 - 0.70 x10*3/uL    Basophils Absolute 0.04 0.00 - 0.10 x10*3/uL   Comprehensive metabolic panel   Result Value Ref Range    Glucose 115 (H) 74 - 99 mg/dL    Sodium 136 136 - 145 mmol/L    Potassium 3.7 3.5 - 5.3 mmol/L    Chloride 99 98 - 107 mmol/L    Bicarbonate 26 21 - 32 mmol/L    Anion Gap 15 10 - 20 mmol/L    Urea Nitrogen 5 (L) 6 - 23 mg/dL    Creatinine 0.64 0.50 - 1.30 mg/dL    eGFR >90 >60 mL/min/1.73m*2    Calcium 8.3 (L) 8.6 - 10.3 mg/dL    Albumin 3.4 3.4 - 5.0 g/dL    Alkaline Phosphatase 55 33 - 120 U/L    Total Protein 6.3 (L) 6.4 - 8.2 g/dL    AST 16 9 - 39 U/L    Bilirubin, Total 0.9 0.0 -  1.2 mg/dL    ALT 9 (L) 10 - 52 U/L   Phosphorus   Result Value Ref Range    Phosphorus 3.4 2.5 - 4.9 mg/dL   Lactate   Result Value Ref Range    Lactate 3.3 (H) 0.4 - 2.0 mmol/L   Creatine Kinase   Result Value Ref Range    Creatine Kinase 468 (H) 0 - 325 U/L   Lactate   Result Value Ref Range    Lactate 3.0 (H) 0.4 - 2.0 mmol/L   POCT GLUCOSE   Result Value Ref Range    POCT Glucose 119 (H) 74 - 99 mg/dL   B-type natriuretic peptide   Result Value Ref Range     (H) 0 - 99 pg/mL   Blood Gas Arterial Full Panel   Result Value Ref Range    POCT pH, Arterial 7.51 (H) 7.38 - 7.42 pH    POCT pCO2, Arterial 33 (L) 38 - 42 mm Hg    POCT pO2, Arterial 141 (H) 85 - 95 mm Hg    POCT SO2, Arterial 100 94 - 100 %    POCT Oxy Hemoglobin, Arterial 96.8 94.0 - 98.0 %    POCT Hematocrit Calculated, Arterial 43.0 41.0 - 52.0 %    POCT Sodium, Arterial 131 (L) 136 - 145 mmol/L    POCT Potassium, Arterial 3.9 3.5 - 5.3 mmol/L    POCT Chloride, Arterial 99 98 - 107 mmol/L    POCT Ionized Calcium, Arterial 1.15 1.10 - 1.33 mmol/L    POCT Glucose, Arterial 133 (H) 74 - 99 mg/dL    POCT Lactate, Arterial 2.2 (H) 0.4 - 2.0 mmol/L    POCT Base Excess, Arterial 3.7 (H) -2.0 - 3.0 mmol/L    POCT HCO3 Calculated, Arterial 26.3 (H) 22.0 - 26.0 mmol/L    POCT Hemoglobin, Arterial 14.4 13.5 - 17.5 g/dL    POCT Anion Gap, Arterial 10 10 - 25 mmo/L    Patient Temperature      FiO2 50 %    Apparatus VENTI MASK    POCT GLUCOSE   Result Value Ref Range    POCT Glucose 133 (H) 74 - 99 mg/dL   Transthoracic Echo (TTE) Complete   Result Value Ref Range    BSA 1.97 m2   POCT GLUCOSE   Result Value Ref Range    POCT Glucose 112 (H) 74 - 99 mg/dL   Renal function panel   Result Value Ref Range    Glucose 96 74 - 99 mg/dL    Sodium 133 (L) 136 - 145 mmol/L    Potassium 3.5 3.5 - 5.3 mmol/L    Chloride 96 (L) 98 - 107 mmol/L    Bicarbonate 28 21 - 32 mmol/L    Anion Gap 13 10 - 20 mmol/L    Urea Nitrogen 6 6 - 23 mg/dL    Creatinine 0.59 0.50 - 1.30  mg/dL    eGFR >90 >60 mL/min/1.73m*2    Calcium 8.5 (L) 8.6 - 10.3 mg/dL    Phosphorus 2.5 2.5 - 4.9 mg/dL    Albumin 3.5 3.4 - 5.0 g/dL   Magnesium   Result Value Ref Range    Magnesium 2.15 1.60 - 2.40 mg/dL   POCT GLUCOSE   Result Value Ref Range    POCT Glucose 112 (H) 74 - 99 mg/dL   POCT GLUCOSE   Result Value Ref Range    POCT Glucose 118 (H) 74 - 99 mg/dL   Magnesium   Result Value Ref Range    Magnesium 2.06 1.60 - 2.40 mg/dL   CBC and Auto Differential   Result Value Ref Range    WBC 8.2 4.4 - 11.3 x10*3/uL    nRBC 0.0 0.0 - 0.0 /100 WBCs    RBC 4.20 (L) 4.50 - 5.90 x10*6/uL    Hemoglobin 13.9 13.5 - 17.5 g/dL    Hematocrit 40.2 (L) 41.0 - 52.0 %    MCV 96 80 - 100 fL    MCH 33.1 26.0 - 34.0 pg    MCHC 34.6 32.0 - 36.0 g/dL    RDW 12.8 11.5 - 14.5 %    Platelets 248 150 - 450 x10*3/uL    Neutrophils % 61.5 40.0 - 80.0 %    Immature Granulocytes %, Automated 0.4 0.0 - 0.9 %    Lymphocytes % 25.4 13.0 - 44.0 %    Monocytes % 9.5 2.0 - 10.0 %    Eosinophils % 2.7 0.0 - 6.0 %    Basophils % 0.5 0.0 - 2.0 %    Neutrophils Absolute 5.07 1.20 - 7.70 x10*3/uL    Immature Granulocytes Absolute, Automated 0.03 0.00 - 0.70 x10*3/uL    Lymphocytes Absolute 2.09 1.20 - 4.80 x10*3/uL    Monocytes Absolute 0.78 0.10 - 1.00 x10*3/uL    Eosinophils Absolute 0.22 0.00 - 0.70 x10*3/uL    Basophils Absolute 0.04 0.00 - 0.10 x10*3/uL   Comprehensive metabolic panel   Result Value Ref Range    Glucose 107 (H) 74 - 99 mg/dL    Sodium 134 (L) 136 - 145 mmol/L    Potassium 3.7 3.5 - 5.3 mmol/L    Chloride 100 98 - 107 mmol/L    Bicarbonate 26 21 - 32 mmol/L    Anion Gap 12 10 - 20 mmol/L    Urea Nitrogen 7 6 - 23 mg/dL    Creatinine 0.71 0.50 - 1.30 mg/dL    eGFR >90 >60 mL/min/1.73m*2    Calcium 8.6 8.6 - 10.3 mg/dL    Albumin 3.6 3.4 - 5.0 g/dL    Alkaline Phosphatase 57 33 - 120 U/L    Total Protein 6.5 6.4 - 8.2 g/dL    AST 12 9 - 39 U/L    Bilirubin, Total 1.1 0.0 - 1.2 mg/dL    ALT 7 (L) 10 - 52 U/L   Phosphorus   Result  Value Ref Range    Phosphorus 3.0 2.5 - 4.9 mg/dL   Creatine Kinase   Result Value Ref Range    Creatine Kinase 203 0 - 325 U/L   Lactate   Result Value Ref Range    Lactate 1.0 0.4 - 2.0 mmol/L   POCT GLUCOSE   Result Value Ref Range    POCT Glucose 102 (H) 74 - 99 mg/dL     XR chest 1 view    Result Date: 6/27/2024  Interpreted By:  Kenny Carmichael, STUDY: XR CHEST 1 VIEW;  6/27/2024 4:02 pm   INDICATION: Signs/Symptoms:monitor fluid correction.   COMPARISON: 06/27/2024 at 8:21 a.m.   ACCESSION NUMBER(S): [id number]   ORDERING CLINICIAN: NORMA RAMOS   FINDINGS: Right upper lobe opacities have decreased. Pulmonary vascular congestion persists. Pleural effusions not excluded. Cardiomediastinal silhouette unchanged.       Right upper lobe opacities have decreased. Pulmonary vascular congestion otherwise not significantly changed.   MACRO: None   Signed by: Kenny Carmichael 6/27/2024 4:16 PM Dictation workstation:   [id number]    XR chest 1 view    Result Date: 6/27/2024  Interpreted By:  Kenny Carmichael, STUDY: XR CHEST 1 VIEW;  6/27/2024 8:35 am   INDICATION: Signs/Symptoms:new osygen requirment with crackle breath sounds.   COMPARISON: 06/26/2024   ACCESSION NUMBER(S): [id number]   ORDERING CLINICIAN: NORMA RAMOS   FINDINGS: Increasing hazy right upper lobe opacities. Left lung grossly clear. No apparent pleural effusion. Cardiomediastinal silhouette unchanged. Pulmonary vascular congestion.       Increasing right upper lobe edema or infiltrate. Pulmonary vascular congestion.   MACRO: None   Signed by: Kenny Carmichael 6/27/2024 8:58 AM Dictation workstation:   [id number]    XR chest 1 view    Result Date: 6/26/2024  STUDY: Chest Radiograph;  6/26/2024 3:07 PM. INDICATION: Altered mental status. COMPARISON: Chest and rib radiographs 5/9/2024. ACCESSION NUMBER(S): [id number] ORDERING CLINICIAN: YOUSIF CLAROS TECHNIQUE:  Frontal chest was obtained at 15:07 hours. FINDINGS: CARDIOMEDIASTINAL SILHOUETTE:  Cardiomediastinal silhouette is normal in size and configuration.  LUNGS: Patchy left basilar infiltrate or atelectasis and probable small left pleural effusion.  ABDOMEN: No remarkable upper abdominal findings.  BONES: No acute osseous changes. Old left-sided rib fractures again noted.    Patchy left basilar infiltrate or atelectasis and probable small left pleural effusion. Signed by Aldo Dupree MD    ECG 12 lead    Result Date: 6/26/2024  Sinus tachycardia with short LA Nonspecific ST abnormality Abnormal ECG No previous ECGs available    CT cervical spine wo IV contrast    Result Date: 6/26/2024  Interpreted By:  Len Briones, STUDY: CT CERVICAL SPINE WO IV CONTRAST;  6/26/2024 2:46 pm   INDICATION: Fall and injury   COMPARISON: None.   ACCESSION NUMBER(S): [id number]   ORDERING CLINICIAN: YOUSIF CLAROS   TECHNIQUE: Transaxial images with orthogonal reconstructions   FINDINGS: VERTEBRAL ALIGNMENT: Within normal limits.   CRANIOCERVICAL JUNCTION: Unremarkable   BONY STRUCTURES: No fracture or dislocation are evident.   THE CERVICAL LEVELS INCLUDING DISC SPACES, APOPHYSEAL AND UNCOVERTEBRAL JOINTS:  Mild spondylosis. Primarily there is moderate narrowing of the C4-5 disc interspace with moderate anterior marginal osteophyte formation. There is also mild uncovertebral hypertrophy with mild foraminal impingement. There is also moderate narrowing of the C5-6 disc interspace. There is mild multilevel apophyseal hypertrophy..   ADDITIONAL FINDINGS: Mild mucosal thickening of the maxillary air cells indicating mild sinusitis.       Mild spondylosis, otherwise no acute findings.   Signed by: Len Briones 6/26/2024 2:59 PM Dictation workstation:   [id number]    CT brain attack head wo IV contrast    Result Date: 6/26/2024  Interpreted By:  Len Briones, STUDY: CT BRAIN ATTACK HEAD WO IV CONTRAST;  6/26/2024 2:46 pm   INDICATION: Signs/Symptoms:AMS.   COMPARISON: None.   ACCESSION NUMBER(S): [id number]   ORDERING  CLINICIAN: YOUSIF CLAROS   TECHNIQUE: Sequential trans axial images were obtained  .   FINDINGS: INTRACRANIAL:   CORTICAL SULCI AND EXTRA-AXIAL SPACES:  Mild prominence indicating age related atrophy.   VENTRICULAR SYSTEM:  Also mild ventriculomegaly indicating age related atrophy.   CEREBRAL PARENCHYMA:  Unremarkable without significant degenerative change.There is no evidence of definite subacute infarction, intracranial hemorrhage or mass.   EXTRACRANIAL: Visualized paranasal sinuses and mastoids are clear. The calvarium is intact.       Unremarkable exam, without acute findings.   MACRO: Len Briones discussed the significance and urgency of this critical finding by telephone with  YOUSIF CLAROS on 6/26/2024 at 2:56 pm. (**-RCF-**) Findings:  See findings.   Signed by: Len Briones 6/26/2024 2:56 PM Dictation workstation:   [id number]         Assessment/Plan   MDD recurrent severe  Principal Problem:    Alcohol withdrawal syndrome, with delirium (Multi)  Active Problems:    SOB (shortness of breath)      Patient is currently considered to be of high suicidal risk.  Will need admission to inpatient psychiatry unit for further stabilization of his mood.  Continue CIWA protocol as ordered  Patient cannot sign AGAINST MEDICAL ADVICE  We will continue to follow during the stay in the hospital         Bora Lama MD

## 2024-06-28 NOTE — NURSING NOTE
Dr. Lama spoke with patient and reporting patient needs to discharge to inpatient psych. Suicide precautions initiated at this time. Sitter present at bedside

## 2024-06-28 NOTE — SIGNIFICANT EVENT
CHRISTINE Sweet notified of patient bp 143/101 patient denies any symptoms feels comfortable at this time.

## 2024-06-28 NOTE — PROGRESS NOTES
Critical Care Daily Progress        Subjective   Patient is a 58 y.o. male admitted on 6/26/2024  2:22 PM with the following indication(s) for ICU care of Alcohol Withdrawal with CIWA >20. .     Overnight Events: Overnight received 6mg Ativan, CIWAs hit 20s, however is doing much better this morning. Maintained saturations on room air.    Complaints: States he feels better this morning. Is still A&Ox2-3 but mentation and alertness is improving. Denies chest pain, abdominal pain, sob, wheezing, cough, fevers, anxiety, or tremors.    Scheduled Medications:   cefTRIAXone, 2 g, intravenous, q24h  [Held by provider] diazePAM, 10 mg, intravenous, q8h  doxycycline, 100 mg, intravenous, q12h  enoxaparin, 40 mg, subcutaneous, Daily  folic acid, 1 mg, oral, Daily  multivitamin with minerals, 1 tablet, oral, Daily  perflutren lipid microspheres, 0.5-10 mL of dilution, intravenous, Once in imaging  perflutren protein A microsphere, 0.5 mL, intravenous, Once in imaging  QUEtiapine, 25 mg, oral, Nightly  sertraline, 50 mg, oral, Nightly  sulfur hexafluoride microsphr, 2 mL, intravenous, Once in imaging  [START ON 6/29/2024] thiamine, 100 mg, oral, Daily         Continuous Medications:           PRN Medications:   PRN medications: LORazepam **OR** LORazepam **OR** LORazepam, magnesium sulfate, magnesium sulfate, potassium chloride CR **OR** potassium chloride, potassium chloride    Objective   Vitals:  Temp  Min: 36.6 °C (97.9 °F)  Max: 37 °C (98.6 °F)  Pulse  Min: 82  Max: 110  BP  Min: 106/71  Max: 156/94  Resp  Min: 19  Max: 38  SpO2  Min: 93 %  Max: 97 %    Physical Exam:   Physical Exam   Physical Exam  Constitutional: GCS 15, NAD  Head and Face: Atraumatic, normocephalic   Eyes: Normal external exam, EOMI  ENT: moist mucous membranes, normal external inspection of ears and nose. Oropharynx normal.  Cardiovascular: RRR, S1/S2, no murmurs, rubs, or gallops, radial pulses +2  Pulmonary: CTA, no wheezing, rales or rhonchi,  RA  Abdomen: +BS, soft, distended and tender suprapubic region, no guarding rigidity or rebound tenderness, no masses noted  MSK: Negative for edema, No joint swelling, normal movements of all extremities.   Neuro: GCS 15, No focal deficits, normal motor function, normal sensation, follows all commands, no asterixis, no tremors  Skin- No lesions, contusions, or erythema.  Psychiatric: Appropriate mood and behavior         Assessment/Plan   Patient is a 58-year-old male with PMHx of alcohol use disorder, anxiety, depression presented via EMS for wellness visit after being withdrawn for multiple days admitted to ICU for CIWA >20. Overall mentation is improving and patient is becoming a better historian.     Neuro: Delirium Tremens 2/2 EtOH withdrawal syndrome  -History:   EtOh use disorder              EtOH hx: 20-30 years              Beverage of choice: wine (multiple bottles a day recently)              Endorses 5x days of daily wine consumption              Last known drink was 9PM 6/25              Hx of EtOH withdrawal hospitalization x1 (no seizure)              Has had periods of sobriety              No concurrent drug use              Possible SA as inciting stressor             Anxiety/Depression              Takes Seroquel and ativen at night for sleep              Takes Zoloft 50mg  -Home meds: Ativan, Seroquel, zoloft   -A&Ox2-3  -CIWA   -[facility] ICU  -Ativan prn   -Valium 10mg q8 (holding)  -Folic acid, multivitamin  -IV thiamine x3 days (6/26-)  -Psychiatry consulted  -Seizure precautions  -Urine drug screen negative  -Resume home meds  - Did not walk well with PT/OT, needed heavy amount of assistance and had severe balance issuses     Cardiac: AHRF/HTN/elevated BNP concerning for alcohol induced cardiomyopathy versus tachycardia/hypertension induced cardiomyopathy  - History: None  - Goals: [MAP> 65, HR ]  - Home meds: None  - Last echo: N/A  - ECHO read pending  -   - If new onset CHF will  bring cardiology on board  -TSH w/ reflex     Pulmonary: AHRF 2/2 pulmonary edema with fluid overload clinical appearance (resolved), CAP  -History: None  -Home meds: None  Continuous pulse oximetry   O2 PRN to maintain SpO2 > 94%, wean as tolerated  -Imaging: CXR showed Patchy left basilar infiltrate or atelectasis and probable small left pleural effusion.   -Incentive spirometer  - Repeat CXR show slightly worsening pulmonary edema  - 1x 40 lasix yesterday (-3.8 L output)  - Post intervention CXR shows improvement      Gastrointestinal: No active concerns  -History: None  -Home meds: None  - Diet: N.p.o.  - Prophylaxis: Not indicated  - Bowel regimen: miralax  - Last BM:       Renal: Lactic acidosis possibly 2/2 dehydration (resolved), acute urinary retention  - Daily RFP,Mg,Phos  -Monitor I's and O's  -Remove Lange  Net IO Since Admission: 1,322.08 mL [06/28/24 0844]  Results from last 72 hours   Lab Units 06/28/24  0357 06/27/24  1715 06/27/24  0509 06/26/24  1429   BUN mg/dL 7 6 5* 8   CREATININE mg/dL 0.71 0.59 0.64 0.73       - Lactic Acidosis improved with fluids  - Fluids: holding all fluids, s/p 4 L + maintence fluids  - Electrolytes: Replete per protocol, goal K>4 Phos >3 Mg >2    Endocrine: None  -History: None  -Home meds: None  -sliding scale: Mild  Results from last 7 days   Lab Units 06/28/24  0754 06/28/24  0357 06/28/24  0005 06/27/24  1948 06/27/24  1715 06/27/24  1600 06/27/24  1151 06/27/24  0756   POCT GLUCOSE mg/dL 102*  --  118* 112*  --  112* 133* 119*   GLUCOSE mg/dL  --  107*  --   --  96  --   --   --       -BG < 180 goal    Hematology: No active concerns   - Daily CBC, coags    Results from last 7 days   Lab Units 06/28/24  0357 06/27/24  0509 06/26/24  1429   WBC AUTO x10*3/uL 8.2 10.3 8.8   HEMOGLOBIN g/dL 13.9 13.5 15.1   HEMATOCRIT % 40.2* 39.1* 44.5   PLATELETS AUTO x10*3/uL 248 295 406     - DVT Prophylaxis: SCDs    Infectious Disease: SIRS criteria with CAP?  -History: Not having  and fevers, cough  Temp (24hrs), Av.9 °C (98.4 °F), Min:36.6 °C (97.9 °F), Max:37 °C (98.6 °F)     -Tachycardia, lactic acidosis, hypertension with radiographic findings of CAP; however patient does not appear clinically to have sepsis or obvious pna at this time. Will treat PNA with rocephin however not concerned at this point for true sepsis. Will repeat lactate after 2 L of fluids as patient was very dry on exam. Will also send for a procal and urine antigens.   -Pro-Cristino negative, giving supporting evidence with a high NPV that there is unlikely pneumonia.  However given unclear symptoms and source of lactic acidosis we will continue antibiotics.     -Abx: CTX discontinued, Doxy  -Cultures:               Blood Culture ()              Urine Culture (): Negative UA    LDA:  External Urinary Catheter (Active)   Placement Date/Time: 24 1651     Number of days: 0         CODE STATUS: Full Code    Disposition: Will monitor patient through morning as CIWA were 20 early night, if CIWA decreased stable for downgrade to CCU.    ABCDEF Checklist  Analgesia: Spontaneous awakening trial to be pursued if clinically appropriate. RASS goal reviewed  Breathing: Spontaneous breathing trial to be pursued if clinically appropriate. Mechanical power of assisted ventilation reviewed  Choice of analgesia/sedation: Analgesic and sedative agents adjusted per clinical context.   Delirium assessed by CAM, will avoid exacerbating factors  Early mobility and exercise: Physical and occupational therapy engaged  Family: Plan of care, overall trajectory of patient shared with family. Questions elicited and answered as appropriate.       Due to the high probability of life threatening clinical decompensation, the patient required critical care time evaluating and managing this patient.  Critical care time included obtaining a history, examining the patient, ordering and reviewing studies, discussing, developing, and  implementing a management plan, evaluating the patient's response to treatment, and discussion with other care team providers. I saw and evaluated the patient myself.  Critical care time was performed exclusive of billable procedures.      Critical care time: 60mins      Case discussed with attending , Dr. Niharika Skaggs, DO  Internal Medicine, PGY-1

## 2024-06-28 NOTE — CARE PLAN
Problem: Risk for Suicide  Goal: Accepts medications as prescribed/needed this shift  Outcome: Progressing  Goal: Identifies supports this shift  Outcome: Progressing  Goal: Makes needs known through verbalization or behaviors this shift  Outcome: Progressing       The clinical goals for the shift include Patient will maintain CIWA score < 7 this shift with ordered interventions    Over the shift, the patient did make progress toward the following goals.

## 2024-06-28 NOTE — NURSING NOTE
Patient transferred from 2122 to 3033. Report called to Talita. Belongings transferred with patient. Sitter remains at bedside

## 2024-06-28 NOTE — PROGRESS NOTES
"Nutrition Initial Assessment:   Nutrition Assessment    Reason for Assessment: Admission nursing screening    Nutrition Note:  Everton Geiger is a 58 y.o. male presenting 6/26 from home alone with altered mental status last drink was 6/25PM; blood ETOH 203 with + lactic acidosis and tachycardia; r/o PNA. Pt presently on CIWA protocol.     Past Medical History   has a past medical history of Anxiety, Delayed emergence from general anesthesia, Depression, and Other specified health status.  Surgical History   has a past surgical history that includes Septoplasty.       Nutrition History:  Energy Intake: Fair 50-75 %, Poor < 50 %  Food and Nutrient History: Pt from home alone; admits lately only eating 1 meal per day. Sometimes would drink a protein drink  but not regularly.  Vitamin/Herbal Supplement Use: home meds include MVI  Food Allergies/Intolerances:  None  GI Symptoms:  none per pt  Oral Problems: None       Anthropometrics:  Height: 175.3 cm (5' 9.02\")   Weight: 76.9 kg (169 lb 8.5 oz)   BMI (Calculated): 25.02  Admit measured 79.8kg  IBW 72.7kg          Weight History:   5/2024: 78.9kg  8/2023: 93.2kg or 14% <1 yr; per PCP note 8/2023 weight loss with proper nutrition and exercise encouraged then.  3/2022: 87.1kg  Pt reports weight 3-4 yrs ago of 90.9kg \"wasn't trying then to lose weight but I lost it and haven't gained it back.   Weight Change %: not significant losses       Nutrition Focused Physical Exam Findings:  defer: per pt request  Subcutaneous Fat Loss:      Muscle Wasting:     Edema:  Edema: none  Physical Findings:  Hair: Negative  Mouth: Negative  Skin: Negative    Nutrition Significant Labs:  BMP Trend:   Results from last 7 days   Lab Units 06/28/24  0357 06/27/24  1715 06/27/24  0509 06/26/24  1429   GLUCOSE mg/dL 107* 96 115* 93   CALCIUM mg/dL 8.6 8.5* 8.3* 9.3   SODIUM mmol/L 134* 133* 136 144   POTASSIUM mmol/L 3.7 3.5 3.7 3.9   CO2 mmol/L 26 28 26 23   CHLORIDE mmol/L 100 96* 99 104   BUN " "mg/dL 7 6 5* 8   CREATININE mg/dL 0.71 0.59 0.64 0.73    , A1C:No results found for: \"HGBA1C\", BG POCT trend:   Results from last 7 days   Lab Units 06/28/24  0754 06/28/24  0005 06/27/24  1948 06/27/24  1600 06/27/24  1151   POCT GLUCOSE mg/dL 102* 118* 112* 112* 133*    , Liver Function Trend:   Results from last 7 days   Lab Units 06/28/24  0357 06/27/24  0509 06/26/24  1429   ALK PHOS U/L 57 55 70   AST U/L 12 16 17   ALT U/L 7* 9* 11   BILIRUBIN TOTAL mg/dL 1.1 0.9 0.4    , Renal Lab Trend:   Results from last 7 days   Lab Units 06/28/24  0357 06/27/24  1715 06/27/24  0509 06/26/24  1429 06/26/24  1429   POTASSIUM mmol/L 3.7 3.5 3.7  --  3.9   PHOSPHORUS mg/dL 3.0 2.5 3.4   < >  --    SODIUM mmol/L 134* 133* 136  --  144   MAGNESIUM mg/dL 2.06 2.15 1.10*  --  1.78   EGFR mL/min/1.73m*2 >90 >90 >90  --  >90   BUN mg/dL 7 6 5*  --  8   CREATININE mg/dL 0.71 0.59 0.64  --  0.73    < > = values in this interval not displayed.    , Lipid Panel: No results found for: \"CHOL\", \"HDL\", \"CHHDL\", \"LDLF\", \"VLDL\", \"TRIG\" , Vit D: No results found for: \"VITD25\" , Vit B12: No results found for: \"UUARBCMO02\" , Folate: No results found for: \"FOLATE\" , CF Vitamin Labs: No results found for: \"VITD25\", \"VITAMINA\", \"VITEALPHA\"     Nutrition Specific Medications:  Scheduled medications  doxycylcine, 100 mg, oral, q12h ALINE  enoxaparin, 40 mg, subcutaneous, Daily  folic acid, 1 mg, oral, Daily  multivitamin with minerals, 1 tablet, oral, Daily  perflutren lipid microspheres, 0.5-10 mL of dilution, intravenous, Once in imaging  perflutren protein A microsphere, 0.5 mL, intravenous, Once in imaging  QUEtiapine, 25 mg, oral, Nightly  sertraline, 50 mg, oral, Nightly  sulfur hexafluoride microsphr, 2 mL, intravenous, Once in imaging  [START ON 6/29/2024] thiamine, 100 mg, oral, Daily      Continuous medications     PRN medications  PRN medications: LORazepam **OR** LORazepam **OR** LORazepam, magnesium sulfate, magnesium sulfate, potassium " chloride CR **OR** potassium chloride, potassium chloride     I/O:    ;      Dietary Orders (From admission, onward)       Start     Ordered    06/28/24 0823  Adult diet Regular  Diet effective now        Question:  Diet type  Answer:  Regular    06/28/24 0822                     Estimated Needs:   Total Energy Estimated Needs (kCal):  (2000-2300kcal (25-29kcal/kg of 79.8kg))     Total Protein Estimated Needs (g):  (87-110g (1.2-1.5g/kg of IBW 72.7kg))     Total Fluid Estimated Needs (mL):  (1mL/kcal/d or as per physician)           Nutrition Diagnosis   Malnutrition Diagnosis  Patient has Malnutrition Diagnosis: No (insufficient information as unable to complete NFPE.)    Nutrition Diagnosis  Patient has Nutrition Diagnosis: Yes  Diagnosis Status (1): New  Nutrition Diagnosis 1: Increased nutrient needs  Related to (1): acute on chronic nutritional stress  As Evidenced by (1): r/o infection and inadequate oral intakes of nutrient dense foods PTA with dialy ETOH use  Additional Assessment Information (1): 6/28: Case discussed with nurse; pt ate 75% of breakfast and kept juice.       Nutrition Interventions/Recommendations         Nutrition Prescription:  Individualized Nutrition Prescription Provided for : continue regular diet as tolerated.        Nutrition Interventions:   Interventions: Medical food supplement  Meals and Snacks: General healthful diet  Goal: continue regular diet; pt aware can order small frequent meals/snacks and aware of floor stock pantry.  Medical Food Supplement: Commercial beverage  Goal: pt currently declines ONS    Collaboration and Referral of Nutrition Care: Collaboration by nutrition professional with other providers  Goal: TISH Arevalo    Nutrition Education:   6/28: Met with pt at bedside; AYR reviewed--aware can order own meals and snacks as well as aware of floor stock pantry.  Pt currently declines scheduled snacks and/or supplements; denies further needs at present time.        Nutrition Monitoring and Evaluation   Food/Nutrient Related History Monitoring  Monitoring and Evaluation Plan: Amount of food  Amount of Food: Estimated amout of food  Criteria: >75% of meals.    Body Composition/Growth/Weight History  Monitoring and Evaluation Plan: Weight  Weight: Measured weight  Criteria: daily weight    Biochemical Data, Medical Tests and Procedures  Monitoring and Evaluation Plan: Electrolyte/renal panel  Electrolyte and Renal Panel: BUN, Creatinine, Magnesium, Phosphorus, Potassium, Sodium  Criteria: labs WNR              Time Spent (min): 60 minutes

## 2024-06-29 ENCOUNTER — APPOINTMENT (OUTPATIENT)
Dept: CARDIOLOGY | Facility: HOSPITAL | Age: 59
End: 2024-06-29
Payer: MEDICAID

## 2024-06-29 LAB
ALBUMIN SERPL BCP-MCNC: 3.5 G/DL (ref 3.4–5)
ALP SERPL-CCNC: 53 U/L (ref 33–120)
ALT SERPL W P-5'-P-CCNC: 6 U/L (ref 10–52)
ANION GAP SERPL CALC-SCNC: 11 MMOL/L (ref 10–20)
ANION GAP SERPL CALC-SCNC: 13 MMOL/L (ref 10–20)
AST SERPL W P-5'-P-CCNC: 11 U/L (ref 9–39)
BASOPHILS # BLD AUTO: 0.05 X10*3/UL (ref 0–0.1)
BASOPHILS NFR BLD AUTO: 0.9 %
BILIRUB SERPL-MCNC: 0.7 MG/DL (ref 0–1.2)
BUN SERPL-MCNC: 15 MG/DL (ref 6–23)
BUN SERPL-MCNC: 16 MG/DL (ref 6–23)
CALCIUM SERPL-MCNC: 9.3 MG/DL (ref 8.6–10.3)
CALCIUM SERPL-MCNC: 9.6 MG/DL (ref 8.6–10.3)
CHLORIDE SERPL-SCNC: 105 MMOL/L (ref 98–107)
CHLORIDE SERPL-SCNC: 105 MMOL/L (ref 98–107)
CO2 SERPL-SCNC: 23 MMOL/L (ref 21–32)
CO2 SERPL-SCNC: 24 MMOL/L (ref 21–32)
CREAT SERPL-MCNC: 0.61 MG/DL (ref 0.5–1.3)
CREAT SERPL-MCNC: 0.65 MG/DL (ref 0.5–1.3)
EGFRCR SERPLBLD CKD-EPI 2021: >90 ML/MIN/1.73M*2
EGFRCR SERPLBLD CKD-EPI 2021: >90 ML/MIN/1.73M*2
EOSINOPHIL # BLD AUTO: 0.29 X10*3/UL (ref 0–0.7)
EOSINOPHIL NFR BLD AUTO: 5.4 %
ERYTHROCYTE [DISTWIDTH] IN BLOOD BY AUTOMATED COUNT: 13.2 % (ref 11.5–14.5)
GLUCOSE SERPL-MCNC: 109 MG/DL (ref 74–99)
GLUCOSE SERPL-MCNC: 85 MG/DL (ref 74–99)
HCT VFR BLD AUTO: 42.1 % (ref 41–52)
HGB BLD-MCNC: 13.9 G/DL (ref 13.5–17.5)
IMM GRANULOCYTES # BLD AUTO: 0.05 X10*3/UL (ref 0–0.7)
IMM GRANULOCYTES NFR BLD AUTO: 0.9 % (ref 0–0.9)
LYMPHOCYTES # BLD AUTO: 1.89 X10*3/UL (ref 1.2–4.8)
LYMPHOCYTES NFR BLD AUTO: 35.1 %
MAGNESIUM SERPL-MCNC: 1.71 MG/DL (ref 1.6–2.4)
MAGNESIUM SERPL-MCNC: 1.79 MG/DL (ref 1.6–2.4)
MCH RBC QN AUTO: 32.6 PG (ref 26–34)
MCHC RBC AUTO-ENTMCNC: 33 G/DL (ref 32–36)
MCV RBC AUTO: 99 FL (ref 80–100)
MONOCYTES # BLD AUTO: 0.55 X10*3/UL (ref 0.1–1)
MONOCYTES NFR BLD AUTO: 10.2 %
NEUTROPHILS # BLD AUTO: 2.56 X10*3/UL (ref 1.2–7.7)
NEUTROPHILS NFR BLD AUTO: 47.5 %
NRBC BLD-RTO: 0 /100 WBCS (ref 0–0)
PHOSPHATE SERPL-MCNC: 2.9 MG/DL (ref 2.5–4.9)
PLATELET # BLD AUTO: 255 X10*3/UL (ref 150–450)
POTASSIUM SERPL-SCNC: 3.9 MMOL/L (ref 3.5–5.3)
POTASSIUM SERPL-SCNC: 4 MMOL/L (ref 3.5–5.3)
PROT SERPL-MCNC: 6.6 G/DL (ref 6.4–8.2)
RBC # BLD AUTO: 4.26 X10*6/UL (ref 4.5–5.9)
SODIUM SERPL-SCNC: 136 MMOL/L (ref 136–145)
SODIUM SERPL-SCNC: 137 MMOL/L (ref 136–145)
WBC # BLD AUTO: 5.4 X10*3/UL (ref 4.4–11.3)

## 2024-06-29 PROCEDURE — 93005 ELECTROCARDIOGRAM TRACING: CPT

## 2024-06-29 PROCEDURE — 2500000001 HC RX 250 WO HCPCS SELF ADMINISTERED DRUGS (ALT 637 FOR MEDICARE OP): Performed by: INTERNAL MEDICINE

## 2024-06-29 PROCEDURE — 2500000002 HC RX 250 W HCPCS SELF ADMINISTERED DRUGS (ALT 637 FOR MEDICARE OP, ALT 636 FOR OP/ED): Performed by: NURSE PRACTITIONER

## 2024-06-29 PROCEDURE — 36415 COLL VENOUS BLD VENIPUNCTURE: CPT | Performed by: NURSE PRACTITIONER

## 2024-06-29 PROCEDURE — 2500000004 HC RX 250 GENERAL PHARMACY W/ HCPCS (ALT 636 FOR OP/ED): Performed by: NURSE PRACTITIONER

## 2024-06-29 PROCEDURE — 2500000002 HC RX 250 W HCPCS SELF ADMINISTERED DRUGS (ALT 637 FOR MEDICARE OP, ALT 636 FOR OP/ED): Performed by: PHYSICIAN ASSISTANT

## 2024-06-29 PROCEDURE — 80053 COMPREHEN METABOLIC PANEL: CPT | Performed by: PHYSICIAN ASSISTANT

## 2024-06-29 PROCEDURE — 2500000001 HC RX 250 WO HCPCS SELF ADMINISTERED DRUGS (ALT 637 FOR MEDICARE OP): Performed by: PHYSICIAN ASSISTANT

## 2024-06-29 PROCEDURE — 1100000001 HC PRIVATE ROOM DAILY

## 2024-06-29 PROCEDURE — 83735 ASSAY OF MAGNESIUM: CPT | Performed by: PHYSICIAN ASSISTANT

## 2024-06-29 PROCEDURE — 36415 COLL VENOUS BLD VENIPUNCTURE: CPT | Performed by: PHYSICIAN ASSISTANT

## 2024-06-29 PROCEDURE — 83735 ASSAY OF MAGNESIUM: CPT | Performed by: NURSE PRACTITIONER

## 2024-06-29 PROCEDURE — 2500000004 HC RX 250 GENERAL PHARMACY W/ HCPCS (ALT 636 FOR OP/ED): Performed by: PHYSICIAN ASSISTANT

## 2024-06-29 PROCEDURE — 99222 1ST HOSP IP/OBS MODERATE 55: CPT | Performed by: INTERNAL MEDICINE

## 2024-06-29 PROCEDURE — 80048 BASIC METABOLIC PNL TOTAL CA: CPT | Mod: CCI | Performed by: NURSE PRACTITIONER

## 2024-06-29 PROCEDURE — 85025 COMPLETE CBC W/AUTO DIFF WBC: CPT | Performed by: PHYSICIAN ASSISTANT

## 2024-06-29 PROCEDURE — 93010 ELECTROCARDIOGRAM REPORT: CPT | Performed by: INTERNAL MEDICINE

## 2024-06-29 PROCEDURE — 84100 ASSAY OF PHOSPHORUS: CPT | Performed by: PHYSICIAN ASSISTANT

## 2024-06-29 RX ORDER — ONDANSETRON 4 MG/1
4 TABLET, ORALLY DISINTEGRATING ORAL EVERY 8 HOURS PRN
Status: ACTIVE | OUTPATIENT
Start: 2024-06-29

## 2024-06-29 RX ORDER — METOPROLOL SUCCINATE 25 MG/1
25 TABLET, EXTENDED RELEASE ORAL DAILY
Status: DISPENSED | OUTPATIENT
Start: 2024-06-29

## 2024-06-29 RX ORDER — ONDANSETRON HYDROCHLORIDE 2 MG/ML
4 INJECTION, SOLUTION INTRAVENOUS EVERY 8 HOURS PRN
Status: ACTIVE | OUTPATIENT
Start: 2024-06-29

## 2024-06-29 RX ORDER — LANOLIN ALCOHOL/MO/W.PET/CERES
400 CREAM (GRAM) TOPICAL ONCE
Status: COMPLETED | OUTPATIENT
Start: 2024-06-29 | End: 2024-06-29

## 2024-06-29 RX ORDER — POTASSIUM CHLORIDE 20 MEQ/1
20 TABLET, EXTENDED RELEASE ORAL ONCE
Status: COMPLETED | OUTPATIENT
Start: 2024-06-29 | End: 2024-06-29

## 2024-06-29 ASSESSMENT — LIFESTYLE VARIABLES
PAROXYSMAL SWEATS: NO SWEAT VISIBLE
TREMOR: NOT VISIBLE, BUT CAN BE FELT FINGERTIP TO FINGERTIP
VISUAL DISTURBANCES: NOT PRESENT
HEADACHE, FULLNESS IN HEAD: NOT PRESENT
ANXIETY: NO ANXIETY, AT EASE
TOTAL SCORE: 1
HEADACHE, FULLNESS IN HEAD: NOT PRESENT
BLOOD PRESSURE: 130/86
TREMOR: NO TREMOR
HEADACHE, FULLNESS IN HEAD: NOT PRESENT
PAROXYSMAL SWEATS: NO SWEAT VISIBLE
VISUAL DISTURBANCES: NOT PRESENT
AGITATION: NORMAL ACTIVITY
AUDITORY DISTURBANCES: NOT PRESENT
PAROXYSMAL SWEATS: NO SWEAT VISIBLE
ANXIETY: NO ANXIETY, AT EASE
VISUAL DISTURBANCES: NOT PRESENT
TOTAL SCORE: 0
VISUAL DISTURBANCES: NOT PRESENT
ANXIETY: NO ANXIETY, AT EASE
ORIENTATION AND CLOUDING OF SENSORIUM: ORIENTED AND CAN DO SERIAL ADDITIONS
ANXIETY: NO ANXIETY, AT EASE
AGITATION: NORMAL ACTIVITY
AGITATION: NORMAL ACTIVITY
ORIENTATION AND CLOUDING OF SENSORIUM: ORIENTED AND CAN DO SERIAL ADDITIONS
AGITATION: NORMAL ACTIVITY
VISUAL DISTURBANCES: NOT PRESENT
ORIENTATION AND CLOUDING OF SENSORIUM: ORIENTED AND CAN DO SERIAL ADDITIONS
TOTAL SCORE: 1
NAUSEA AND VOMITING: NO NAUSEA AND NO VOMITING
AUDITORY DISTURBANCES: NOT PRESENT
ORIENTATION AND CLOUDING OF SENSORIUM: ORIENTED AND CAN DO SERIAL ADDITIONS
TOTAL SCORE: 4
NAUSEA AND VOMITING: NO NAUSEA AND NO VOMITING
NAUSEA AND VOMITING: NO NAUSEA AND NO VOMITING
TREMOR: NO TREMOR
PAROXYSMAL SWEATS: BARELY PERCEPTIBLE SWEATING, PALMS MOIST
ORIENTATION AND CLOUDING OF SENSORIUM: ORIENTED AND CAN DO SERIAL ADDITIONS
HEADACHE, FULLNESS IN HEAD: NOT PRESENT
HEADACHE, FULLNESS IN HEAD: NOT PRESENT
NAUSEA AND VOMITING: NO NAUSEA AND NO VOMITING
AGITATION: NORMAL ACTIVITY
VISUAL DISTURBANCES: NOT PRESENT
NAUSEA AND VOMITING: NO NAUSEA AND NO VOMITING
AGITATION: NORMAL ACTIVITY
TREMOR: NO TREMOR
VISUAL DISTURBANCES: NOT PRESENT
HEADACHE, FULLNESS IN HEAD: NOT PRESENT
PAROXYSMAL SWEATS: 2
ORIENTATION AND CLOUDING OF SENSORIUM: ORIENTED AND CAN DO SERIAL ADDITIONS
AUDITORY DISTURBANCES: NOT PRESENT
PAROXYSMAL SWEATS: BARELY PERCEPTIBLE SWEATING, PALMS MOIST
NAUSEA AND VOMITING: NO NAUSEA AND NO VOMITING
TOTAL SCORE: 2
ANXIETY: NO ANXIETY, AT EASE
AUDITORY DISTURBANCES: NOT PRESENT
HEADACHE, FULLNESS IN HEAD: NOT PRESENT
ANXIETY: NO ANXIETY, AT EASE
PAROXYSMAL SWEATS: BARELY PERCEPTIBLE SWEATING, PALMS MOIST
TREMOR: 2
TREMOR: NO TREMOR
AUDITORY DISTURBANCES: NOT PRESENT
AGITATION: NORMAL ACTIVITY
PULSE: 97
TOTAL SCORE: 0
AUDITORY DISTURBANCES: NOT PRESENT
AUDITORY DISTURBANCES: NOT PRESENT
TOTAL SCORE: 0
TREMOR: NO TREMOR
ORIENTATION AND CLOUDING OF SENSORIUM: ORIENTED AND CAN DO SERIAL ADDITIONS
NAUSEA AND VOMITING: NO NAUSEA AND NO VOMITING
ANXIETY: NO ANXIETY, AT EASE

## 2024-06-29 ASSESSMENT — COLUMBIA-SUICIDE SEVERITY RATING SCALE - C-SSRS
5. HAVE YOU STARTED TO WORK OUT OR WORKED OUT THE DETAILS OF HOW TO KILL YOURSELF? DO YOU INTEND TO CARRY OUT THIS PLAN?: NO
2. HAVE YOU ACTUALLY HAD ANY THOUGHTS OF KILLING YOURSELF?: NO
2. HAVE YOU ACTUALLY HAD ANY THOUGHTS OF KILLING YOURSELF?: NO
5. HAVE YOU STARTED TO WORK OUT OR WORKED OUT THE DETAILS OF HOW TO KILL YOURSELF? DO YOU INTEND TO CARRY OUT THIS PLAN?: NO
2. HAVE YOU ACTUALLY HAD ANY THOUGHTS OF KILLING YOURSELF?: NO
6. HAVE YOU EVER DONE ANYTHING, STARTED TO DO ANYTHING, OR PREPARED TO DO ANYTHING TO END YOUR LIFE?: NO
1. SINCE LAST CONTACT, HAVE YOU WISHED YOU WERE DEAD OR WISHED YOU COULD GO TO SLEEP AND NOT WAKE UP?: NO
1. SINCE LAST CONTACT, HAVE YOU WISHED YOU WERE DEAD OR WISHED YOU COULD GO TO SLEEP AND NOT WAKE UP?: NO
6. HAVE YOU EVER DONE ANYTHING, STARTED TO DO ANYTHING, OR PREPARED TO DO ANYTHING TO END YOUR LIFE?: NO
5. HAVE YOU STARTED TO WORK OUT OR WORKED OUT THE DETAILS OF HOW TO KILL YOURSELF? DO YOU INTEND TO CARRY OUT THIS PLAN?: NO
6. HAVE YOU EVER DONE ANYTHING, STARTED TO DO ANYTHING, OR PREPARED TO DO ANYTHING TO END YOUR LIFE?: NO
6. HAVE YOU EVER DONE ANYTHING, STARTED TO DO ANYTHING, OR PREPARED TO DO ANYTHING TO END YOUR LIFE?: NO
2. HAVE YOU ACTUALLY HAD ANY THOUGHTS OF KILLING YOURSELF?: NO
5. HAVE YOU STARTED TO WORK OUT OR WORKED OUT THE DETAILS OF HOW TO KILL YOURSELF? DO YOU INTEND TO CARRY OUT THIS PLAN?: NO
1. SINCE LAST CONTACT, HAVE YOU WISHED YOU WERE DEAD OR WISHED YOU COULD GO TO SLEEP AND NOT WAKE UP?: NO
1. SINCE LAST CONTACT, HAVE YOU WISHED YOU WERE DEAD OR WISHED YOU COULD GO TO SLEEP AND NOT WAKE UP?: NO

## 2024-06-29 ASSESSMENT — COGNITIVE AND FUNCTIONAL STATUS - GENERAL
MOVING TO AND FROM BED TO CHAIR: A LITTLE
HELP NEEDED FOR BATHING: A LOT
MOVING TO AND FROM BED TO CHAIR: A LOT
STANDING UP FROM CHAIR USING ARMS: A LOT
EATING MEALS: A LITTLE
DRESSING REGULAR LOWER BODY CLOTHING: A LOT
WALKING IN HOSPITAL ROOM: A LOT
DRESSING REGULAR UPPER BODY CLOTHING: A LOT
HELP NEEDED FOR BATHING: A LOT
TOILETING: A LOT
MOVING FROM LYING ON BACK TO SITTING ON SIDE OF FLAT BED WITH BEDRAILS: A LITTLE
DRESSING REGULAR UPPER BODY CLOTHING: A LOT
EATING MEALS: A LITTLE
MOBILITY SCORE: 17
WALKING IN HOSPITAL ROOM: A LITTLE
PERSONAL GROOMING: A LITTLE
MOBILITY SCORE: 13
PERSONAL GROOMING: A LITTLE
TOILETING: A LOT
DAILY ACTIVITIY SCORE: 14
TURNING FROM BACK TO SIDE WHILE IN FLAT BAD: A LITTLE
MOVING FROM LYING ON BACK TO SITTING ON SIDE OF FLAT BED WITH BEDRAILS: A LITTLE
CLIMB 3 TO 5 STEPS WITH RAILING: TOTAL
TURNING FROM BACK TO SIDE WHILE IN FLAT BAD: A LITTLE
CLIMB 3 TO 5 STEPS WITH RAILING: A LOT
STANDING UP FROM CHAIR USING ARMS: A LITTLE
DRESSING REGULAR LOWER BODY CLOTHING: A LOT
DAILY ACTIVITIY SCORE: 14

## 2024-06-29 ASSESSMENT — ENCOUNTER SYMPTOMS
BRUISES/BLEEDS EASILY: 1
RESPIRATORY NEGATIVE: 1
NERVOUS/ANXIOUS: 1
CHILLS: 0
FATIGUE: 1
CARDIOVASCULAR NEGATIVE: 1
NAUSEA: 1
DYSPHORIC MOOD: 1
EYES NEGATIVE: 1
ARTHRALGIAS: 1
FEVER: 0

## 2024-06-29 ASSESSMENT — PAIN SCALES - GENERAL
PAINLEVEL_OUTOF10: 0 - NO PAIN
PAINLEVEL_OUTOF10: 0 - NO PAIN

## 2024-06-29 NOTE — CONSULTS
Consults    Cardiology Consult Note      Date:   6/29/2024  Patient name:  Everton Geiger  Date of admission:  6/26/2024  2:22 PM  MRN:   [id number]  YOB: 1965  Time of Consult:  2:05 PM    Consulting Cardiologist: Dr. Juanita Alberto        Primary Cardiologist:   none     Referring Provider: Dr. Tapia      Admission Diagnosis:     Alcohol withdrawal syndrome, with delirium (Multi)    History of Present Illness:      Everton Geiger is a 58 y.o.  male patient who is being at the request of Dr. Tapia for inpatient consultation of CHF. He was admitted on 6/26/2024.  Previous NO and EHR records have been reviewed in detail.      CC patient in for evaluation due to sinus tachycardia and elevated BN peptide.  Patient presented with altered mental status this and alcohol intoxication and then withdrawal.  The patient denies any chest discomfort, shortness of breath, palpitations, dizziness or lightheadedness.  He has no prior history of congestive heart failure or myocardial infarction.  He has had problems intermittently with hypertension and has had low-dose beta-blockers both started and stopped at various times over the past 5 to 6 years.  He does have a family history of premature coronary artery disease with his brother having bypass surgery at the age of 54.  There is no first-degree family members with congestive heart failure.    For some reason patient had significant volume resuscitation when he was first admitted to the hospital.  He was then noted to have increased vascularity and a chest x-ray and elevated BN peptide and he was diuresed and his weight dropped by 9 kg.  At this time he has absolutely no shortness of breath and is laying flat in bed without any difficulty.    Past medical history is significant for alcohol and substance abuse, anxiety and depression, and labile blood pressure.  Family history significant for hypertension and his brother with coronary artery  "disease.    Allergies:     No Known Allergies    Past Medical History:     Past Medical History:   Diagnosis Date    Anxiety     Delayed emergence from general anesthesia     Depression     Other specified health status     Known health problems: none       Past Surgical History:     Past Surgical History:   Procedure Laterality Date    SEPTOPLASTY         Family History:     No family history on file.    Social History:     Social History     Socioeconomic History    Marital status: Single     Spouse name: None    Number of children: None    Years of education: None    Highest education level: None   Occupational History    None   Tobacco Use    Smoking status: Never    Smokeless tobacco: Never    Tobacco comments:     Pt denies any illness in past 30 days     Denies SOB with stairs or activity     Denies any family reaction or problems with anesthesia. Pt had one prior surgery and states \"Very hard to wake up. They were surprised at how long it took\".     Ambulates freely   Vaping Use    Vaping status: Never Used   Substance and Sexual Activity    Alcohol use: Not Currently     Comment: Detox from alcohol x 1 week several years ago    Drug use: Never    Sexual activity: Defer   Other Topics Concern    None   Social History Narrative    None     Social Determinants of Health     Financial Resource Strain: Patient Declined (6/26/2024)    Overall Financial Resource Strain (CARDIA)     Difficulty of Paying Living Expenses: Patient declined   Recent Concern: Financial Resource Strain - At Risk (5/30/2024)    Received from [facility]     Financial Resource Strain     Financial Resource Strain: 2   Food Insecurity: Patient Declined (6/26/2024)    Hunger Vital Sign     Worried About Running Out of Food in the Last Year: Patient declined     Ran Out of Food in the Last Year: Patient declined   Recent Concern: Food Insecurity - At Risk (5/30/2024)    Received from [facility]     Food Insecurity     Food: 2   Transportation Needs: " Patient Declined (2024)    PRAPARE - Transportation     Lack of Transportation (Medical): Patient declined     Lack of Transportation (Non-Medical): Patient declined   Recent Concern: Transportation Needs - At Risk (2024)    Received from [facility]     Transportation Needs     Transportation: 2   Physical Activity: At Risk (2024)    Received from [facility]     Physical Activity     Physical Activity: 2   Stress: Patient Declined (2024)    [employer] - Occupational Stress Questionnaire     Feeling of Stress : Patient declined   Recent Concern: Stress - At Risk (2024)    Received from [facility]     Stress     Stress: 2   Social Connections: Patient Declined (2024)    Social Connection and Isolation Panel [NHANES]     Frequency of Communication with Friends and Family: Patient declined     Frequency of Social Gatherings with Friends and Family: Patient declined     Attends Congregation Services: Patient declined     Active Member of Clubs or Organizations: Patient declined     Attends Club or Organization Meetings: Patient declined     Marital Status: Patient declined   Recent Concern: Social Connections - At Risk (2024)    Received from [facility]     Social Connections     Social Connections and Isolation: 2   Intimate Partner Violence: Patient Declined (2024)    Humiliation, Afraid, Rape, and Kick questionnaire     Fear of Current or Ex-Partner: Patient declined     Emotionally Abused: Patient declined     Physically Abused: Patient declined     Sexually Abused: Patient declined   Housing Stability: Patient Declined (2024)    Housing Stability Vital Sign     Unable to Pay for Housing in the Last Year: Patient declined     Number of Places Lived in the Last Year: 1     Unstable Housing in the Last Year: Patient declined   Recent Concern: Housing Stability - At Risk (2024)    Received from [facility]     Housing Stability     Housin       Home Medications:      Prior to Admission medications    Medication Sig Start Date End Date Taking? Authorizing Provider   multivitamin with minerals tablet Take 1 tablet by mouth once daily.    Historical Provider, MD   QUEtiapine (SEROquel) 25 mg tablet Take 1 tablet (25 mg) by mouth once daily at bedtime.    Historical Provider, MD   sertraline (Zoloft) 50 mg tablet Take 1 tablet (50 mg) by mouth once daily.    Historical Provider, MD       Current Medications:     Current Outpatient Medications   Medication Instructions    multivitamin with minerals tablet 1 tablet, oral, Daily    QUEtiapine (SEROQUEL) 25 mg, oral, Nightly    sertraline (ZOLOFT) 50 mg, oral, Daily        IV Medications:          Review of Systems:      Review of Systems   Constitutional:  Positive for fatigue. Negative for chills and fever.   HENT:  Positive for congestion.    Eyes: Negative.    Respiratory: Negative.     Cardiovascular: Negative.    Gastrointestinal:  Positive for nausea.   Genitourinary: Negative.    Musculoskeletal:  Positive for arthralgias.   Hematological:  Bruises/bleeds easily.   Psychiatric/Behavioral:  Positive for dysphoric mood. The patient is nervous/anxious.    All other systems reviewed and are negative.      Vital Signs:     Vitals:    06/29/24 0400 06/29/24 0600 06/29/24 0800 06/29/24 1200   BP: (!) 125/93  (!) 147/109 (!) 151/104   BP Location:   Right arm Right arm   Patient Position:   Lying Lying   Pulse: 97  102 103   Resp: 17  16 16   Temp: 36.2 °C (97.2 °F)  36.7 °C (98.1 °F) 36.3 °C (97.3 °F)   TempSrc: Temporal  Temporal Temporal   SpO2: 95%  95% 97%   Weight:  77.3 kg (170 lb 6.7 oz)     Height:           Intake/Output Summary (Last 24 hours) at 6/29/2024 1405  Last data filed at 6/29/2024 1300  Gross per 24 hour   Intake 400 ml   Output 320 ml   Net 80 ml     Vitals:    06/29/24 0600   Weight: 77.3 kg (170 lb 6.7 oz)       Physical Examination:     GENERAL APPEARANCE: Well developed, well nourished, in no acute  "distress.  HEENT: No gross abnormalities of conjunctiva, teeth, gums, oral mucosa  NECK: Supple, no JVD, no bruit. Thyroid not palpable. Carotid upstrokes normal.  CHEST: Symmetric and non-tender.  LUNGS: Clear to auscultation bilaterally; normal respiratory effort.  HEART: PMI is nondisplaced.  There is regular rhythm with mildly tachycardic with a normal S1 and S2.  There is no S3, murmur or rub.  No carotid abdominal or femoral bruits with normal distal perfusion without edema.  ABDOMEN: Soft, nontender, no palpable hepatosplenomegaly, no mases, no bruits. Abdominal aorta not noted to be enlarged.  MUSCULOSKELETAL: Ambulatory with normal tandem gait.  EXTREMITIES: Warm with good color, no clubbing or cyanois. There is no edema noted.  PERIPHERAL VASCULAR: Pulses present and equally palpable; 2+ throughout. No femoral bruits.  NEURO/PSHCY: Alert and oriented x3; no focal motor deficits  INTEGUMENT: Skin warm and dry, without gross excoriationis or lesions.  LYMPH: No significant palpable lymphadenopathy      Lab:     CBC:   Lab Results   Component Value Date    WBC 5.4 06/29/2024    RBC 4.26 (L) 06/29/2024    HGB 13.9 06/29/2024    HCT 42.1 06/29/2024     06/29/2024        CMP:    Lab Results   Component Value Date     06/29/2024    K 4.0 06/29/2024     06/29/2024    CO2 24 06/29/2024    BUN 16 06/29/2024    CREATININE 0.65 06/29/2024    GLUCOSE 109 (H) 06/29/2024    CALCIUM 9.6 06/29/2024       Magnesium:    Lab Results   Component Value Date    MG 1.71 06/29/2024       Lipid Profile:    No results found for: \"CHLPL\", \"TRIG\", \"HDL\", \"LDLCALC\", \"LDLDIRECT\"    TSH:    Lab Results   Component Value Date    TSH 2.00 06/28/2024       BNP:   Lab Results   Component Value Date     (H) 06/27/2024        PT/INR:    No results found for: \"PROTIME\", \"INR\"    HgBA1c:    No results found for: \"HGBA1C\"    BMP:  Lab Results   Component Value Date     06/29/2024     06/29/2024     (L) " 06/28/2024    K 4.0 06/29/2024    K 3.9 06/29/2024    K 3.7 06/28/2024     06/29/2024     06/29/2024     06/28/2024    CO2 24 06/29/2024    CO2 23 06/29/2024    CO2 26 06/28/2024    BUN 16 06/29/2024    BUN 15 06/29/2024    BUN 7 06/28/2024    CREATININE 0.65 06/29/2024    CREATININE 0.61 06/29/2024    CREATININE 0.71 06/28/2024       CBC:  Lab Results   Component Value Date    WBC 5.4 06/29/2024    WBC 8.2 06/28/2024    WBC 10.3 06/27/2024    RBC 4.26 (L) 06/29/2024    RBC 4.20 (L) 06/28/2024    RBC 4.11 (L) 06/27/2024    HGB 13.9 06/29/2024    HGB 13.9 06/28/2024    HGB 13.5 06/27/2024    HCT 42.1 06/29/2024    HCT 40.2 (L) 06/28/2024    HCT 39.1 (L) 06/27/2024    MCV 99 06/29/2024    MCV 96 06/28/2024    MCV 95 06/27/2024    MCH 32.6 06/29/2024    MCH 33.1 06/28/2024    MCH 32.8 06/27/2024    MCHC 33.0 06/29/2024    MCHC 34.6 06/28/2024    MCHC 34.5 06/27/2024    RDW 13.2 06/29/2024    RDW 12.8 06/28/2024    RDW 12.9 06/27/2024     06/29/2024     06/28/2024     06/27/2024       Cardiac Enzymes:    Lab Results   Component Value Date    TROPHS 18 06/26/2024       Hepatic Function Panel:    Lab Results   Component Value Date    ALKPHOS 53 06/29/2024    ALT 6 (L) 06/29/2024    AST 11 06/29/2024    PROT 6.6 06/29/2024    BILITOT 0.7 06/29/2024         Diagnostic Studies:     Transthoracic Echo (TTE) Complete    Result Date: 6/28/2024            [facility] [place] [id number] [id number]    Tel [phone] Fax [phone] TRANSTHORACIC ECHOCARDIOGRAM REPORT Patient Name:      SUSIE Capone Physician:    [id number] Juanita Alberto MD Study Date:        6/27/2024             Ordering Provider:    [id number] NORMA RAMOS MRN/PID:           [id number]              Fellow: Accession#:        [id number]          Nurse: Date of  Birth/Age: 1965 / 58 years   Sonographer:          Janie Diallo RDCS Gender:            M                     Additional Staff: Height:            175.26 cm             Admit Date: Weight:            79.83 kg              Admission Status:     Inpatient -                                                                Priority                                                                discharge BSA / BMI:         1.96 m2 / 25.99 kg/m2 Department Location:  [facility] ICU Front                                                                (19-26) Blood Pressure: 129 /89 mmHg Study Type:    TRANSTHORACIC ECHO (TTE) COMPLETE Diagnosis/ICD: Shortness of breath-R06.02 Indication:    shortness of breath; concern for alcohol/htn induced                cardiomyopathy, pulmonary edema  Study Detail: The following Echo studies were performed: M-Mode, 2D, Doppler and               color flow. Technically challenging study due to patient lying in               supine position. The patient was asleep.  PHYSICIAN INTERPRETATION: Left Ventricle: Left ventricular ejection fraction is normal, calculated by Finch's biplane at 54%. There are no regional wall motion abnormalities. The left ventricular cavity size is normal. There is no evidence of left ventricular hypertrophy. Spectral Doppler shows an impaired relaxation pattern of left ventricular diastolic filling. The intraventricular septum appears intact without evidence of shunting or a ventricular septal defect. Left Atrium: The left atrium is normal in size. There is no atrial septal defect present. Right Ventricle: The right ventricle is normal in size. There is normal right ventricular global systolic function. Right Atrium: The right atrium is normal in size. Aortic Valve: The aortic valve is probably trileaflet. There is no evidence of aortic valve stenosis. There is no evidence of aortic valve  regurgitation. The peak instantaneous gradient of the aortic valve is 5.4 mmHg. Mitral Valve: The mitral valve is mildly thickened. There is no evidence of mitral valve prolapse. There is no evidence of mitral valve stenosis. There is trace mitral valve regurgitation. Tricuspid Valve: The tricuspid valve is structurally normal. There is no evidence of tricuspid valve stenosis. There is trace tricuspid regurgitation. The Doppler estimated RVSP is within normal limits at 21.7 mmHg. Pulmonic Valve: The pulmonic valve is not well visualized. There is no indication of pulmonic valve regurgitation. Pericardium: There is no pericardial effusion noted. There is an anterior clear space. Aorta: The aortic root is normal.  CONCLUSIONS:  1. No definite valvular vegetations were visualized.  2. Left ventricular ejection fraction is normal, calculated by Finch's biplane at 54%.  3. Spectral Doppler shows an impaired relaxation pattern of left ventricular diastolic filling.  4. Intact intraventricular septum without shunting or a ventricular septal defect.  5. There is no evidence of left ventricular hypertrophy.  6. There is normal right ventricular global systolic function.  7. No evidence of mitral valve prolapse.  8. There is No tricuspid stenosis.  9. RVSP within normal limits. 10. Aortic valve stenosis is not present. QUANTITATIVE DATA SUMMARY: 2D MEASUREMENTS:                          Normal Ranges: LAs:           3.00 cm   (2.7-4.0cm) IVSd:          1.05 cm   (0.6-1.1cm) LVPWd:         1.04 cm   (0.6-1.1cm) LVIDd:         3.91 cm   (3.9-5.9cm) LVIDs:         2.85 cm LV Mass Index: 66.7 g/m2 LV % FS        27.1 % LA VOLUME:                             Normal Ranges: LA Area A4C:     14.6 cm2 LA Area A2C:     15.3 cm2 LA Volume Index: 20.0 ml/m2 LA Vol A4C:      30.0 ml LA Vol A2C:      42.0 ml LA Vol BP:       40.0 ml M-MODE MEASUREMENTS:                  Normal Ranges: Ao Root: 3.20 cm (2.0-3.7cm) AoV Exc: 2.10 cm  (1.5-2.5cm) AORTA MEASUREMENTS:                    Normal Ranges: AoV Exc:   2.10 cm (1.5-2.5cm) Asc Ao, d: 3.50 cm (2.1-3.4cm) LV SYSTOLIC FUNCTION BY 2D PLANIMETRY (MOD):                      Normal Ranges: EF-A4C View:    52 % (>=55%) EF-A2C View:    54 % EF-Biplane:     54 % LV EF Reported: 54 % LV DIASTOLIC FUNCTION:                               Normal Ranges: MV Peak E:        0.54 m/s    (0.7-1.2 m/s) MV Peak A:        0.71 m/s    (0.42-0.7 m/s) E/A Ratio:        0.76        (1.0-2.2) MV e'             0.058 m/s   (>8.0) MV lateral e'     0.05 m/s MV medial e'      0.06 m/s E/e' Ratio:       9.26        (<8.0) PulmV Sys Caio:    66.20 cm/s PulmV Schmitz Caio:   45.00 cm/s PulmV S/D Caio:    1.50 PulmV A Revs Caio: 27.70 cm/s PulmV A Revs Dur: 143.00 msec MITRAL VALVE:                 Normal Ranges: MV DT: 206 msec (150-240msec) AORTIC VALVE:                         Normal Ranges: AoV Vmax:      1.16 m/s (<=1.7m/s) AoV Peak P.4 mmHg (<20mmHg) LVOT Max Acio:  1.03 m/s (<=1.1m/s) LVOT Diameter: 2.20 cm  (1.8-2.4cm) AoV Area,Vmax: 3.38 cm2 (2.5-4.5cm2)  RIGHT VENTRICLE: RV Basal 2.80 cm RV Mid   2.40 cm RV Major 7.0 cm TAPSE:   17.0 mm TRICUSPID VALVE/RVSP:                             Normal Ranges: Peak TR Velocity: 2.16 m/s RV Syst Pressure: 21.7 mmHg (< 30mmHg) Pulmonary Veins: PulmV A Revs Dur: 143.00 msec PulmV A Revs Caio: 27.70 cm/s PulmV Schmitz Caio:   45.00 cm/s PulmV S/D Caio:    1.50 PulmV Sys Caio:    66.20 cm/s  [id number] Juanita Alberto MD Electronically signed on 2024 at 3:40:13 PM  ** Final **     XR chest 1 view    Result Date: 2024  Interpreted By:  Kenny Carmichael, STUDY: XR CHEST 1 VIEW;  2024 4:02 pm   INDICATION: Signs/Symptoms:monitor fluid correction.   COMPARISON: 2024 at 8:21 a.m.   ACCESSION NUMBER(S): [id number]   ORDERING CLINICIAN: NORMA RAMOS   FINDINGS: Right upper lobe opacities have decreased. Pulmonary vascular congestion persists. Pleural effusions not excluded.  Cardiomediastinal silhouette unchanged.       Right upper lobe opacities have decreased. Pulmonary vascular congestion otherwise not significantly changed.   MACRO: None   Signed by: Kenny Carimchael 6/27/2024 4:16 PM Dictation workstation:   [id number]    XR chest 1 view    Result Date: 6/27/2024  Interpreted By:  Kenny Carmichael, STUDY: XR CHEST 1 VIEW;  6/27/2024 8:35 am   INDICATION: Signs/Symptoms:new osygen requirment with crackle breath sounds.   COMPARISON: 06/26/2024   ACCESSION NUMBER(S): [id number]   ORDERING CLINICIAN: NORMA RAMOS   FINDINGS: Increasing hazy right upper lobe opacities. Left lung grossly clear. No apparent pleural effusion. Cardiomediastinal silhouette unchanged. Pulmonary vascular congestion.       Increasing right upper lobe edema or infiltrate. Pulmonary vascular congestion.   MACRO: None   Signed by: Kenny Carmichael 6/27/2024 8:58 AM Dictation workstation:   [id number]    XR chest 1 view    Result Date: 6/26/2024  STUDY: Chest Radiograph;  6/26/2024 3:07 PM. INDICATION: Altered mental status. COMPARISON: Chest and rib radiographs 5/9/2024. ACCESSION NUMBER(S): [id number] ORDERING CLINICIAN: YOUSIF CLAROS TECHNIQUE:  Frontal chest was obtained at 15:07 hours. FINDINGS: CARDIOMEDIASTINAL SILHOUETTE: Cardiomediastinal silhouette is normal in size and configuration.  LUNGS: Patchy left basilar infiltrate or atelectasis and probable small left pleural effusion.  ABDOMEN: No remarkable upper abdominal findings.  BONES: No acute osseous changes. Old left-sided rib fractures again noted.    Patchy left basilar infiltrate or atelectasis and probable small left pleural effusion. Signed by Aldo Dupree MD    ECG 12 lead    Result Date: 6/26/2024  Sinus tachycardia with short SD Nonspecific ST abnormality Abnormal ECG No previous ECGs available    CT cervical spine wo IV contrast    Result Date: 6/26/2024  Interpreted By:  Len Briones, STUDY: CT CERVICAL SPINE WO IV CONTRAST;  6/26/2024 2:46  pm   INDICATION: Fall and injury   COMPARISON: None.   ACCESSION NUMBER(S): [id number]   ORDERING CLINICIAN: YOUSIF CLAROS   TECHNIQUE: Transaxial images with orthogonal reconstructions   FINDINGS: VERTEBRAL ALIGNMENT: Within normal limits.   CRANIOCERVICAL JUNCTION: Unremarkable   BONY STRUCTURES: No fracture or dislocation are evident.   THE CERVICAL LEVELS INCLUDING DISC SPACES, APOPHYSEAL AND UNCOVERTEBRAL JOINTS:  Mild spondylosis. Primarily there is moderate narrowing of the C4-5 disc interspace with moderate anterior marginal osteophyte formation. There is also mild uncovertebral hypertrophy with mild foraminal impingement. There is also moderate narrowing of the C5-6 disc interspace. There is mild multilevel apophyseal hypertrophy..   ADDITIONAL FINDINGS: Mild mucosal thickening of the maxillary air cells indicating mild sinusitis.       Mild spondylosis, otherwise no acute findings.   Signed by: Len Briones 6/26/2024 2:59 PM Dictation workstation:   [id number]    CT brain attack head wo IV contrast    Result Date: 6/26/2024  Interpreted By:  Len Briones, STUDY: CT BRAIN ATTACK HEAD WO IV CONTRAST;  6/26/2024 2:46 pm   INDICATION: Signs/Symptoms:AMS.   COMPARISON: None.   ACCESSION NUMBER(S): [id number]   ORDERING CLINICIAN: YOUSIF CLAROS   TECHNIQUE: Sequential trans axial images were obtained  .   FINDINGS: INTRACRANIAL:   CORTICAL SULCI AND EXTRA-AXIAL SPACES:  Mild prominence indicating age related atrophy.   VENTRICULAR SYSTEM:  Also mild ventriculomegaly indicating age related atrophy.   CEREBRAL PARENCHYMA:  Unremarkable without significant degenerative change.There is no evidence of definite subacute infarction, intracranial hemorrhage or mass.   EXTRACRANIAL: Visualized paranasal sinuses and mastoids are clear. The calvarium is intact.       Unremarkable exam, without acute findings.   MACRO: Len Briones discussed the significance and urgency of this critical finding by telephone with   YOUSIF HARLAN on 6/26/2024 at 2:56 pm. (**-RCF-**) Findings:  See findings.   Signed by: Len Brioens 6/26/2024 2:56 PM Dictation workstation:   [id number]      Radiology:     XR chest 1 view   Final Result   Right upper lobe opacities have decreased.   Pulmonary vascular congestion otherwise not significantly changed.        MACRO:   None        Signed by: Kenny Carmichael 6/27/2024 4:16 PM   Dictation workstation:   [id number]      Transthoracic Echo (TTE) Complete   Final Result      XR chest 1 view   Final Result   Increasing right upper lobe edema or infiltrate.   Pulmonary vascular congestion.        MACRO:   None        Signed by: Kenny Carmichael 6/27/2024 8:58 AM   Dictation workstation:   [id number]      XR chest 1 view   Final Result   Patchy left basilar infiltrate or atelectasis and probable small left   pleural effusion.   Signed by Aldo Dupree MD      CT brain attack head wo IV contrast   Final Result   Unremarkable exam, without acute findings.        MACRO:   Len Briones discussed the significance and urgency of this critical   finding by telephone with  YOUSIF HARLAN on 6/26/2024 at 2:56 pm.   (**-RCF-**) Findings:  See findings.        Signed by: Len Briones 6/26/2024 2:56 PM   Dictation workstation:   [id number]      CT cervical spine wo IV contrast   Final Result   Mild spondylosis, otherwise no acute findings.        Signed by: Len Briones 6/26/2024 2:59 PM   Dictation workstation:   [id number]          Assessment:     Problem List Items Addressed This Visit       * (Principal) Alcohol withdrawal syndrome, with delirium (Multi) - Primary    SOB (shortness of breath)    Relevant Orders    Transthoracic Echo (TTE) Complete (Completed)     Other Visit Diagnoses       Delirium                Patient Active Problem List   Diagnosis    Closed dislocation finger, proximal interphalangeal joint, traumatic    Delayed emergence from anesthesia    Depression    Alcohol withdrawal syndrome, with  delirium (Multi)    SOB (shortness of breath)       Plan:     1.  Alcohol withdrawal with delirium.  Defer treatment to the primary team.  2.  Elevated BN peptide.  Patient's BN peptide was elevated he had been given volume resuscitation on this admission and is then been diuresed.  This may have just lagged from his prior area of time of volume overload.  I reviewed the chest x-rays and while there is some increased vascularity there is no sign of any pulmonary edema.  Given his normal examination without pulmonary edema JVD or peripheral edema on exam I do not believe this is a sign of congestive heart failure.  Added to this the patient's LV function is normal.  I would not recommend any additional diuresis at this time.  3.  Sinus tachycardia.  This is most likely related to the patient's anxiety and as a symptom of his alcohol withdrawal.  His heart rates have decreased from the 130s when he was admitted down down to 100-110.  A low-dose beta-blocker can be given however this can be weaned off as the patient is further out from his alcohol withdrawal.  4.  Abnormal EKG with family history of coronary disease.  Patient has no signs or symptoms of angina pectoris and no regional wall motion abnormalities.  With his brother having a cardiac event at age 54 think it is appropriate that the patient should have risk stratification after he recovers from his alcohol withdrawal and electively as an outpatient.  I would recommend starting with a CT cardiac score and if that is elevated he could be referred for elective stress testing.  I discussed this with the patient and have given him a card and office note that he can make this contact once he is relieved from his treatment program.    No additional cardiovascular testing is necessary at this time and the patient can be discharged to his treatment center or rehab program when felt clinically appropriate.  Please call with any further questions.    Thank you for the  opportunity to participate in the care of your patient.  Please do not hesitate to call if you have any questions.    Electronically signed by Juanita Alberto MD, on 6/29/2024 at 2:05 PM

## 2024-06-29 NOTE — SIGNIFICANT EVENT
0934 Patient has not urinated all night. Patient bladder scanned this am 210ml. Patient urinated 10ml of jerod urine after being bladder scanned. Delores Lovell CNP notified, new order to place allen. To encourage oral fluid intake.   1013 stat EKG obtained and reported to Delores Lovell CNP

## 2024-06-30 VITALS
HEIGHT: 69 IN | TEMPERATURE: 97.5 F | OXYGEN SATURATION: 97 % | BODY MASS INDEX: 25.4 KG/M2 | SYSTOLIC BLOOD PRESSURE: 132 MMHG | WEIGHT: 171.52 LBS | HEART RATE: 74 BPM | RESPIRATION RATE: 16 BRPM | DIASTOLIC BLOOD PRESSURE: 81 MMHG

## 2024-06-30 LAB
ALBUMIN SERPL BCP-MCNC: 3.4 G/DL (ref 3.4–5)
ALP SERPL-CCNC: 51 U/L (ref 33–120)
ALT SERPL W P-5'-P-CCNC: 9 U/L (ref 10–52)
ANION GAP SERPL CALC-SCNC: 12 MMOL/L (ref 10–20)
AST SERPL W P-5'-P-CCNC: 11 U/L (ref 9–39)
BACTERIA BLD CULT: NORMAL
BASOPHILS # BLD AUTO: 0.06 X10*3/UL (ref 0–0.1)
BASOPHILS NFR BLD AUTO: 0.9 %
BILIRUB SERPL-MCNC: 0.4 MG/DL (ref 0–1.2)
BUN SERPL-MCNC: 22 MG/DL (ref 6–23)
CALCIUM SERPL-MCNC: 9.4 MG/DL (ref 8.6–10.3)
CHLORIDE SERPL-SCNC: 108 MMOL/L (ref 98–107)
CO2 SERPL-SCNC: 22 MMOL/L (ref 21–32)
CREAT SERPL-MCNC: 0.64 MG/DL (ref 0.5–1.3)
EGFRCR SERPLBLD CKD-EPI 2021: >90 ML/MIN/1.73M*2
EOSINOPHIL # BLD AUTO: 0.23 X10*3/UL (ref 0–0.7)
EOSINOPHIL NFR BLD AUTO: 3.3 %
ERYTHROCYTE [DISTWIDTH] IN BLOOD BY AUTOMATED COUNT: 13.2 % (ref 11.5–14.5)
GLUCOSE SERPL-MCNC: 97 MG/DL (ref 74–99)
HCT VFR BLD AUTO: 40.5 % (ref 41–52)
HGB BLD-MCNC: 13.4 G/DL (ref 13.5–17.5)
IMM GRANULOCYTES # BLD AUTO: 0.05 X10*3/UL (ref 0–0.7)
IMM GRANULOCYTES NFR BLD AUTO: 0.7 % (ref 0–0.9)
LYMPHOCYTES # BLD AUTO: 2.22 X10*3/UL (ref 1.2–4.8)
LYMPHOCYTES NFR BLD AUTO: 31.7 %
MAGNESIUM SERPL-MCNC: 1.61 MG/DL (ref 1.6–2.4)
MCH RBC QN AUTO: 32.5 PG (ref 26–34)
MCHC RBC AUTO-ENTMCNC: 33.1 G/DL (ref 32–36)
MCV RBC AUTO: 98 FL (ref 80–100)
MONOCYTES # BLD AUTO: 0.72 X10*3/UL (ref 0.1–1)
MONOCYTES NFR BLD AUTO: 10.3 %
NEUTROPHILS # BLD AUTO: 3.73 X10*3/UL (ref 1.2–7.7)
NEUTROPHILS NFR BLD AUTO: 53.1 %
NRBC BLD-RTO: 0 /100 WBCS (ref 0–0)
PHOSPHATE SERPL-MCNC: 4.5 MG/DL (ref 2.5–4.9)
PLATELET # BLD AUTO: 263 X10*3/UL (ref 150–450)
POTASSIUM SERPL-SCNC: 3.9 MMOL/L (ref 3.5–5.3)
PROT SERPL-MCNC: 6.4 G/DL (ref 6.4–8.2)
RBC # BLD AUTO: 4.12 X10*6/UL (ref 4.5–5.9)
SODIUM SERPL-SCNC: 138 MMOL/L (ref 136–145)
WBC # BLD AUTO: 7 X10*3/UL (ref 4.4–11.3)

## 2024-06-30 PROCEDURE — 84100 ASSAY OF PHOSPHORUS: CPT | Performed by: PHYSICIAN ASSISTANT

## 2024-06-30 PROCEDURE — 85025 COMPLETE CBC W/AUTO DIFF WBC: CPT | Performed by: PHYSICIAN ASSISTANT

## 2024-06-30 PROCEDURE — 84075 ASSAY ALKALINE PHOSPHATASE: CPT | Performed by: PHYSICIAN ASSISTANT

## 2024-06-30 PROCEDURE — 2500000004 HC RX 250 GENERAL PHARMACY W/ HCPCS (ALT 636 FOR OP/ED): Performed by: PHYSICIAN ASSISTANT

## 2024-06-30 PROCEDURE — 2500000002 HC RX 250 W HCPCS SELF ADMINISTERED DRUGS (ALT 637 FOR MEDICARE OP, ALT 636 FOR OP/ED): Performed by: PHYSICIAN ASSISTANT

## 2024-06-30 PROCEDURE — 2500000001 HC RX 250 WO HCPCS SELF ADMINISTERED DRUGS (ALT 637 FOR MEDICARE OP): Performed by: INTERNAL MEDICINE

## 2024-06-30 PROCEDURE — 97535 SELF CARE MNGMENT TRAINING: CPT | Mod: GO,CO

## 2024-06-30 PROCEDURE — 36415 COLL VENOUS BLD VENIPUNCTURE: CPT | Performed by: PHYSICIAN ASSISTANT

## 2024-06-30 PROCEDURE — 2500000001 HC RX 250 WO HCPCS SELF ADMINISTERED DRUGS (ALT 637 FOR MEDICARE OP): Performed by: PHYSICIAN ASSISTANT

## 2024-06-30 PROCEDURE — 97110 THERAPEUTIC EXERCISES: CPT | Mod: GP

## 2024-06-30 PROCEDURE — 1100000001 HC PRIVATE ROOM DAILY

## 2024-06-30 PROCEDURE — 97116 GAIT TRAINING THERAPY: CPT | Mod: GP

## 2024-06-30 PROCEDURE — 83735 ASSAY OF MAGNESIUM: CPT | Performed by: PHYSICIAN ASSISTANT

## 2024-06-30 ASSESSMENT — LIFESTYLE VARIABLES
HEADACHE, FULLNESS IN HEAD: NOT PRESENT
ORIENTATION AND CLOUDING OF SENSORIUM: ORIENTED AND CAN DO SERIAL ADDITIONS
PAROXYSMAL SWEATS: NO SWEAT VISIBLE
TOTAL SCORE: 0
ANXIETY: NO ANXIETY, AT EASE
AGITATION: NORMAL ACTIVITY
AGITATION: NORMAL ACTIVITY
TOTAL SCORE: 0
TOTAL SCORE: 0
ANXIETY: NO ANXIETY, AT EASE
NAUSEA AND VOMITING: NO NAUSEA AND NO VOMITING
AGITATION: NORMAL ACTIVITY
TREMOR: NO TREMOR
NAUSEA AND VOMITING: NO NAUSEA AND NO VOMITING
HEADACHE, FULLNESS IN HEAD: NOT PRESENT
PAROXYSMAL SWEATS: NO SWEAT VISIBLE
HEADACHE, FULLNESS IN HEAD: NOT PRESENT
TOTAL SCORE: 0
ORIENTATION AND CLOUDING OF SENSORIUM: ORIENTED AND CAN DO SERIAL ADDITIONS
AUDITORY DISTURBANCES: NOT PRESENT
VISUAL DISTURBANCES: NOT PRESENT
ORIENTATION AND CLOUDING OF SENSORIUM: ORIENTED AND CAN DO SERIAL ADDITIONS
VISUAL DISTURBANCES: NOT PRESENT
AGITATION: NORMAL ACTIVITY
ORIENTATION AND CLOUDING OF SENSORIUM: ORIENTED AND CAN DO SERIAL ADDITIONS
TOTAL SCORE: 0
PAROXYSMAL SWEATS: NO SWEAT VISIBLE
AGITATION: NORMAL ACTIVITY
TREMOR: NO TREMOR
ORIENTATION AND CLOUDING OF SENSORIUM: ORIENTED AND CAN DO SERIAL ADDITIONS
VISUAL DISTURBANCES: NOT PRESENT
TREMOR: NO TREMOR
TREMOR: NO TREMOR
VISUAL DISTURBANCES: NOT PRESENT
PAROXYSMAL SWEATS: NO SWEAT VISIBLE
ANXIETY: NO ANXIETY, AT EASE
AUDITORY DISTURBANCES: NOT PRESENT
NAUSEA AND VOMITING: NO NAUSEA AND NO VOMITING
ORIENTATION AND CLOUDING OF SENSORIUM: ORIENTED AND CAN DO SERIAL ADDITIONS
TREMOR: NO TREMOR
AGITATION: NORMAL ACTIVITY
PAROXYSMAL SWEATS: NO SWEAT VISIBLE
PAROXYSMAL SWEATS: NO SWEAT VISIBLE
HEADACHE, FULLNESS IN HEAD: NOT PRESENT
ANXIETY: NO ANXIETY, AT EASE
TREMOR: NO TREMOR
AUDITORY DISTURBANCES: NOT PRESENT
ANXIETY: NO ANXIETY, AT EASE
AUDITORY DISTURBANCES: NOT PRESENT
AUDITORY DISTURBANCES: NOT PRESENT
HEADACHE, FULLNESS IN HEAD: NOT PRESENT
TOTAL SCORE: 0
NAUSEA AND VOMITING: NO NAUSEA AND NO VOMITING
NAUSEA AND VOMITING: NO NAUSEA AND NO VOMITING
ANXIETY: NO ANXIETY, AT EASE
AUDITORY DISTURBANCES: NOT PRESENT
HEADACHE, FULLNESS IN HEAD: NOT PRESENT
NAUSEA AND VOMITING: NO NAUSEA AND NO VOMITING

## 2024-06-30 ASSESSMENT — COGNITIVE AND FUNCTIONAL STATUS - GENERAL
WALKING IN HOSPITAL ROOM: A LITTLE
DRESSING REGULAR LOWER BODY CLOTHING: A LITTLE
CLIMB 3 TO 5 STEPS WITH RAILING: A LITTLE
CLIMB 3 TO 5 STEPS WITH RAILING: A LITTLE
DRESSING REGULAR LOWER BODY CLOTHING: A LITTLE
STANDING UP FROM CHAIR USING ARMS: A LITTLE
DAILY ACTIVITIY SCORE: 21
TURNING FROM BACK TO SIDE WHILE IN FLAT BAD: A LITTLE
MOBILITY SCORE: 18
TOILETING: A LITTLE
DAILY ACTIVITIY SCORE: 19
DRESSING REGULAR UPPER BODY CLOTHING: A LITTLE
HELP NEEDED FOR BATHING: A LITTLE
WALKING IN HOSPITAL ROOM: A LITTLE
MOVING TO AND FROM BED TO CHAIR: A LITTLE
HELP NEEDED FOR BATHING: A LITTLE
PERSONAL GROOMING: A LITTLE
MOVING FROM LYING ON BACK TO SITTING ON SIDE OF FLAT BED WITH BEDRAILS: A LITTLE
MOBILITY SCORE: 22
DRESSING REGULAR UPPER BODY CLOTHING: A LITTLE

## 2024-06-30 ASSESSMENT — ACTIVITIES OF DAILY LIVING (ADL): HOME_MANAGEMENT_TIME_ENTRY: 12

## 2024-06-30 ASSESSMENT — COLUMBIA-SUICIDE SEVERITY RATING SCALE - C-SSRS
2. HAVE YOU ACTUALLY HAD ANY THOUGHTS OF KILLING YOURSELF?: NO
6. HAVE YOU EVER DONE ANYTHING, STARTED TO DO ANYTHING, OR PREPARED TO DO ANYTHING TO END YOUR LIFE?: NO
5. HAVE YOU STARTED TO WORK OUT OR WORKED OUT THE DETAILS OF HOW TO KILL YOURSELF? DO YOU INTEND TO CARRY OUT THIS PLAN?: NO
6. HAVE YOU EVER DONE ANYTHING, STARTED TO DO ANYTHING, OR PREPARED TO DO ANYTHING TO END YOUR LIFE?: NO
1. SINCE LAST CONTACT, HAVE YOU WISHED YOU WERE DEAD OR WISHED YOU COULD GO TO SLEEP AND NOT WAKE UP?: NO
1. SINCE LAST CONTACT, HAVE YOU WISHED YOU WERE DEAD OR WISHED YOU COULD GO TO SLEEP AND NOT WAKE UP?: NO
2. HAVE YOU ACTUALLY HAD ANY THOUGHTS OF KILLING YOURSELF?: NO
5. HAVE YOU STARTED TO WORK OUT OR WORKED OUT THE DETAILS OF HOW TO KILL YOURSELF? DO YOU INTEND TO CARRY OUT THIS PLAN?: NO

## 2024-06-30 ASSESSMENT — PAIN SCALES - GENERAL
PAINLEVEL_OUTOF10: 0 - NO PAIN

## 2024-06-30 ASSESSMENT — PAIN - FUNCTIONAL ASSESSMENT: PAIN_FUNCTIONAL_ASSESSMENT: 0-10

## 2024-06-30 NOTE — NURSING NOTE
Patient allen removed at 1400 for 3rd void trial. Patient walked around unit with PT. Patient scoring 0 on CIWA scale and has no complaints at this time. Sitter at bedside and suicide precautions in place.     Patient remained pleasant and cooperative throughout shift. No major changes. Still no urine output since allen removed. Seizure precautions remain in place. Patient stable walking to bathroom.

## 2024-06-30 NOTE — PROGRESS NOTES
Physical Therapy    Physical Therapy Treatment    Patient Name: Everton Geiger  MRN: [id number]  Today's Date: 6/30/2024  Time Calculation  Start Time: 1256  Stop Time: 1323  Time Calculation (min): 27 min    Assessment/Plan   PT Assessment  Evaluation/Treatment Tolerance: Patient tolerated treatment well  Medical Staff Made Aware: Yes  End of Session Communication: Bedside nurse  Assessment Comment: Pt progressing appropriately and demos better stability on feet this date. Significant improvement in overall functional mobility requiring less assistance. Pt able to complete bed mobility, multiple transfers from various surfaces, gait training, seated/standing exercises and standing balance activities. Pt completed 5xSTS x2 trials with scoring indicating he is slightly at increased risk for falls. He will continue to benefit from skilled therapy during stay to improve overall functional mobility. He remains appropriate for high intensity therapy upon d/c.  End of Session Patient Position: Bed, 3 rail up, Alarm off, caregiver present (Sitter present and call light within reach)  PT Plan  Inpatient/Swing Bed or Outpatient: Inpatient  PT Plan  Treatment/Interventions: Bed mobility, Transfer training, Gait training, Stair training, Balance training, Neuromuscular re-education, Strengthening, Endurance training, Range of motion, Therapeutic exercise, Therapeutic activity, Home exercise program, Positioning, Postural re-education  PT Plan: Ongoing PT  PT Frequency: 5 times per week  PT Discharge Recommendations: High intensity level of continued care  Equipment Recommended upon Discharge: Wheeled walker  PT Recommended Transfer Status: Assist x1, Assistive device, Stand by assist  PT - OK to Discharge: Yes (Once medically cleared to next level of care.)    General Visit Information:   PT  Visit  PT Received On: 06/30/24  General  Reason for Referral: P/w AMS. Per EMS, patient's brother reported that patient had locked himself  in his room over the past few days.  On welfare check, patient was found laying on the bed with bottles of benzos as well as alcohol around him.  There is also Benadryl pills as well.  On arrival, patient was altered mumbling words. Pt admitted for alcohol withdrawal syndrome with delirium  Referred By: Dr. Bundy  Past Medical History Relevant to Rehab: Depression, alcohol abuse, closed dislocation finger, proximal interphalangeal joint, traumatic  Family/Caregiver Present: Yes  Caregiver Feedback: Sitter present  Prior to Session Communication: Bedside nurse  Patient Position Received: Bed, 3 rail up, Alarm off, caregiver present (Sitter present)  Preferred Learning Style: verbal, visual  General Comment: Pt pleasant and agreeable to work with therapy. Pt with no complaints at this time.    Subjective   Precautions:  Precautions  Medical Precautions: Fall precautions, Seizure precautions  Precautions Comment: SI precautions     Objective   Pain:  Pain Assessment  Pain Assessment: 0-10  0-10 (Numeric) Pain Score: 0 - No pain  Cognition:  Cognition  Overall Cognitive Status: Within Functional Limits  Orientation Level: Oriented X4    Activity Tolerance:  Activity Tolerance  Endurance: Endurance does not limit participation in activity    Treatments:  Pt able to tolerate the following activities during today's treatment session. Pt instructed/educated throughout the session on safety awareness, body mechanics and proper hand placement.     Therapeutic Exercise  Therapeutic Exercise Performed: Yes  Therapeutic Exercise Activity 1: Seated EOB: AP x20 BL, heel raises x20 BL, LAQ x10 BL, Hip adduction (pillow squeezes) x10  Therapeutic Exercise Activity 2: Standing with BUE support on FWW: hip/knee flex (marches) x10 BL CGA, mini squats 2x10 SBA  Therapeutic Exercise Activity 3: Standing with BUE support on hallway HR: Hip abduction 2x10 BL    Therapeutic Activity  Therapeutic Activity Performed: Yes  Therapeutic  Activity 1: Pt completed x2 trials of 5xSTS. See outcome measures for scoring explanation.  Therapeutic Activity 2: Static sitting with UE/LE support on bed to orient to position and complete LE exercises close supervision, in chair for rest break after gait training close supervision and on toilet distant supervision for a total of ~5 min.  Therapeutic Activity 3: Static standing with no UE support for cecilia care in bathroom distant supervision and completing UE tasks close supervision for a total of ~1 min. No LOB noted.    Balance/Neuromuscular Re-Education  Balance/Neuromuscular Re-Education Activity Performed: Yes  Balance/Neuromuscular Re-Education Activity 1: Standing with feet together no UE support x2 trials for ~30 sec no unsteadiness noted SBA. Pt able to progress to SLS with finger support on FWW SBA with bouts of CGA as pt demos unsteadiness 30 sec x2 trials BL.    Bed Mobility  Bed Mobility: Yes  Bed Mobility 1  Bed Mobility 1: Supine to sitting, Sitting to supine, Scooting  Level of Assistance 1: Close supervision  Bed Mobility Comments 1: HOB elevated    Ambulation/Gait Training  Ambulation/Gait Training Performed: Yes  Ambulation/Gait Training 1  Surface 1: Level tile  Device 1: Rolling walker  Gait Support Devices: Gait belt  Assistance 1:  (~250 ft CGA with progression to SBA ~250 ft)  Quality of Gait 1: Wide base of support, Diminished heel strike, Forward flexed posture  Comments/Distance (ft) 1: ~500 ft, x2 trials of standing exercises between gait training and standing execises after gait training without rest breaks  Ambulation/Gait Training 2  Surface 2: Level tile  Device 2: Rolling walker  Gait Support Devices: Gait belt  Assistance 2:  (SBA)  Quality of Gait 2: Wide base of support, Diminished heel strike, Forward flexed posture  Comments/Distance (ft) 2: ~20 ft in room    Transfers  Transfer: Yes  Transfer 1  Transfer From 1: Bed to, Stand to  Transfer to 1: Stand, Bed  Technique 1: Sit  to stand, Stand to sit  Transfer Device 1: Walker, Gait belt  Transfer Level of Assistance 1:  (x1 trial CGA, x1 trials SBA)  Trials/Comments 1: x2 trials  Transfers 2  Transfer From 2: Stand to, Chair with arms to  Transfer to 2: Chair with arms, Stand  Technique 2: Sit to stand, Stand to sit  Transfer Device 2: Walker, Gait belt  Transfer Level of Assistance 2:  (SBA)  Trials/Comments 2: x12 trials (x10 trials for 5xSTS completed twice)  Transfers 3  Transfer From 3: Stand to, Toilet to  Transfer to 3: Toilet, Stand  Technique 3: Sit to stand, Stand to sit  Transfer Device 3: Walker, Gait belt  Transfer Level of Assistance 3: Distant supervision    Outcome Measures:  [facility] Basic Mobility  Turning from your back to your side while in a flat bed without using bedrails: A little  Moving from lying on your back to sitting on the side of a flat bed without using bedrails: A little  Moving to and from bed to chair (including a wheelchair): A little  Standing up from a chair using your arms (e.g. wheelchair or bedside chair): A little  To walk in hospital room: A little  Climbing 3-5 steps with railing: A little  Basic Mobility - Total Score: 18    Other Measures  5x Sit to Stand: trial 1: 14.46 seconds; trial 2: 10.29 seconds  Patient completed 5xSTS in 10.29 seconds with use of UEs. A time of >12 seconds indicates increased risk for falls.      Education Documentation  Precautions, taught by Mayra Meredith PT at 6/30/2024  1:47 PM.  Learner: Patient  Readiness: Acceptance  Method: Explanation  Response: Verbalizes Understanding, Demonstrated Understanding, Needs Reinforcement    Body Mechanics, taught by Mayra Meredith PT at 6/30/2024  1:47 PM.  Learner: Patient  Readiness: Acceptance  Method: Explanation  Response: Verbalizes Understanding, Demonstrated Understanding, Needs Reinforcement    Home Exercise Program, taught by Mayra Meredith PT at 6/30/2024  1:47 PM.  Learner: Patient  Readiness:  Acceptance  Method: Explanation  Response: Verbalizes Understanding, Demonstrated Understanding, Needs Reinforcement    Mobility Training, taught by Mayra Meredith, PT at 6/30/2024  1:47 PM.  Learner: Patient  Readiness: Acceptance  Method: Explanation  Response: Verbalizes Understanding, Demonstrated Understanding, Needs Reinforcement    Education Comments  No comments found.      OP EDUCATION:       Encounter Problems       Encounter Problems (Active)       Balance       Pt will demonstrate static/dynamic standing balance for >/= 5 min CGA without evidence of instability or retro LOB with functional mobility tasks.   (Progressing)       Start:  06/28/24    Expected End:  07/12/24               Mobility       Pt will be able to ambulate >/= 50 ft with least restrictive device CGA with good safety awareness.  (Met)       Start:  06/28/24    Expected End:  07/12/24    Resolved:  06/30/24         Pt will complete supine, seated and standing exercises to maintain overall strength with minimal verbal cues.   (Progressing)       Start:  06/28/24    Expected End:  07/12/24            Pt will be able to negotiate ascending/descending flight of steps with use of unilateral HR SBA with sufficient foot clearance and good safety awareness for safe home going/community purposes.  (Progressing)       Start:  06/28/24    Expected End:  07/12/24               PT Transfers       Pt will be able to complete all bed mobility tasks independently from flat HOB.  (Progressing)       Start:  06/28/24    Expected End:  07/12/24            Pt will be able to complete all transfers with least restrictive device CGA demonstrating good safety awareness and proper body mechanics.  (Progressing)       Start:  06/28/24    Expected End:  07/12/24

## 2024-06-30 NOTE — PROGRESS NOTES
Everton Geiger is a 58 y.o. male on day 3 of admission presenting with Alcohol withdrawal syndrome, with delirium (Multi).    Subjective   No cp/sob       Objective         Current Facility-Administered Medications:     doxycycline (Vibramycin) capsule 100 mg, 100 mg, oral, q12h ALINE, Alton Griffith PA-C, 100 mg at 06/29/24 0818    enoxaparin (Lovenox) syringe 40 mg, 40 mg, subcutaneous, Daily, Alton Griffith PA-C, 40 mg at 06/29/24 0818    folic acid (Folvite) tablet 1 mg, 1 mg, oral, Daily, Alton Griffith PA-C, 1 mg at 06/29/24 0818    LORazepam (Ativan) injection 0.5 mg, 0.5 mg, intravenous, q2h PRN, 0.5 mg at 06/29/24 1605 **OR** LORazepam (Ativan) injection 1 mg, 1 mg, intravenous, q2h PRN **OR** LORazepam (Ativan) injection 2 mg, 2 mg, intravenous, q2h PRN, Alton Griffith PA-C, 2 mg at 06/28/24 0407    metoprolol succinate XL (Toprol-XL) 24 hr tablet 25 mg, 25 mg, oral, Daily, Juanita Alberto MD, 25 mg at 06/29/24 1429    multivitamin with minerals 1 tablet, 1 tablet, oral, Daily, Alton Griffith PA-C, 1 tablet at 06/29/24 0818    ondansetron ODT (Zofran-ODT) disintegrating tablet 4 mg, 4 mg, oral, q8h PRN **OR** ondansetron (Zofran) injection 4 mg, 4 mg, intravenous, q8h PRN, Delores Lovell, APRN-CNP    perflutren lipid microspheres (Definity) injection 0.5-10 mL of dilution, 0.5-10 mL of dilution, intravenous, Once in imaging, Alton Griffith PA-C    perflutren protein A microsphere (Optison) injection 0.5 mL, 0.5 mL, intravenous, Once in imaging, Alton Griffith PA-C    QUEtiapine (SEROquel) tablet 25 mg, 25 mg, oral, Nightly, Alton Griffith PA-C, 25 mg at 06/28/24 2033    sertraline (Zoloft) tablet 50 mg, 50 mg, oral, Nightly, Alton Griffith PA-C, 50 mg at 06/28/24 2033    sulfur hexafluoride microsphr (Lumason) injection 24.28 mg, 2 mL, intravenous, Once in imaging, Alton Griffith PA-C    thiamine (Vitamin B-1) tablet 100 mg, 100 mg, oral, Daily, Alton Griffith PA-C, 100  "mg at 06/29/24 1428       Physical Exam  HENT:      Head: Normocephalic.      Mouth/Throat:      Pharynx: Oropharynx is clear.   Eyes:      Conjunctiva/sclera: Conjunctivae normal.   Cardiovascular:      Rate and Rhythm: Regular rhythm.   Pulmonary:      Breath sounds: Normal breath sounds.   Abdominal:      General: Bowel sounds are normal.      Palpations: Abdomen is soft.   Musculoskeletal:         General: Normal range of motion.   Skin:     General: Skin is warm and dry.   Neurological:      General: No focal deficit present.      Mental Status: He is alert.   Psychiatric:         Behavior: Behavior normal.           Last Recorded Vitals  Blood pressure 117/83, pulse 105, temperature 36.4 °C (97.5 °F), temperature source Temporal, resp. rate 16, height 1.753 m (5' 9.02\"), weight 77.3 kg (170 lb 6.7 oz), SpO2 96%.  Intake/Output last 3 Shifts:  I/O last 3 completed shifts:  In: 880 (11.4 mL/kg) [P.O.:880]  Out: 406 (5.3 mL/kg) [Urine:406 (0.1 mL/kg/hr)]  Weight: 77.3 kg     Labs:       Results for orders placed or performed during the hospital encounter of 06/26/24 (from the past 24 hour(s))   Magnesium   Result Value Ref Range    Magnesium 1.79 1.60 - 2.40 mg/dL   CBC and Auto Differential   Result Value Ref Range    WBC 5.4 4.4 - 11.3 x10*3/uL    nRBC 0.0 0.0 - 0.0 /100 WBCs    RBC 4.26 (L) 4.50 - 5.90 x10*6/uL    Hemoglobin 13.9 13.5 - 17.5 g/dL    Hematocrit 42.1 41.0 - 52.0 %    MCV 99 80 - 100 fL    MCH 32.6 26.0 - 34.0 pg    MCHC 33.0 32.0 - 36.0 g/dL    RDW 13.2 11.5 - 14.5 %    Platelets 255 150 - 450 x10*3/uL    Neutrophils % 47.5 40.0 - 80.0 %    Immature Granulocytes %, Automated 0.9 0.0 - 0.9 %    Lymphocytes % 35.1 13.0 - 44.0 %    Monocytes % 10.2 2.0 - 10.0 %    Eosinophils % 5.4 0.0 - 6.0 %    Basophils % 0.9 0.0 - 2.0 %    Neutrophils Absolute 2.56 1.20 - 7.70 x10*3/uL    Immature Granulocytes Absolute, Automated 0.05 0.00 - 0.70 x10*3/uL    Lymphocytes Absolute 1.89 1.20 - 4.80 x10*3/uL    " Monocytes Absolute 0.55 0.10 - 1.00 x10*3/uL    Eosinophils Absolute 0.29 0.00 - 0.70 x10*3/uL    Basophils Absolute 0.05 0.00 - 0.10 x10*3/uL   Comprehensive metabolic panel   Result Value Ref Range    Glucose 85 74 - 99 mg/dL    Sodium 137 136 - 145 mmol/L    Potassium 3.9 3.5 - 5.3 mmol/L    Chloride 105 98 - 107 mmol/L    Bicarbonate 23 21 - 32 mmol/L    Anion Gap 13 10 - 20 mmol/L    Urea Nitrogen 15 6 - 23 mg/dL    Creatinine 0.61 0.50 - 1.30 mg/dL    eGFR >90 >60 mL/min/1.73m*2    Calcium 9.3 8.6 - 10.3 mg/dL    Albumin 3.5 3.4 - 5.0 g/dL    Alkaline Phosphatase 53 33 - 120 U/L    Total Protein 6.6 6.4 - 8.2 g/dL    AST 11 9 - 39 U/L    Bilirubin, Total 0.7 0.0 - 1.2 mg/dL    ALT 6 (L) 10 - 52 U/L   Phosphorus   Result Value Ref Range    Phosphorus 2.9 2.5 - 4.9 mg/dL   Basic Metabolic Panel   Result Value Ref Range    Glucose 109 (H) 74 - 99 mg/dL    Sodium 136 136 - 145 mmol/L    Potassium 4.0 3.5 - 5.3 mmol/L    Chloride 105 98 - 107 mmol/L    Bicarbonate 24 21 - 32 mmol/L    Anion Gap 11 10 - 20 mmol/L    Urea Nitrogen 16 6 - 23 mg/dL    Creatinine 0.65 0.50 - 1.30 mg/dL    eGFR >90 >60 mL/min/1.73m*2    Calcium 9.6 8.6 - 10.3 mg/dL   Magnesium   Result Value Ref Range    Magnesium 1.71 1.60 - 2.40 mg/dL              Assessment/Plan   Principal Problem:    Alcohol withdrawal syndrome, with delirium (Multi)  Active Problems:    SOB (shortness of breath)  Sinus tachycardia        Plan patient was transferred from the ICU to telemetry floor, slowly improving, med list and labs reviewed, for now c/w current care.          Cyrus Tapia MD

## 2024-06-30 NOTE — PROGRESS NOTES
"Everton Geiger is a 58 y.o. male on day 4 of admission presenting with Alcohol withdrawal syndrome, with delirium (Multi).    SUBJECTIVE:  Patient is recovering on the medical floor without any active signs of withdrawal.  Patient continued to remain depressed.  He currently is able to acknowledge how he was self-medicating with alcohol for the depression that he has been suffering for all his life.  Patient remain hopeless and worthless.  Patient is currently not safe to be discharged back home and he is agreeable to go to inpatient psychiatry unit when medically stable and cleared    Exam:  Vital Signs: /85 (BP Location: Right arm, Patient Position: Lying)   Pulse 71   Temp 36.6 °C (97.9 °F) (Temporal)   Resp 16   Ht 1.753 m (5' 9.02\")   Wt 77.8 kg (171 lb 8.3 oz)   SpO2 98%   BMI 25.32 kg/m²   Musculoskeletal: Muscle tone: WNL  Gait/Station: normal      Mental Status Exam:  General Appearance: { Psych Appearance:[id number]  Speech: soft  Mood: depressed  Affect: appropriate  Thought Process: Linear, goal directed  Thought Associations: No loosening of associations  Thought Content: suicidal  Perception: No perceptual abnormalities noted  Level of Consciousness: Alert  Orientation: Alert and oriented to person, place, time and situation  Attention and Concentration: Mild impairment in attention  Cognition:  Insight: poor  Judgment: poor     Results for orders placed or performed during the hospital encounter of 06/26/24 (from the past 96 hour(s))   POCT GLUCOSE   Result Value Ref Range    POCT Glucose 84 74 - 99 mg/dL   CBC and Auto Differential   Result Value Ref Range    WBC 8.8 4.4 - 11.3 x10*3/uL    nRBC 0.0 0.0 - 0.0 /100 WBCs    RBC 4.67 4.50 - 5.90 x10*6/uL    Hemoglobin 15.1 13.5 - 17.5 g/dL    Hematocrit 44.5 41.0 - 52.0 %    MCV 95 80 - 100 fL    MCH 32.3 26.0 - 34.0 pg    MCHC 33.9 32.0 - 36.0 g/dL    RDW 13.0 11.5 - 14.5 %    Platelets 406 150 - 450 x10*3/uL    Neutrophils % 57.3 40.0 - " 80.0 %    Immature Granulocytes %, Automated 0.5 0.0 - 0.9 %    Lymphocytes % 36.0 13.0 - 44.0 %    Monocytes % 5.6 2.0 - 10.0 %    Eosinophils % 0.0 0.0 - 6.0 %    Basophils % 0.6 0.0 - 2.0 %    Neutrophils Absolute 5.03 1.20 - 7.70 x10*3/uL    Immature Granulocytes Absolute, Automated 0.04 0.00 - 0.70 x10*3/uL    Lymphocytes Absolute 3.16 1.20 - 4.80 x10*3/uL    Monocytes Absolute 0.49 0.10 - 1.00 x10*3/uL    Eosinophils Absolute 0.00 0.00 - 0.70 x10*3/uL    Basophils Absolute 0.05 0.00 - 0.10 x10*3/uL   Comprehensive Metabolic Panel   Result Value Ref Range    Glucose 93 74 - 99 mg/dL    Sodium 144 136 - 145 mmol/L    Potassium 3.9 3.5 - 5.3 mmol/L    Chloride 104 98 - 107 mmol/L    Bicarbonate 23 21 - 32 mmol/L    Anion Gap 21 (H) 10 - 20 mmol/L    Urea Nitrogen 8 6 - 23 mg/dL    Creatinine 0.73 0.50 - 1.30 mg/dL    eGFR >90 >60 mL/min/1.73m*2    Calcium 9.3 8.6 - 10.3 mg/dL    Albumin 4.3 3.4 - 5.0 g/dL    Alkaline Phosphatase 70 33 - 120 U/L    Total Protein 7.4 6.4 - 8.2 g/dL    AST 17 9 - 39 U/L    Bilirubin, Total 0.4 0.0 - 1.2 mg/dL    ALT 11 10 - 52 U/L   Acute Toxicology Panel, Blood   Result Value Ref Range    Acetaminophen <10.0 10.0 - 30.0 ug/mL    Salicylate  <3 4 - 20 mg/dL    Alcohol 203 (H) <=10 mg/dL   Ammonia   Result Value Ref Range    Ammonia 14 (L) 16 - 53 umol/L   Magnesium   Result Value Ref Range    Magnesium 1.78 1.60 - 2.40 mg/dL   Creatine Kinase   Result Value Ref Range    Creatine Kinase 57 0 - 325 U/L   Lactate   Result Value Ref Range    Lactate 6.6 (HH) 0.4 - 2.0 mmol/L   Blood Culture    Specimen: Peripheral Venipuncture; Blood culture   Result Value Ref Range    Blood Culture No growth at 3 days    Troponin I, High Sensitivity   Result Value Ref Range    Troponin I, High Sensitivity 18 0 - 20 ng/L   BLOOD GAS VENOUS FULL PANEL   Result Value Ref Range    POCT pH, Venous 7.39 7.33 - 7.43 pH    POCT pCO2, Venous 37 (L) 41 - 51 mm Hg    POCT pO2, Venous 54 (H) 35 - 45 mm Hg    POCT  SO2, Venous 84 (H) 45 - 75 %    POCT Oxy Hemoglobin, Venous 82.5 (H) 45.0 - 75.0 %    POCT Hematocrit Calculated, Venous 47.0 41.0 - 52.0 %    POCT Sodium, Venous 141 136 - 145 mmol/L    POCT Potassium, Venous 3.6 3.5 - 5.3 mmol/L    POCT Chloride, Venous 108 (H) 98 - 107 mmol/L    POCT Ionized Calicum, Venous 1.11 1.10 - 1.33 mmol/L    POCT Glucose, Venous 99 74 - 99 mg/dL    POCT Lactate, Venous 6.9 (HH) 0.4 - 2.0 mmol/L    POCT Base Excess, Venous -2.1 (L) -2.0 - 3.0 mmol/L    POCT HCO3 Calculated, Venous 22.4 22.0 - 26.0 mmol/L    POCT Hemoglobin, Venous 15.8 13.5 - 17.5 g/dL    POCT Anion Gap, Venous 14.0 10.0 - 25.0 mmol/L    Patient Temperature      FiO2 21 %    Critical Called By ROCIO     Critical Called To DR CLAROS     Critical Call Time 1441     Critical Read Back Y    Osmolality   Result Value Ref Range    Osmolality, Serum 350 (H) 280 - 300 mOsm/kg   Procalcitonin   Result Value Ref Range    Procalcitonin 0.03 <=0.07 ng/mL   Gamma-Glutamyl Transferase   Result Value Ref Range    GGT 30 5 - 64 U/L   ECG 12 lead   Result Value Ref Range    Ventricular Rate 136 BPM    Atrial Rate 136 BPM    CO Interval 104 ms    QRS Duration 90 ms    QT Interval 376 ms    QTC Calculation(Bazett) 565 ms    R Axis 77 degrees    T Axis 77 degrees    QRS Count 22 beats    Q Onset 220 ms    T Offset 408 ms    QTC Fredericia 493 ms   Blood Culture    Specimen: Peripheral Venipuncture; Blood culture   Result Value Ref Range    Blood Culture No growth at 3 days    Lactate   Result Value Ref Range    Lactate 6.5 (HH) 0.4 - 2.0 mmol/L   Blood Culture    Specimen: Peripheral Venipuncture; Blood culture   Result Value Ref Range    Blood Culture No growth at 3 days    Blood Culture    Specimen: Peripheral Venipuncture; Blood culture   Result Value Ref Range    Blood Culture No growth at 3 days    SST TOP   Result Value Ref Range    Extra Tube Hold for add-ons.    Gray Top   Result Value Ref Range    Extra Tube Hold for add-ons.     Urinalysis with Reflex Culture and Microscopic   Result Value Ref Range    Color, Urine Light-Yellow Light-Yellow, Yellow, Dark-Yellow    Appearance, Urine Clear Clear    Specific Gravity, Urine 1.012 1.005 - 1.035    pH, Urine 6.5 5.0, 5.5, 6.0, 6.5, 7.0, 7.5, 8.0    Protein, Urine NEGATIVE NEGATIVE, 10 (TRACE), 20 (TRACE) mg/dL    Glucose, Urine Normal Normal mg/dL    Blood, Urine NEGATIVE NEGATIVE    Ketones, Urine 10 (1+) (A) NEGATIVE mg/dL    Bilirubin, Urine NEGATIVE NEGATIVE    Urobilinogen, Urine Normal Normal mg/dL    Nitrite, Urine NEGATIVE NEGATIVE    Leukocyte Esterase, Urine NEGATIVE NEGATIVE   Drug Screen, Urine With Reflex to Confirmation   Result Value Ref Range    Amphetamine Screen, Urine Presumptive Negative Presumptive Negative    Barbiturate Screen, Urine Presumptive Negative Presumptive Negative    Benzodiazepines Screen, Urine Presumptive Negative Presumptive Negative    Cannabinoid Screen, Urine Presumptive Negative Presumptive Negative    Cocaine Metabolite Screen, Urine Presumptive Negative Presumptive Negative    Fentanyl Screen, Urine Presumptive Negative Presumptive Negative    Opiate Screen, Urine Presumptive Negative Presumptive Negative    Oxycodone Screen, Urine Presumptive Negative Presumptive Negative    PCP Screen, Urine Presumptive Negative Presumptive Negative    Methadone Screen, Urine Presumptive Negative Presumptive Negative   Urine culture    Specimen: Clean Catch/Voided; Urine   Result Value Ref Range    Urine Culture No significant growth    Legionella Antigen, Urine    Specimen: Urine   Result Value Ref Range    L. pneumophila Urine Ag Negative Negative   Streptococcus pneumoniae Antigen, Urine    Specimen: Urine   Result Value Ref Range    Streptococcus pneumoniae Ag, Urine Negative Negative   Lactate   Result Value Ref Range    Lactate 8.5 (HH) 0.4 - 2.0 mmol/L   POCT GLUCOSE   Result Value Ref Range    POCT Glucose 98 74 - 99 mg/dL   POCT GLUCOSE   Result Value Ref  Range    POCT Glucose 116 (H) 74 - 99 mg/dL   POCT GLUCOSE   Result Value Ref Range    POCT Glucose 110 (H) 74 - 99 mg/dL   Magnesium   Result Value Ref Range    Magnesium 1.10 (L) 1.60 - 2.40 mg/dL   CBC and Auto Differential   Result Value Ref Range    WBC 10.3 4.4 - 11.3 x10*3/uL    nRBC 0.0 0.0 - 0.0 /100 WBCs    RBC 4.11 (L) 4.50 - 5.90 x10*6/uL    Hemoglobin 13.5 13.5 - 17.5 g/dL    Hematocrit 39.1 (L) 41.0 - 52.0 %    MCV 95 80 - 100 fL    MCH 32.8 26.0 - 34.0 pg    MCHC 34.5 32.0 - 36.0 g/dL    RDW 12.9 11.5 - 14.5 %    Platelets 295 150 - 450 x10*3/uL    Neutrophils % 72.8 40.0 - 80.0 %    Immature Granulocytes %, Automated 0.4 0.0 - 0.9 %    Lymphocytes % 15.3 13.0 - 44.0 %    Monocytes % 11.0 2.0 - 10.0 %    Eosinophils % 0.1 0.0 - 6.0 %    Basophils % 0.4 0.0 - 2.0 %    Neutrophils Absolute 7.53 1.20 - 7.70 x10*3/uL    Immature Granulocytes Absolute, Automated 0.04 0.00 - 0.70 x10*3/uL    Lymphocytes Absolute 1.58 1.20 - 4.80 x10*3/uL    Monocytes Absolute 1.14 (H) 0.10 - 1.00 x10*3/uL    Eosinophils Absolute 0.01 0.00 - 0.70 x10*3/uL    Basophils Absolute 0.04 0.00 - 0.10 x10*3/uL   Comprehensive metabolic panel   Result Value Ref Range    Glucose 115 (H) 74 - 99 mg/dL    Sodium 136 136 - 145 mmol/L    Potassium 3.7 3.5 - 5.3 mmol/L    Chloride 99 98 - 107 mmol/L    Bicarbonate 26 21 - 32 mmol/L    Anion Gap 15 10 - 20 mmol/L    Urea Nitrogen 5 (L) 6 - 23 mg/dL    Creatinine 0.64 0.50 - 1.30 mg/dL    eGFR >90 >60 mL/min/1.73m*2    Calcium 8.3 (L) 8.6 - 10.3 mg/dL    Albumin 3.4 3.4 - 5.0 g/dL    Alkaline Phosphatase 55 33 - 120 U/L    Total Protein 6.3 (L) 6.4 - 8.2 g/dL    AST 16 9 - 39 U/L    Bilirubin, Total 0.9 0.0 - 1.2 mg/dL    ALT 9 (L) 10 - 52 U/L   Phosphorus   Result Value Ref Range    Phosphorus 3.4 2.5 - 4.9 mg/dL   Lactate   Result Value Ref Range    Lactate 3.3 (H) 0.4 - 2.0 mmol/L   Creatine Kinase   Result Value Ref Range    Creatine Kinase 468 (H) 0 - 325 U/L   Lactate   Result Value  Ref Range    Lactate 3.0 (H) 0.4 - 2.0 mmol/L   POCT GLUCOSE   Result Value Ref Range    POCT Glucose 119 (H) 74 - 99 mg/dL   B-type natriuretic peptide   Result Value Ref Range     (H) 0 - 99 pg/mL   Blood Gas Arterial Full Panel   Result Value Ref Range    POCT pH, Arterial 7.51 (H) 7.38 - 7.42 pH    POCT pCO2, Arterial 33 (L) 38 - 42 mm Hg    POCT pO2, Arterial 141 (H) 85 - 95 mm Hg    POCT SO2, Arterial 100 94 - 100 %    POCT Oxy Hemoglobin, Arterial 96.8 94.0 - 98.0 %    POCT Hematocrit Calculated, Arterial 43.0 41.0 - 52.0 %    POCT Sodium, Arterial 131 (L) 136 - 145 mmol/L    POCT Potassium, Arterial 3.9 3.5 - 5.3 mmol/L    POCT Chloride, Arterial 99 98 - 107 mmol/L    POCT Ionized Calcium, Arterial 1.15 1.10 - 1.33 mmol/L    POCT Glucose, Arterial 133 (H) 74 - 99 mg/dL    POCT Lactate, Arterial 2.2 (H) 0.4 - 2.0 mmol/L    POCT Base Excess, Arterial 3.7 (H) -2.0 - 3.0 mmol/L    POCT HCO3 Calculated, Arterial 26.3 (H) 22.0 - 26.0 mmol/L    POCT Hemoglobin, Arterial 14.4 13.5 - 17.5 g/dL    POCT Anion Gap, Arterial 10 10 - 25 mmo/L    Patient Temperature      FiO2 50 %    Apparatus VENTI MASK    POCT GLUCOSE   Result Value Ref Range    POCT Glucose 133 (H) 74 - 99 mg/dL   Transthoracic Echo (TTE) Complete   Result Value Ref Range    AV pk phuc 1.16 m/s    LV Biplane EF 54 %    LVOT diam 2.20 cm    MV E/A ratio 0.76     MV avg E/e' ratio 9.80     Tricuspid annular plane systolic excursion 1.7 cm    LV EF 54 %    RVSP 21.7 mmHg    LVIDd 3.91 cm    Aortic Valve Area by Continuity of Peak Velocity 3.38 cm2    AV pk grad 5.4 mmHg    LV A4C EF 51.7    POCT GLUCOSE   Result Value Ref Range    POCT Glucose 112 (H) 74 - 99 mg/dL   Renal function panel   Result Value Ref Range    Glucose 96 74 - 99 mg/dL    Sodium 133 (L) 136 - 145 mmol/L    Potassium 3.5 3.5 - 5.3 mmol/L    Chloride 96 (L) 98 - 107 mmol/L    Bicarbonate 28 21 - 32 mmol/L    Anion Gap 13 10 - 20 mmol/L    Urea Nitrogen 6 6 - 23 mg/dL    Creatinine  0.59 0.50 - 1.30 mg/dL    eGFR >90 >60 mL/min/1.73m*2    Calcium 8.5 (L) 8.6 - 10.3 mg/dL    Phosphorus 2.5 2.5 - 4.9 mg/dL    Albumin 3.5 3.4 - 5.0 g/dL   Magnesium   Result Value Ref Range    Magnesium 2.15 1.60 - 2.40 mg/dL   POCT GLUCOSE   Result Value Ref Range    POCT Glucose 112 (H) 74 - 99 mg/dL   POCT GLUCOSE   Result Value Ref Range    POCT Glucose 118 (H) 74 - 99 mg/dL   Magnesium   Result Value Ref Range    Magnesium 2.06 1.60 - 2.40 mg/dL   CBC and Auto Differential   Result Value Ref Range    WBC 8.2 4.4 - 11.3 x10*3/uL    nRBC 0.0 0.0 - 0.0 /100 WBCs    RBC 4.20 (L) 4.50 - 5.90 x10*6/uL    Hemoglobin 13.9 13.5 - 17.5 g/dL    Hematocrit 40.2 (L) 41.0 - 52.0 %    MCV 96 80 - 100 fL    MCH 33.1 26.0 - 34.0 pg    MCHC 34.6 32.0 - 36.0 g/dL    RDW 12.8 11.5 - 14.5 %    Platelets 248 150 - 450 x10*3/uL    Neutrophils % 61.5 40.0 - 80.0 %    Immature Granulocytes %, Automated 0.4 0.0 - 0.9 %    Lymphocytes % 25.4 13.0 - 44.0 %    Monocytes % 9.5 2.0 - 10.0 %    Eosinophils % 2.7 0.0 - 6.0 %    Basophils % 0.5 0.0 - 2.0 %    Neutrophils Absolute 5.07 1.20 - 7.70 x10*3/uL    Immature Granulocytes Absolute, Automated 0.03 0.00 - 0.70 x10*3/uL    Lymphocytes Absolute 2.09 1.20 - 4.80 x10*3/uL    Monocytes Absolute 0.78 0.10 - 1.00 x10*3/uL    Eosinophils Absolute 0.22 0.00 - 0.70 x10*3/uL    Basophils Absolute 0.04 0.00 - 0.10 x10*3/uL   Comprehensive metabolic panel   Result Value Ref Range    Glucose 107 (H) 74 - 99 mg/dL    Sodium 134 (L) 136 - 145 mmol/L    Potassium 3.7 3.5 - 5.3 mmol/L    Chloride 100 98 - 107 mmol/L    Bicarbonate 26 21 - 32 mmol/L    Anion Gap 12 10 - 20 mmol/L    Urea Nitrogen 7 6 - 23 mg/dL    Creatinine 0.71 0.50 - 1.30 mg/dL    eGFR >90 >60 mL/min/1.73m*2    Calcium 8.6 8.6 - 10.3 mg/dL    Albumin 3.6 3.4 - 5.0 g/dL    Alkaline Phosphatase 57 33 - 120 U/L    Total Protein 6.5 6.4 - 8.2 g/dL    AST 12 9 - 39 U/L    Bilirubin, Total 1.1 0.0 - 1.2 mg/dL    ALT 7 (L) 10 - 52 U/L    Phosphorus   Result Value Ref Range    Phosphorus 3.0 2.5 - 4.9 mg/dL   Creatine Kinase   Result Value Ref Range    Creatine Kinase 203 0 - 325 U/L   Lactate   Result Value Ref Range    Lactate 1.0 0.4 - 2.0 mmol/L   TSH with reflex to Free T4 if abnormal   Result Value Ref Range    Thyroid Stimulating Hormone 2.00 0.44 - 3.98 mIU/L   POCT GLUCOSE   Result Value Ref Range    POCT Glucose 102 (H) 74 - 99 mg/dL   Magnesium   Result Value Ref Range    Magnesium 1.79 1.60 - 2.40 mg/dL   CBC and Auto Differential   Result Value Ref Range    WBC 5.4 4.4 - 11.3 x10*3/uL    nRBC 0.0 0.0 - 0.0 /100 WBCs    RBC 4.26 (L) 4.50 - 5.90 x10*6/uL    Hemoglobin 13.9 13.5 - 17.5 g/dL    Hematocrit 42.1 41.0 - 52.0 %    MCV 99 80 - 100 fL    MCH 32.6 26.0 - 34.0 pg    MCHC 33.0 32.0 - 36.0 g/dL    RDW 13.2 11.5 - 14.5 %    Platelets 255 150 - 450 x10*3/uL    Neutrophils % 47.5 40.0 - 80.0 %    Immature Granulocytes %, Automated 0.9 0.0 - 0.9 %    Lymphocytes % 35.1 13.0 - 44.0 %    Monocytes % 10.2 2.0 - 10.0 %    Eosinophils % 5.4 0.0 - 6.0 %    Basophils % 0.9 0.0 - 2.0 %    Neutrophils Absolute 2.56 1.20 - 7.70 x10*3/uL    Immature Granulocytes Absolute, Automated 0.05 0.00 - 0.70 x10*3/uL    Lymphocytes Absolute 1.89 1.20 - 4.80 x10*3/uL    Monocytes Absolute 0.55 0.10 - 1.00 x10*3/uL    Eosinophils Absolute 0.29 0.00 - 0.70 x10*3/uL    Basophils Absolute 0.05 0.00 - 0.10 x10*3/uL   Comprehensive metabolic panel   Result Value Ref Range    Glucose 85 74 - 99 mg/dL    Sodium 137 136 - 145 mmol/L    Potassium 3.9 3.5 - 5.3 mmol/L    Chloride 105 98 - 107 mmol/L    Bicarbonate 23 21 - 32 mmol/L    Anion Gap 13 10 - 20 mmol/L    Urea Nitrogen 15 6 - 23 mg/dL    Creatinine 0.61 0.50 - 1.30 mg/dL    eGFR >90 >60 mL/min/1.73m*2    Calcium 9.3 8.6 - 10.3 mg/dL    Albumin 3.5 3.4 - 5.0 g/dL    Alkaline Phosphatase 53 33 - 120 U/L    Total Protein 6.6 6.4 - 8.2 g/dL    AST 11 9 - 39 U/L    Bilirubin, Total 0.7 0.0 - 1.2 mg/dL    ALT 6 (L)  10 - 52 U/L   Phosphorus   Result Value Ref Range    Phosphorus 2.9 2.5 - 4.9 mg/dL   Basic Metabolic Panel   Result Value Ref Range    Glucose 109 (H) 74 - 99 mg/dL    Sodium 136 136 - 145 mmol/L    Potassium 4.0 3.5 - 5.3 mmol/L    Chloride 105 98 - 107 mmol/L    Bicarbonate 24 21 - 32 mmol/L    Anion Gap 11 10 - 20 mmol/L    Urea Nitrogen 16 6 - 23 mg/dL    Creatinine 0.65 0.50 - 1.30 mg/dL    eGFR >90 >60 mL/min/1.73m*2    Calcium 9.6 8.6 - 10.3 mg/dL   Magnesium   Result Value Ref Range    Magnesium 1.71 1.60 - 2.40 mg/dL   Magnesium   Result Value Ref Range    Magnesium 1.61 1.60 - 2.40 mg/dL   CBC and Auto Differential   Result Value Ref Range    WBC 7.0 4.4 - 11.3 x10*3/uL    nRBC 0.0 0.0 - 0.0 /100 WBCs    RBC 4.12 (L) 4.50 - 5.90 x10*6/uL    Hemoglobin 13.4 (L) 13.5 - 17.5 g/dL    Hematocrit 40.5 (L) 41.0 - 52.0 %    MCV 98 80 - 100 fL    MCH 32.5 26.0 - 34.0 pg    MCHC 33.1 32.0 - 36.0 g/dL    RDW 13.2 11.5 - 14.5 %    Platelets 263 150 - 450 x10*3/uL    Neutrophils % 53.1 40.0 - 80.0 %    Immature Granulocytes %, Automated 0.7 0.0 - 0.9 %    Lymphocytes % 31.7 13.0 - 44.0 %    Monocytes % 10.3 2.0 - 10.0 %    Eosinophils % 3.3 0.0 - 6.0 %    Basophils % 0.9 0.0 - 2.0 %    Neutrophils Absolute 3.73 1.20 - 7.70 x10*3/uL    Immature Granulocytes Absolute, Automated 0.05 0.00 - 0.70 x10*3/uL    Lymphocytes Absolute 2.22 1.20 - 4.80 x10*3/uL    Monocytes Absolute 0.72 0.10 - 1.00 x10*3/uL    Eosinophils Absolute 0.23 0.00 - 0.70 x10*3/uL    Basophils Absolute 0.06 0.00 - 0.10 x10*3/uL   Comprehensive metabolic panel   Result Value Ref Range    Glucose 97 74 - 99 mg/dL    Sodium 138 136 - 145 mmol/L    Potassium 3.9 3.5 - 5.3 mmol/L    Chloride 108 (H) 98 - 107 mmol/L    Bicarbonate 22 21 - 32 mmol/L    Anion Gap 12 10 - 20 mmol/L    Urea Nitrogen 22 6 - 23 mg/dL    Creatinine 0.64 0.50 - 1.30 mg/dL    eGFR >90 >60 mL/min/1.73m*2    Calcium 9.4 8.6 - 10.3 mg/dL    Albumin 3.4 3.4 - 5.0 g/dL    Alkaline  Phosphatase 51 33 - 120 U/L    Total Protein 6.4 6.4 - 8.2 g/dL    AST 11 9 - 39 U/L    Bilirubin, Total 0.4 0.0 - 1.2 mg/dL    ALT 9 (L) 10 - 52 U/L   Phosphorus   Result Value Ref Range    Phosphorus 4.5 2.5 - 4.9 mg/dL         Risk Assessment:  High risk of alcohol relapse and suicide    Impression:  MDD recurrent severe   Alcohol use disorder    Recommendations:    1. Safety:  Maintain sitter     2.  Recommend transfer to inpatient psychiatry unit when medically stable.  [facility] to be contacted.  Pink slip in the chart(accepting hospital name is to be added)    Thank you for allowing us to participate in the care of this patient.       Bora Lama MD  6/30/2024  10:58 AM

## 2024-06-30 NOTE — PROGRESS NOTES
Everton Geiger is a 58 y.o. male on day 4 of admission presenting with Alcohol withdrawal syndrome, with delirium (Multi).    Subjective   No cp/SOB       Objective         Current Facility-Administered Medications:     doxycycline (Vibramycin) capsule 100 mg, 100 mg, oral, q12h ALINE, Alton Griffith PA-C, 100 mg at 06/30/24 0853    enoxaparin (Lovenox) syringe 40 mg, 40 mg, subcutaneous, Daily, Alton Griffith PA-C, 40 mg at 06/30/24 0852    folic acid (Folvite) tablet 1 mg, 1 mg, oral, Daily, Alton Griffith PA-C, 1 mg at 06/30/24 0853    LORazepam (Ativan) injection 0.5 mg, 0.5 mg, intravenous, q2h PRN, 0.5 mg at 06/29/24 1605 **OR** LORazepam (Ativan) injection 1 mg, 1 mg, intravenous, q2h PRN **OR** LORazepam (Ativan) injection 2 mg, 2 mg, intravenous, q2h PRN, Alton Griffith PA-C, 2 mg at 06/28/24 0407    metoprolol succinate XL (Toprol-XL) 24 hr tablet 25 mg, 25 mg, oral, Daily, Juanita Alberto MD, 25 mg at 06/30/24 0853    multivitamin with minerals 1 tablet, 1 tablet, oral, Daily, Alton Griffith PA-C, 1 tablet at 06/30/24 0853    ondansetron ODT (Zofran-ODT) disintegrating tablet 4 mg, 4 mg, oral, q8h PRN **OR** ondansetron (Zofran) injection 4 mg, 4 mg, intravenous, q8h PRN, Delores Lovell, APRN-CNP    perflutren lipid microspheres (Definity) injection 0.5-10 mL of dilution, 0.5-10 mL of dilution, intravenous, Once in imaging, Alton Griffith PA-C    perflutren protein A microsphere (Optison) injection 0.5 mL, 0.5 mL, intravenous, Once in imaging, Alton Griffith PA-C    QUEtiapine (SEROquel) tablet 25 mg, 25 mg, oral, Nightly, Alton Griffith PA-C, 25 mg at 06/29/24 2045    sertraline (Zoloft) tablet 50 mg, 50 mg, oral, Nightly, Alton Griffith PA-C, 50 mg at 06/29/24 2045    sulfur hexafluoride microsphr (Lumason) injection 24.28 mg, 2 mL, intravenous, Once in imaging, Alton Griffith PA-C    thiamine (Vitamin B-1) tablet 100 mg, 100 mg, oral, Daily, Alton Griffith PA-C, 100  "mg at 06/30/24 0853       Physical Exam  HENT:      Head: Normocephalic.      Mouth/Throat:      Pharynx: Oropharynx is clear.   Eyes:      Conjunctiva/sclera: Conjunctivae normal.   Cardiovascular:      Rate and Rhythm: Regular rhythm.   Pulmonary:      Breath sounds: Normal breath sounds.   Abdominal:      General: Bowel sounds are normal.      Palpations: Abdomen is soft.   Musculoskeletal:         General: Normal range of motion.   Skin:     General: Skin is warm and dry.   Neurological:      General: No focal deficit present.      Mental Status: He is alert.   Psychiatric:         Behavior: Behavior normal.           Last Recorded Vitals  Blood pressure 112/87, pulse 71, temperature 36.9 °C (98.4 °F), temperature source Temporal, resp. rate 17, height 1.753 m (5' 9.02\"), weight 77.8 kg (171 lb 8.3 oz), SpO2 97%.  Intake/Output last 3 Shifts:  I/O last 3 completed shifts:  In: 400 (5.1 mL/kg) [P.O.:400]  Out: 320 (4.1 mL/kg) [Urine:320 (0.1 mL/kg/hr)]  Weight: 77.8 kg     Labs:       Results for orders placed or performed during the hospital encounter of 06/26/24 (from the past 24 hour(s))   Magnesium   Result Value Ref Range    Magnesium 1.61 1.60 - 2.40 mg/dL   CBC and Auto Differential   Result Value Ref Range    WBC 7.0 4.4 - 11.3 x10*3/uL    nRBC 0.0 0.0 - 0.0 /100 WBCs    RBC 4.12 (L) 4.50 - 5.90 x10*6/uL    Hemoglobin 13.4 (L) 13.5 - 17.5 g/dL    Hematocrit 40.5 (L) 41.0 - 52.0 %    MCV 98 80 - 100 fL    MCH 32.5 26.0 - 34.0 pg    MCHC 33.1 32.0 - 36.0 g/dL    RDW 13.2 11.5 - 14.5 %    Platelets 263 150 - 450 x10*3/uL    Neutrophils % 53.1 40.0 - 80.0 %    Immature Granulocytes %, Automated 0.7 0.0 - 0.9 %    Lymphocytes % 31.7 13.0 - 44.0 %    Monocytes % 10.3 2.0 - 10.0 %    Eosinophils % 3.3 0.0 - 6.0 %    Basophils % 0.9 0.0 - 2.0 %    Neutrophils Absolute 3.73 1.20 - 7.70 x10*3/uL    Immature Granulocytes Absolute, Automated 0.05 0.00 - 0.70 x10*3/uL    Lymphocytes Absolute 2.22 1.20 - 4.80 x10*3/uL "    Monocytes Absolute 0.72 0.10 - 1.00 x10*3/uL    Eosinophils Absolute 0.23 0.00 - 0.70 x10*3/uL    Basophils Absolute 0.06 0.00 - 0.10 x10*3/uL   Comprehensive metabolic panel   Result Value Ref Range    Glucose 97 74 - 99 mg/dL    Sodium 138 136 - 145 mmol/L    Potassium 3.9 3.5 - 5.3 mmol/L    Chloride 108 (H) 98 - 107 mmol/L    Bicarbonate 22 21 - 32 mmol/L    Anion Gap 12 10 - 20 mmol/L    Urea Nitrogen 22 6 - 23 mg/dL    Creatinine 0.64 0.50 - 1.30 mg/dL    eGFR >90 >60 mL/min/1.73m*2    Calcium 9.4 8.6 - 10.3 mg/dL    Albumin 3.4 3.4 - 5.0 g/dL    Alkaline Phosphatase 51 33 - 120 U/L    Total Protein 6.4 6.4 - 8.2 g/dL    AST 11 9 - 39 U/L    Bilirubin, Total 0.4 0.0 - 1.2 mg/dL    ALT 9 (L) 10 - 52 U/L   Phosphorus   Result Value Ref Range    Phosphorus 4.5 2.5 - 4.9 mg/dL              Assessment/Plan   Principal Problem:    Alcohol withdrawal syndrome, with delirium (Multi)  Active Problems:    SOB (shortness of breath)  Urinary retention        PLAN: Pt is slowly improving, med list and labs reviewed for now c/w current Rx, discontinue Lange, trial of voiding, d/w CNP, if stable consider for dc to in pt psych whitten in AM.              Cyrus Tapia MD

## 2024-06-30 NOTE — NURSING NOTE
EOS:  Slept well through the night with CIWA scores zero.  Ambulated to  a few times using walker although allen catheter remains in.  No complaints.

## 2024-06-30 NOTE — PROGRESS NOTES
Occupational Therapy    OT Treatment    Patient Name: Everton Geiger  MRN: [id number]  Today's Date: 6/30/2024  Time Calculation  Start Time: 1427  Stop Time: 1439  Time Calculation (min): 12 min       3033/3033-A    Assessment:  End of Session Communication: PCT/NA/CTA (1:1 sitter present)  End of Session Patient Position: Bed, 3 rail up, Alarm off, caregiver present (pt declined to sit up in recliner at this time)       Plan:  OT Frequency: 5 times per week  OT Discharge Recommendations: High intensity level of continued care     Subjective      06/30/24 1427   OT Last Visit   OT Received On 06/30/24   General   Family/Caregiver Present Yes   Caregiver Feedback 1:1 sitter present   Prior to Session Communication Bedside nurse   Patient Position Received Bed, 3 rail up;Alarm off, caregiver present   General Comment Pt agreeable to tx and cleared for participation.   Precautions   Medical Precautions Fall precautions;Seizure precautions   Precautions Comment SI precautions   Pain Assessment   0-10 (Numeric) Pain Score 0 - No pain   Cognition   Overall Cognitive Status WFL   Orientation Level Oriented X4   Grooming   Grooming Level of Assistance Close supervision;Contact guard   Grooming Where Assessed Standing sinkside   Grooming Comments Pt completed various grooming tasks (oral hygiene, face washing, hair washing with shampoo cap/drying/combing) in stance at sink without UE support, SBA to occasional CGA only for safety due to forward flexion and eyes close while washing face however no LOB noted.   Bed Mobility 1   Bed Mobility 1 Supine to sitting;Sitting to supine   Level of Assistance 1 Distant supervision   Bed Mobility Comments 1 HOB minimally elevated   Transfer 1   Technique 1 Sit to stand;Stand to sit   Transfer Level of Assistance 1 Close supervision   Trials/Comments 1 STS functional transfers from EOB level with SBA; pt ambulated to and from sink with CGA<>SBA without AD, fairly steady with no LOB observed    Therapeutic Activity   Therapeutic Activity 1 Educated in and encouraged B UE therex/AROM from bed or chair level with verbal understanding   IP OT Assessment   End of Session Communication PCT/NA/CTA  (1:1 sitter present)   End of Session Patient Position Bed, 3 rail up;Alarm off, caregiver present  (pt declined to sit up in recliner at this time)   Inpatient Plan   OT Frequency 5 times per week   OT Discharge Recommendations High intensity level of continued care     Outcome Measures:[facility] Daily Activity  Putting on and taking off regular lower body clothing: A little  Bathing (including washing, rinsing, drying): A little  Putting on and taking off regular upper body clothing: A little  Toileting, which includes using toilet, bedpan or urinal: A little  Taking care of personal grooming such as brushing teeth: A little  Eating Meals: None  Daily Activity - Total Score: 19  Education Documentation  No documentation found.  Education Comments  No comments found.            Goals:  Encounter Problems       Encounter Problems (Active)       OT Goals       Patient will complete functional transfers with mod I. (Progressing)       Start:  06/28/24    Expected End:  07/12/24            Patient will complete with toileting with mod I. (Progressing)       Start:  06/28/24    Expected End:  07/12/24            Patient will complete with LE dressing with mod I. (Progressing)       Start:  06/28/24    Expected End:  07/12/24

## 2024-07-01 LAB
ALBUMIN SERPL BCP-MCNC: 3.3 G/DL (ref 3.4–5)
ALP SERPL-CCNC: 46 U/L (ref 33–120)
ALT SERPL W P-5'-P-CCNC: 8 U/L (ref 10–52)
ANION GAP SERPL CALC-SCNC: 11 MMOL/L (ref 10–20)
AST SERPL W P-5'-P-CCNC: 10 U/L (ref 9–39)
BACTERIA BLD CULT: NORMAL
BASOPHILS # BLD AUTO: 0.05 X10*3/UL (ref 0–0.1)
BASOPHILS NFR BLD AUTO: 0.9 %
BILIRUB SERPL-MCNC: 0.5 MG/DL (ref 0–1.2)
BUN SERPL-MCNC: 15 MG/DL (ref 6–23)
CALCIUM SERPL-MCNC: 8.8 MG/DL (ref 8.6–10.3)
CHLORIDE SERPL-SCNC: 107 MMOL/L (ref 98–107)
CO2 SERPL-SCNC: 23 MMOL/L (ref 21–32)
CREAT SERPL-MCNC: 0.6 MG/DL (ref 0.5–1.3)
EGFRCR SERPLBLD CKD-EPI 2021: >90 ML/MIN/1.73M*2
EOSINOPHIL # BLD AUTO: 0.25 X10*3/UL (ref 0–0.7)
EOSINOPHIL NFR BLD AUTO: 4.7 %
ERYTHROCYTE [DISTWIDTH] IN BLOOD BY AUTOMATED COUNT: 13.2 % (ref 11.5–14.5)
GLUCOSE SERPL-MCNC: 87 MG/DL (ref 74–99)
HCT VFR BLD AUTO: 39.6 % (ref 41–52)
HGB BLD-MCNC: 13.2 G/DL (ref 13.5–17.5)
IMM GRANULOCYTES # BLD AUTO: 0.04 X10*3/UL (ref 0–0.7)
IMM GRANULOCYTES NFR BLD AUTO: 0.8 % (ref 0–0.9)
LYMPHOCYTES # BLD AUTO: 2.37 X10*3/UL (ref 1.2–4.8)
LYMPHOCYTES NFR BLD AUTO: 44.9 %
MAGNESIUM SERPL-MCNC: 1.6 MG/DL (ref 1.6–2.4)
MCH RBC QN AUTO: 32.2 PG (ref 26–34)
MCHC RBC AUTO-ENTMCNC: 33.3 G/DL (ref 32–36)
MCV RBC AUTO: 97 FL (ref 80–100)
MONOCYTES # BLD AUTO: 0.53 X10*3/UL (ref 0.1–1)
MONOCYTES NFR BLD AUTO: 10 %
NEUTROPHILS # BLD AUTO: 2.04 X10*3/UL (ref 1.2–7.7)
NEUTROPHILS NFR BLD AUTO: 38.7 %
NRBC BLD-RTO: 0 /100 WBCS (ref 0–0)
PHOSPHATE SERPL-MCNC: 4.4 MG/DL (ref 2.5–4.9)
PLATELET # BLD AUTO: 255 X10*3/UL (ref 150–450)
POTASSIUM SERPL-SCNC: 3.5 MMOL/L (ref 3.5–5.3)
PROT SERPL-MCNC: 6.1 G/DL (ref 6.4–8.2)
RBC # BLD AUTO: 4.1 X10*6/UL (ref 4.5–5.9)
SODIUM SERPL-SCNC: 137 MMOL/L (ref 136–145)
WBC # BLD AUTO: 5.3 X10*3/UL (ref 4.4–11.3)

## 2024-07-01 PROCEDURE — 2500000001 HC RX 250 WO HCPCS SELF ADMINISTERED DRUGS (ALT 637 FOR MEDICARE OP): Performed by: INTERNAL MEDICINE

## 2024-07-01 PROCEDURE — 97110 THERAPEUTIC EXERCISES: CPT | Mod: GP,CQ

## 2024-07-01 PROCEDURE — 97116 GAIT TRAINING THERAPY: CPT | Mod: GP,CQ

## 2024-07-01 PROCEDURE — 85025 COMPLETE CBC W/AUTO DIFF WBC: CPT | Performed by: PHYSICIAN ASSISTANT

## 2024-07-01 PROCEDURE — 2500000001 HC RX 250 WO HCPCS SELF ADMINISTERED DRUGS (ALT 637 FOR MEDICARE OP): Performed by: PHYSICIAN ASSISTANT

## 2024-07-01 PROCEDURE — 2500000002 HC RX 250 W HCPCS SELF ADMINISTERED DRUGS (ALT 637 FOR MEDICARE OP, ALT 636 FOR OP/ED): Performed by: PHYSICIAN ASSISTANT

## 2024-07-01 PROCEDURE — 1100000001 HC PRIVATE ROOM DAILY

## 2024-07-01 PROCEDURE — 84075 ASSAY ALKALINE PHOSPHATASE: CPT | Performed by: PHYSICIAN ASSISTANT

## 2024-07-01 PROCEDURE — 84100 ASSAY OF PHOSPHORUS: CPT | Performed by: PHYSICIAN ASSISTANT

## 2024-07-01 PROCEDURE — 2500000004 HC RX 250 GENERAL PHARMACY W/ HCPCS (ALT 636 FOR OP/ED): Performed by: PHYSICIAN ASSISTANT

## 2024-07-01 PROCEDURE — 2500000004 HC RX 250 GENERAL PHARMACY W/ HCPCS (ALT 636 FOR OP/ED): Performed by: NURSE PRACTITIONER

## 2024-07-01 PROCEDURE — 2500000002 HC RX 250 W HCPCS SELF ADMINISTERED DRUGS (ALT 637 FOR MEDICARE OP, ALT 636 FOR OP/ED): Performed by: NURSE PRACTITIONER

## 2024-07-01 PROCEDURE — 83735 ASSAY OF MAGNESIUM: CPT | Performed by: PHYSICIAN ASSISTANT

## 2024-07-01 PROCEDURE — 36415 COLL VENOUS BLD VENIPUNCTURE: CPT | Performed by: PHYSICIAN ASSISTANT

## 2024-07-01 RX ORDER — LANOLIN ALCOHOL/MO/W.PET/CERES
400 CREAM (GRAM) TOPICAL ONCE
Status: COMPLETED | OUTPATIENT
Start: 2024-07-01 | End: 2024-07-01

## 2024-07-01 RX ORDER — POTASSIUM CHLORIDE 20 MEQ/1
20 TABLET, EXTENDED RELEASE ORAL ONCE
Status: COMPLETED | OUTPATIENT
Start: 2024-07-01 | End: 2024-07-01

## 2024-07-01 RX ORDER — LANOLIN ALCOHOL/MO/W.PET/CERES
100 CREAM (GRAM) TOPICAL DAILY
Qty: 30 TABLET | Refills: 0 | Status: SHIPPED | OUTPATIENT
Start: 2024-07-02 | End: 2024-07-09 | Stop reason: HOSPADM

## 2024-07-01 RX ORDER — FOLIC ACID 1 MG/1
1 TABLET ORAL DAILY
Qty: 30 TABLET | Refills: 0 | Status: SHIPPED | OUTPATIENT
Start: 2024-07-02 | End: 2024-07-09 | Stop reason: HOSPADM

## 2024-07-01 ASSESSMENT — LIFESTYLE VARIABLES
HEADACHE, FULLNESS IN HEAD: NOT PRESENT
AGITATION: NORMAL ACTIVITY
PAROXYSMAL SWEATS: NO SWEAT VISIBLE
AGITATION: NORMAL ACTIVITY
HEADACHE, FULLNESS IN HEAD: NOT PRESENT
AGITATION: NORMAL ACTIVITY
TOTAL SCORE: 0
ORIENTATION AND CLOUDING OF SENSORIUM: ORIENTED AND CAN DO SERIAL ADDITIONS
AUDITORY DISTURBANCES: NOT PRESENT
NAUSEA AND VOMITING: NO NAUSEA AND NO VOMITING
NAUSEA AND VOMITING: NO NAUSEA AND NO VOMITING
ANXIETY: NO ANXIETY, AT EASE
VISUAL DISTURBANCES: NOT PRESENT
TREMOR: NO TREMOR
ORIENTATION AND CLOUDING OF SENSORIUM: ORIENTED AND CAN DO SERIAL ADDITIONS
ANXIETY: NO ANXIETY, AT EASE
ANXIETY: NO ANXIETY, AT EASE
AUDITORY DISTURBANCES: NOT PRESENT
ORIENTATION AND CLOUDING OF SENSORIUM: ORIENTED AND CAN DO SERIAL ADDITIONS
AGITATION: NORMAL ACTIVITY
TOTAL SCORE: 0
NAUSEA AND VOMITING: NO NAUSEA AND NO VOMITING
TOTAL SCORE: 0
NAUSEA AND VOMITING: NO NAUSEA AND NO VOMITING
VISUAL DISTURBANCES: NOT PRESENT
HEADACHE, FULLNESS IN HEAD: NOT PRESENT
PAROXYSMAL SWEATS: NO SWEAT VISIBLE
VISUAL DISTURBANCES: NOT PRESENT
TREMOR: NO TREMOR
TOTAL SCORE: 0
PAROXYSMAL SWEATS: NO SWEAT VISIBLE
VISUAL DISTURBANCES: NOT PRESENT
TREMOR: NO TREMOR
AUDITORY DISTURBANCES: NOT PRESENT
HEADACHE, FULLNESS IN HEAD: NOT PRESENT
NAUSEA AND VOMITING: NO NAUSEA AND NO VOMITING
ORIENTATION AND CLOUDING OF SENSORIUM: ORIENTED AND CAN DO SERIAL ADDITIONS
PAROXYSMAL SWEATS: NO SWEAT VISIBLE
HEADACHE, FULLNESS IN HEAD: NOT PRESENT
PAROXYSMAL SWEATS: NO SWEAT VISIBLE
ANXIETY: NO ANXIETY, AT EASE
TREMOR: NO TREMOR
TOTAL SCORE: 0
ORIENTATION AND CLOUDING OF SENSORIUM: ORIENTED AND CAN DO SERIAL ADDITIONS
AUDITORY DISTURBANCES: NOT PRESENT
VISUAL DISTURBANCES: NOT PRESENT
TREMOR: NO TREMOR
ANXIETY: NO ANXIETY, AT EASE
AGITATION: NORMAL ACTIVITY
AUDITORY DISTURBANCES: NOT PRESENT

## 2024-07-01 ASSESSMENT — COGNITIVE AND FUNCTIONAL STATUS - GENERAL
WALKING IN HOSPITAL ROOM: A LITTLE
CLIMB 3 TO 5 STEPS WITH RAILING: A LITTLE
DRESSING REGULAR UPPER BODY CLOTHING: A LITTLE
DAILY ACTIVITIY SCORE: 21
CLIMB 3 TO 5 STEPS WITH RAILING: A LITTLE
WALKING IN HOSPITAL ROOM: A LITTLE
MOBILITY SCORE: 22
DRESSING REGULAR LOWER BODY CLOTHING: A LITTLE
HELP NEEDED FOR BATHING: A LITTLE
MOBILITY SCORE: 22

## 2024-07-01 ASSESSMENT — COLUMBIA-SUICIDE SEVERITY RATING SCALE - C-SSRS
5. HAVE YOU STARTED TO WORK OUT OR WORKED OUT THE DETAILS OF HOW TO KILL YOURSELF? DO YOU INTEND TO CARRY OUT THIS PLAN?: NO
1. SINCE LAST CONTACT, HAVE YOU WISHED YOU WERE DEAD OR WISHED YOU COULD GO TO SLEEP AND NOT WAKE UP?: NO
6. HAVE YOU EVER DONE ANYTHING, STARTED TO DO ANYTHING, OR PREPARED TO DO ANYTHING TO END YOUR LIFE?: NO
1. SINCE LAST CONTACT, HAVE YOU WISHED YOU WERE DEAD OR WISHED YOU COULD GO TO SLEEP AND NOT WAKE UP?: NO
2. HAVE YOU ACTUALLY HAD ANY THOUGHTS OF KILLING YOURSELF?: NO
6. HAVE YOU EVER DONE ANYTHING, STARTED TO DO ANYTHING, OR PREPARED TO DO ANYTHING TO END YOUR LIFE?: NO
5. HAVE YOU STARTED TO WORK OUT OR WORKED OUT THE DETAILS OF HOW TO KILL YOURSELF? DO YOU INTEND TO CARRY OUT THIS PLAN?: NO
2. HAVE YOU ACTUALLY HAD ANY THOUGHTS OF KILLING YOURSELF?: NO

## 2024-07-01 ASSESSMENT — PAIN SCALES - GENERAL
PAINLEVEL_OUTOF10: 0 - NO PAIN

## 2024-07-01 ASSESSMENT — PAIN - FUNCTIONAL ASSESSMENT: PAIN_FUNCTIONAL_ASSESSMENT: 0-10

## 2024-07-01 NOTE — NURSING NOTE
Pt calm and cooperative through shift. CIWA scores negative this shift. Pt ate his meals and had bladder scan. Pt rested through shift and ambulated in his room.

## 2024-07-01 NOTE — PROGRESS NOTES
Everton Geiger is a 58 y.o. male on day 5 of admission presenting with Alcohol withdrawal syndrome, with delirium (Multi).    Subjective   No cp/SOB       Objective         Current Facility-Administered Medications:     doxycycline (Vibramycin) capsule 100 mg, 100 mg, oral, q12h ALINE, Alton Griffith PA-C, 100 mg at 07/01/24 0802    enoxaparin (Lovenox) syringe 40 mg, 40 mg, subcutaneous, Daily, Alton Griffith PA-C, 40 mg at 07/01/24 0802    folic acid (Folvite) tablet 1 mg, 1 mg, oral, Daily, Alton Griffith PA-C, 1 mg at 07/01/24 0802    LORazepam (Ativan) injection 0.5 mg, 0.5 mg, intravenous, q2h PRN, 0.5 mg at 06/29/24 1605 **OR** LORazepam (Ativan) injection 1 mg, 1 mg, intravenous, q2h PRN **OR** LORazepam (Ativan) injection 2 mg, 2 mg, intravenous, q2h PRN, Alton Griffith PA-C, 2 mg at 06/28/24 0407    metoprolol succinate XL (Toprol-XL) 24 hr tablet 25 mg, 25 mg, oral, Daily, Juanita Alberto MD, 25 mg at 07/01/24 0802    multivitamin with minerals 1 tablet, 1 tablet, oral, Daily, Alton Griffith PA-C, 1 tablet at 07/01/24 0802    ondansetron ODT (Zofran-ODT) disintegrating tablet 4 mg, 4 mg, oral, q8h PRN **OR** ondansetron (Zofran) injection 4 mg, 4 mg, intravenous, q8h PRN, Delores Lovell, APRN-CNP    perflutren lipid microspheres (Definity) injection 0.5-10 mL of dilution, 0.5-10 mL of dilution, intravenous, Once in imaging, Alton Griffith PA-C    perflutren protein A microsphere (Optison) injection 0.5 mL, 0.5 mL, intravenous, Once in imaging, Alton Griffith PA-C    QUEtiapine (SEROquel) tablet 25 mg, 25 mg, oral, Nightly, Alton Griffith PA-C, 25 mg at 06/30/24 2050    sertraline (Zoloft) tablet 50 mg, 50 mg, oral, Nightly, Alton Griffith PA-C, 50 mg at 06/30/24 2050    sulfur hexafluoride microsphr (Lumason) injection 24.28 mg, 2 mL, intravenous, Once in imaging, Alton Griffith PA-C    thiamine (Vitamin B-1) tablet 100 mg, 100 mg, oral, Daily, Alton Griffith PA-C, 100  "mg at 07/01/24 0802       Physical Exam  HENT:      Head: Normocephalic.   Eyes:      Conjunctiva/sclera: Conjunctivae normal.   Cardiovascular:      Rate and Rhythm: Regular rhythm.   Pulmonary:      Breath sounds: Normal breath sounds.   Abdominal:      General: Bowel sounds are normal.      Palpations: Abdomen is soft.   Musculoskeletal:         General: Normal range of motion.   Skin:     General: Skin is warm and dry.   Neurological:      General: No focal deficit present.      Mental Status: He is alert.   Psychiatric:         Behavior: Behavior normal.           Last Recorded Vitals  Blood pressure 128/88, pulse 69, temperature 36.1 °C (97 °F), temperature source Temporal, resp. rate 12, height 1.753 m (5' 9.02\"), weight 77.5 kg (170 lb 14.4 oz), SpO2 98%.  Intake/Output last 3 Shifts:  I/O last 3 completed shifts:  In: 480 (6.2 mL/kg) [P.O.:480]  Out: 1000 (12.9 mL/kg) [Urine:1000 (0.4 mL/kg/hr)]  Weight: 77.5 kg     Labs:       Results for orders placed or performed during the hospital encounter of 06/26/24 (from the past 24 hour(s))   Magnesium   Result Value Ref Range    Magnesium 1.60 1.60 - 2.40 mg/dL   CBC and Auto Differential   Result Value Ref Range    WBC 5.3 4.4 - 11.3 x10*3/uL    nRBC 0.0 0.0 - 0.0 /100 WBCs    RBC 4.10 (L) 4.50 - 5.90 x10*6/uL    Hemoglobin 13.2 (L) 13.5 - 17.5 g/dL    Hematocrit 39.6 (L) 41.0 - 52.0 %    MCV 97 80 - 100 fL    MCH 32.2 26.0 - 34.0 pg    MCHC 33.3 32.0 - 36.0 g/dL    RDW 13.2 11.5 - 14.5 %    Platelets 255 150 - 450 x10*3/uL    Neutrophils % 38.7 40.0 - 80.0 %    Immature Granulocytes %, Automated 0.8 0.0 - 0.9 %    Lymphocytes % 44.9 13.0 - 44.0 %    Monocytes % 10.0 2.0 - 10.0 %    Eosinophils % 4.7 0.0 - 6.0 %    Basophils % 0.9 0.0 - 2.0 %    Neutrophils Absolute 2.04 1.20 - 7.70 x10*3/uL    Immature Granulocytes Absolute, Automated 0.04 0.00 - 0.70 x10*3/uL    Lymphocytes Absolute 2.37 1.20 - 4.80 x10*3/uL    Monocytes Absolute 0.53 0.10 - 1.00 x10*3/uL    " Eosinophils Absolute 0.25 0.00 - 0.70 x10*3/uL    Basophils Absolute 0.05 0.00 - 0.10 x10*3/uL   Comprehensive metabolic panel   Result Value Ref Range    Glucose 87 74 - 99 mg/dL    Sodium 137 136 - 145 mmol/L    Potassium 3.5 3.5 - 5.3 mmol/L    Chloride 107 98 - 107 mmol/L    Bicarbonate 23 21 - 32 mmol/L    Anion Gap 11 10 - 20 mmol/L    Urea Nitrogen 15 6 - 23 mg/dL    Creatinine 0.60 0.50 - 1.30 mg/dL    eGFR >90 >60 mL/min/1.73m*2    Calcium 8.8 8.6 - 10.3 mg/dL    Albumin 3.3 (L) 3.4 - 5.0 g/dL    Alkaline Phosphatase 46 33 - 120 U/L    Total Protein 6.1 (L) 6.4 - 8.2 g/dL    AST 10 9 - 39 U/L    Bilirubin, Total 0.5 0.0 - 1.2 mg/dL    ALT 8 (L) 10 - 52 U/L   Phosphorus   Result Value Ref Range    Phosphorus 4.4 2.5 - 4.9 mg/dL              Assessment/Plan   Principal Problem:    Alcohol withdrawal syndrome, with delirium (Multi)  Active Problems:    SOB (shortness of breath)  Urinary retention      PLAN: Pt is clinically stable, ok to dc in pt psych whitten, med list and labs reviewed, for now c/w current Rx, d/w CNP follow closely.              Cyrus Tapia MD

## 2024-07-01 NOTE — NURSING NOTE
EOS:  Slept well through the night with no complaints.  CIWA score has been 0.  Ambulating to bathroom using walker, voided once for 200cc, pleasantly refusing bladder scan and allen catheter.  Fluids encouraged.  Sitter remains at bedside.

## 2024-07-01 NOTE — PROGRESS NOTES
Physical Therapy    Physical Therapy Treatment    Patient Name: Everton Geiger  MRN: [id number]  Today's Date: 7/1/2024  Time Calculation  Start Time: 0820  Stop Time: 0844  Time Calculation (min): 24 min     3033/3033-A       07/01/24 0820   PT  Visit   PT Received On 07/01/24   Response to Previous Treatment Patient with no complaints from previous session.   General   Referred By Dr. Bundy   Past Medical History Relevant to Rehab Depression, alcohol abuse, closed dislocation finger, proximal interphalangeal joint, traumatic   Family/Caregiver Present Yes   Caregiver Feedback sitter present   Prior to Session Communication Bedside nurse   Patient Position Received Bed, 3 rail up   Preferred Learning Style verbal;visual   General Comment Pt agreeable to tx.   Precautions   Medical Precautions Fall precautions   Precautions Comment SI precautions   Pain Assessment   Pain Assessment 0-10   0-10 (Numeric) Pain Score 0 - No pain   Cognition   Overall Cognitive Status WFL   Orientation Level Oriented X4   Therapeutic Exercise   Therapeutic Exercise Performed Yes   Therapeutic Exercise Activity 1 Standing hip abd x 10   Therapeutic Exercise Activity 2 Standing heel raises x 15   Therapeutic Exercise Activity 3 Mini squats x 10   Therapeutic Exercise Activity 4 Standing marches x 10   Balance/Neuromuscular Re-Education   Balance/Neuromuscular Re-Education Activity Performed Yes   Balance/Neuromuscular Re-Education Activity 1 SL balance 2 x30', finger support on ww   Bed Mobility   Bed Mobility Yes   Bed Mobility 1   Bed Mobility 1 Supine to sitting   Level of Assistance 1 Distant supervision   Ambulation/Gait Training   Ambulation/Gait Training Performed Yes   Ambulation/Gait Training 1   Surface 1 Level tile   Device 1 Rolling walker   Gait Support Devices Gait belt   Assistance 1 Close supervision;Minimal verbal cues;Contact guard   Quality of Gait 1 WBOS;Diminished heel strike;Forward flexed posture   Comments/Distance  (ft) 1 250', pt required cues to stay within ww and became slightly off balance when making turns which required contact guard   Transfers   Transfer Yes   Transfer 1   Transfer From 1 Bed to   Transfer to 1 Stand   Technique 1 Sit to stand   Transfer Device 1 Walker;Gait belt   Transfer Level of Assistance 1 Close supervision   Trials/Comments 1 Pt used proper safety precautions when standing up from bed   Transfers 2   Transfer From 2 Stand to   Transfer to 2 Chair with arms   Technique 2 Sit to stand   Transfer Device 2 Walker;Gait belt   Transfer Level of Assistance 2 Close supervision   Trials/Comments 2 Pt perfomed stand to sit safely   Activity Tolerance   Endurance Endurance does not limit participation in activity   Early Mobility/Exercise Safety Screen Proceed with mobilization - No exclusion criteria met   PT Assessment   PT Assessment Results Decreased strength;Impaired balance   Rehab Prognosis Good   Evaluation/Treatment Tolerance Patient tolerated treatment well   End of Session Communication Bedside nurse   Assessment Comment Pt tolerated tx well and was able to do standing exercises without fatigue. He became off balance w turns during amb so the ww would still be an appropriate device to use.   End of Session Patient Position Up in chair;Alarm off, not on at start of session;Alarm off, caregiver present   Outpatient Education   Individual(s) Educated Patient   Education Provided Fall Risk;Body Mechanics;POC   PT Plan   Inpatient/Swing Bed or Outpatient Inpatient   PT Plan   Treatment/Interventions Bed mobility;Transfer training;Gait training;Balance training;Strengthening;Therapeutic exercise   PT Plan Ongoing PT   PT Frequency 5 times per week   PT Discharge Recommendations High intensity level of continued care   Equipment Recommended upon Discharge Wheeled walker   PT Recommended Transfer Status Assistive device;Stand by assist   PT - OK to Discharge Yes       Outcome Measures:  [facility] Basic  Mobility  Turning from your back to your side while in a flat bed without using bedrails: None  Moving from lying on your back to sitting on the side of a flat bed without using bedrails: None  Moving to and from bed to chair (including a wheelchair): None  Standing up from a chair using your arms (e.g. wheelchair or bedside chair): None  To walk in hospital room: A little  Climbing 3-5 steps with railing: A little  Basic Mobility - Total Score: 22        EDUCATION:  Outpatient Education  Individual(s) Educated: Patient  Education Provided: Fall Risk, Body Mechanics, POC    GOALS:  Encounter Problems       Encounter Problems (Active)       Balance       Pt will demonstrate static/dynamic standing balance for >/= 5 min CGA without evidence of instability or retro LOB with functional mobility tasks.   (Progressing)       Start:  06/28/24    Expected End:  07/12/24               Mobility       Pt will be able to ambulate >/= 50 ft with least restrictive device CGA with good safety awareness.  (Met)       Start:  06/28/24    Expected End:  07/12/24    Resolved:  06/30/24         Pt will complete supine, seated and standing exercises to maintain overall strength with minimal verbal cues.   (Progressing)       Start:  06/28/24    Expected End:  07/12/24            Pt will be able to negotiate ascending/descending flight of steps with use of unilateral HR SBA with sufficient foot clearance and good safety awareness for safe home going/community purposes.  (Progressing)       Start:  06/28/24    Expected End:  07/12/24               PT Transfers       Pt will be able to complete all bed mobility tasks independently from flat HOB.  (Progressing)       Start:  06/28/24    Expected End:  07/12/24            Pt will be able to complete all transfers with least restrictive device CGA demonstrating good safety awareness and proper body mechanics.  (Progressing)       Start:  06/28/24    Expected End:  07/12/24               Pain  - Adult

## 2024-07-01 NOTE — SIGNIFICANT EVENT
Per telephone discussion with Dr Tapia pt is medically cleared to go to inpatient psychiatric unit. He will add this to his note when he is able.     Angeline SON

## 2024-07-02 LAB
ALBUMIN SERPL BCP-MCNC: 3.5 G/DL (ref 3.4–5)
ALP SERPL-CCNC: 49 U/L (ref 33–120)
ALT SERPL W P-5'-P-CCNC: 9 U/L (ref 10–52)
ANION GAP SERPL CALC-SCNC: 13 MMOL/L (ref 10–20)
AST SERPL W P-5'-P-CCNC: 11 U/L (ref 9–39)
ATRIAL RATE: 108 BPM
BASOPHILS # BLD AUTO: 0.04 X10*3/UL (ref 0–0.1)
BASOPHILS NFR BLD AUTO: 0.8 %
BILIRUB SERPL-MCNC: 0.5 MG/DL (ref 0–1.2)
BUN SERPL-MCNC: 16 MG/DL (ref 6–23)
CALCIUM SERPL-MCNC: 8.9 MG/DL (ref 8.6–10.3)
CHLORIDE SERPL-SCNC: 106 MMOL/L (ref 98–107)
CO2 SERPL-SCNC: 24 MMOL/L (ref 21–32)
CREAT SERPL-MCNC: 0.63 MG/DL (ref 0.5–1.3)
EGFRCR SERPLBLD CKD-EPI 2021: >90 ML/MIN/1.73M*2
EOSINOPHIL # BLD AUTO: 0.16 X10*3/UL (ref 0–0.7)
EOSINOPHIL NFR BLD AUTO: 3 %
ERYTHROCYTE [DISTWIDTH] IN BLOOD BY AUTOMATED COUNT: 13.3 % (ref 11.5–14.5)
GLUCOSE SERPL-MCNC: 91 MG/DL (ref 74–99)
HCT VFR BLD AUTO: 40.3 % (ref 41–52)
HGB BLD-MCNC: 14.4 G/DL (ref 13.5–17.5)
IMM GRANULOCYTES # BLD AUTO: 0.06 X10*3/UL (ref 0–0.7)
IMM GRANULOCYTES NFR BLD AUTO: 1.1 % (ref 0–0.9)
LYMPHOCYTES # BLD AUTO: 2.15 X10*3/UL (ref 1.2–4.8)
LYMPHOCYTES NFR BLD AUTO: 40.6 %
MAGNESIUM SERPL-MCNC: 1.75 MG/DL (ref 1.6–2.4)
MCH RBC QN AUTO: 34.2 PG (ref 26–34)
MCHC RBC AUTO-ENTMCNC: 35.7 G/DL (ref 32–36)
MCV RBC AUTO: 96 FL (ref 80–100)
MONOCYTES # BLD AUTO: 0.54 X10*3/UL (ref 0.1–1)
MONOCYTES NFR BLD AUTO: 10.2 %
NEUTROPHILS # BLD AUTO: 2.35 X10*3/UL (ref 1.2–7.7)
NEUTROPHILS NFR BLD AUTO: 44.3 %
NRBC BLD-RTO: 0 /100 WBCS (ref 0–0)
P AXIS: 55 DEGREES
P OFFSET: 197 MS
P ONSET: 138 MS
PHOSPHATE SERPL-MCNC: 3.5 MG/DL (ref 2.5–4.9)
PLATELET # BLD AUTO: 253 X10*3/UL (ref 150–450)
POTASSIUM SERPL-SCNC: 3.9 MMOL/L (ref 3.5–5.3)
PR INTERVAL: 160 MS
PROT SERPL-MCNC: 6.2 G/DL (ref 6.4–8.2)
Q ONSET: 218 MS
QRS COUNT: 18 BEATS
QRS DURATION: 78 MS
QT INTERVAL: 380 MS
QTC CALCULATION(BAZETT): 509 MS
QTC FREDERICIA: 462 MS
R AXIS: 36 DEGREES
RBC # BLD AUTO: 4.21 X10*6/UL (ref 4.5–5.9)
SODIUM SERPL-SCNC: 139 MMOL/L (ref 136–145)
T AXIS: 76 DEGREES
T OFFSET: 408 MS
VENTRICULAR RATE: 108 BPM
WBC # BLD AUTO: 5.3 X10*3/UL (ref 4.4–11.3)

## 2024-07-02 PROCEDURE — 2500000002 HC RX 250 W HCPCS SELF ADMINISTERED DRUGS (ALT 637 FOR MEDICARE OP, ALT 636 FOR OP/ED): Performed by: PHYSICIAN ASSISTANT

## 2024-07-02 PROCEDURE — 97116 GAIT TRAINING THERAPY: CPT | Mod: GP,CQ

## 2024-07-02 PROCEDURE — 85025 COMPLETE CBC W/AUTO DIFF WBC: CPT | Performed by: PHYSICIAN ASSISTANT

## 2024-07-02 PROCEDURE — 97110 THERAPEUTIC EXERCISES: CPT | Mod: GP,CQ

## 2024-07-02 PROCEDURE — 1100000001 HC PRIVATE ROOM DAILY

## 2024-07-02 PROCEDURE — 2500000004 HC RX 250 GENERAL PHARMACY W/ HCPCS (ALT 636 FOR OP/ED): Performed by: PHYSICIAN ASSISTANT

## 2024-07-02 PROCEDURE — 2500000001 HC RX 250 WO HCPCS SELF ADMINISTERED DRUGS (ALT 637 FOR MEDICARE OP): Performed by: INTERNAL MEDICINE

## 2024-07-02 PROCEDURE — 2500000001 HC RX 250 WO HCPCS SELF ADMINISTERED DRUGS (ALT 637 FOR MEDICARE OP): Performed by: PHYSICIAN ASSISTANT

## 2024-07-02 PROCEDURE — 36415 COLL VENOUS BLD VENIPUNCTURE: CPT | Performed by: PHYSICIAN ASSISTANT

## 2024-07-02 PROCEDURE — 80053 COMPREHEN METABOLIC PANEL: CPT | Performed by: PHYSICIAN ASSISTANT

## 2024-07-02 PROCEDURE — 84100 ASSAY OF PHOSPHORUS: CPT | Performed by: PHYSICIAN ASSISTANT

## 2024-07-02 PROCEDURE — 2500000004 HC RX 250 GENERAL PHARMACY W/ HCPCS (ALT 636 FOR OP/ED): Performed by: NURSE PRACTITIONER

## 2024-07-02 PROCEDURE — 83735 ASSAY OF MAGNESIUM: CPT | Performed by: PHYSICIAN ASSISTANT

## 2024-07-02 RX ORDER — LANOLIN ALCOHOL/MO/W.PET/CERES
400 CREAM (GRAM) TOPICAL ONCE
Status: COMPLETED | OUTPATIENT
Start: 2024-07-02 | End: 2024-07-02

## 2024-07-02 ASSESSMENT — LIFESTYLE VARIABLES
VISUAL DISTURBANCES: NOT PRESENT
TOTAL SCORE: 0
AUDITORY DISTURBANCES: NOT PRESENT
ORIENTATION AND CLOUDING OF SENSORIUM: ORIENTED AND CAN DO SERIAL ADDITIONS
TOTAL SCORE: 0
ORIENTATION AND CLOUDING OF SENSORIUM: ORIENTED AND CAN DO SERIAL ADDITIONS
PAROXYSMAL SWEATS: NO SWEAT VISIBLE
ANXIETY: NO ANXIETY, AT EASE
AGITATION: NORMAL ACTIVITY
PAROXYSMAL SWEATS: NO SWEAT VISIBLE
NAUSEA AND VOMITING: NO NAUSEA AND NO VOMITING
AGITATION: NORMAL ACTIVITY
TREMOR: NO TREMOR
AUDITORY DISTURBANCES: NOT PRESENT
PULSE: 67
VISUAL DISTURBANCES: NOT PRESENT
HEADACHE, FULLNESS IN HEAD: NOT PRESENT
TOTAL SCORE: 0
PAROXYSMAL SWEATS: NO SWEAT VISIBLE
TOTAL SCORE: 0
BLOOD PRESSURE: 125/90
NAUSEA AND VOMITING: NO NAUSEA AND NO VOMITING
TOTAL SCORE: 0
VISUAL DISTURBANCES: NOT PRESENT
ORIENTATION AND CLOUDING OF SENSORIUM: ORIENTED AND CAN DO SERIAL ADDITIONS
AGITATION: NORMAL ACTIVITY
ORIENTATION AND CLOUDING OF SENSORIUM: ORIENTED AND CAN DO SERIAL ADDITIONS
HEADACHE, FULLNESS IN HEAD: NOT PRESENT
AUDITORY DISTURBANCES: NOT PRESENT
NAUSEA AND VOMITING: NO NAUSEA AND NO VOMITING
AGITATION: NORMAL ACTIVITY
VISUAL DISTURBANCES: NOT PRESENT
HEADACHE, FULLNESS IN HEAD: NOT PRESENT
HEADACHE, FULLNESS IN HEAD: NOT PRESENT
ANXIETY: NO ANXIETY, AT EASE
AUDITORY DISTURBANCES: NOT PRESENT
TREMOR: NO TREMOR
ANXIETY: NO ANXIETY, AT EASE
ANXIETY: NO ANXIETY, AT EASE
NAUSEA AND VOMITING: NO NAUSEA AND NO VOMITING
TREMOR: NO TREMOR
PAROXYSMAL SWEATS: NO SWEAT VISIBLE
TREMOR: NO TREMOR
VISUAL DISTURBANCES: NOT PRESENT
AGITATION: NORMAL ACTIVITY
NAUSEA AND VOMITING: NO NAUSEA AND NO VOMITING
AUDITORY DISTURBANCES: NOT PRESENT
PAROXYSMAL SWEATS: NO SWEAT VISIBLE
HEADACHE, FULLNESS IN HEAD: NOT PRESENT
ANXIETY: NO ANXIETY, AT EASE
ORIENTATION AND CLOUDING OF SENSORIUM: ORIENTED AND CAN DO SERIAL ADDITIONS
TREMOR: NO TREMOR

## 2024-07-02 ASSESSMENT — COLUMBIA-SUICIDE SEVERITY RATING SCALE - C-SSRS
2. HAVE YOU ACTUALLY HAD ANY THOUGHTS OF KILLING YOURSELF?: NO
2. HAVE YOU ACTUALLY HAD ANY THOUGHTS OF KILLING YOURSELF?: NO
5. HAVE YOU STARTED TO WORK OUT OR WORKED OUT THE DETAILS OF HOW TO KILL YOURSELF? DO YOU INTEND TO CARRY OUT THIS PLAN?: NO
1. SINCE LAST CONTACT, HAVE YOU WISHED YOU WERE DEAD OR WISHED YOU COULD GO TO SLEEP AND NOT WAKE UP?: NO
6. HAVE YOU EVER DONE ANYTHING, STARTED TO DO ANYTHING, OR PREPARED TO DO ANYTHING TO END YOUR LIFE?: NO
1. SINCE LAST CONTACT, HAVE YOU WISHED YOU WERE DEAD OR WISHED YOU COULD GO TO SLEEP AND NOT WAKE UP?: NO
5. HAVE YOU STARTED TO WORK OUT OR WORKED OUT THE DETAILS OF HOW TO KILL YOURSELF? DO YOU INTEND TO CARRY OUT THIS PLAN?: NO
6. HAVE YOU EVER DONE ANYTHING, STARTED TO DO ANYTHING, OR PREPARED TO DO ANYTHING TO END YOUR LIFE?: NO

## 2024-07-02 ASSESSMENT — PAIN SCALES - GENERAL
PAINLEVEL_OUTOF10: 0 - NO PAIN

## 2024-07-02 ASSESSMENT — COGNITIVE AND FUNCTIONAL STATUS - GENERAL
MOBILITY SCORE: 22
CLIMB 3 TO 5 STEPS WITH RAILING: A LITTLE
MOBILITY SCORE: 24
DAILY ACTIVITIY SCORE: 24
WALKING IN HOSPITAL ROOM: A LITTLE

## 2024-07-02 ASSESSMENT — PAIN - FUNCTIONAL ASSESSMENT: PAIN_FUNCTIONAL_ASSESSMENT: 0-10

## 2024-07-02 NOTE — PROGRESS NOTES
Physical Therapy    Physical Therapy Treatment    Patient Name: Everton Geiger  MRN: [id number]  Today's Date: 7/2/2024  Time Calculation  Start Time: 0920  Stop Time: 0950  Time Calculation (min): 30 min     3033/3033-A       07/02/24 0920   PT  Visit   PT Received On 07/02/24   Response to Previous Treatment Patient with no complaints from previous session.   General   Reason for Referral P/w AMS. Per EMS, patient's brother reported that patient had locked himself in his room over the past few days.  On welfare check, patient was found laying on the bed with bottles of benzos as well as alcohol around him.  There is also Benadryl pills as well.  On arrival, patient was altered mumbling words. Pt admitted for alcohol withdrawal syndrome with delirium   Referred By Dr. Bundy   Past Medical History Relevant to Rehab Depression, alcohol abuse, closed dislocation finger, proximal interphalangeal joint, traumatic   Family/Caregiver Present Yes   Caregiver Feedback sitter present   Prior to Session Communication Bedside nurse   Patient Position Received Bed, 3 rail up   Preferred Learning Style verbal;visual   General Comment Pt agreeable to tx.   Precautions   Medical Precautions Fall precautions   Precautions Comment SI precautions   Pain Assessment   Pain Assessment 0-10   0-10 (Numeric) Pain Score 0 - No pain   Cognition   Overall Cognitive Status WFL   Orientation Level Oriented X4   Therapeutic Exercise   Therapeutic Exercise Performed Yes   Therapeutic Exercise Activity 1 Standing hip abd 2 x10   Therapeutic Exercise Activity 2 Standing heel raises x 15   Therapeutic Exercise Activity 3 standing marches x 15   Therapeutic Exercise Activity 4 Standing toe raises x 15   Balance/Neuromuscular Re-Education   Balance/Neuromuscular Re-Education Activity Performed Yes   Balance/Neuromuscular Re-Education Activity 1 Sl abalnce 2 x 30' finger support on ww   Balance/Neuromuscular Re-Education Activity 2 Tandem balance 2 x  30'   Bed Mobility   Bed Mobility Yes   Bed Mobility 1   Bed Mobility 1 Supine to sitting   Level of Assistance 1 Distant supervision   Ambulation/Gait Training   Ambulation/Gait Training Performed Yes   Ambulation/Gait Training 1   Surface 1 Level tile   Device 1 Rolling walker   Gait Support Devices Gait belt   Assistance 1 Close supervision;Contact guard;Minimal verbal cues   Quality of Gait 1 WBOS;Diminished heel strike;Forward flexed posture   Comments/Distance (ft) 1 250', pt needed cues to slow down and keep the walker closer so he isn't leaning over   Transfers   Transfer Yes   Transfer 1   Transfer From 1 Bed to   Transfer to 1 Stand   Technique 1 Sit to stand   Transfer Device 1 Walker;Gait belt   Transfer Level of Assistance 1 Close supervision   Transfers 2   Transfer From 2 Stand to   Transfer to 2 Bed   Technique 2 Stand to sit   Transfer Device 2 Walker;Gait belt   Transfer Level of Assistance 2 Close supervision;Minimal verbal cues   Trials/Comments 2 Pt needed cues to reach back prior to sitting back down   Activity Tolerance   Endurance Endurance does not limit participation in activity   PT Assessment   PT Assessment Results Decreased strength;Impaired balance   Rehab Prognosis Good   End of Session Communication Bedside nurse   Assessment Comment Pt tolerated tx well. He did better w his ww when amb and took his turns slower to stay more controlled.   End of Session Patient Position Alarm off, not on at start of session;Alarm off, caregiver present;Bed, 3 rail up   Outpatient Education   Individual(s) Educated Patient   Education Provided Fall Risk;Body Mechanics;POC   Risk and Benefits Discussed with Patient/Caregiver/Other yes   Patient/Caregiver Demonstrated Understanding yes   Plan of Care Discussed and Agreed Upon yes   PT Plan   Inpatient/Swing Bed or Outpatient Inpatient   PT Plan   Treatment/Interventions Bed mobility;Transfer training;Gait training;Therapeutic exercise;Neuromuscular  re-education   PT Plan Ongoing PT   PT Frequency 5 times per week   PT Discharge Recommendations High intensity level of continued care   Equipment Recommended upon Discharge Wheeled walker         Outcome Measures:  [facility] Basic Mobility  Turning from your back to your side while in a flat bed without using bedrails: None  Moving from lying on your back to sitting on the side of a flat bed without using bedrails: None  Moving to and from bed to chair (including a wheelchair): None  Standing up from a chair using your arms (e.g. wheelchair or bedside chair): None  To walk in hospital room: A little  Climbing 3-5 steps with railing: A little  Basic Mobility - Total Score: 22      EDUCATION:  Outpatient Education  Individual(s) Educated: Patient  Education Provided: Fall Risk, Body Mechanics, POC  Risk and Benefits Discussed with Patient/Caregiver/Other: yes  Patient/Caregiver Demonstrated Understanding: yes  Plan of Care Discussed and Agreed Upon: yes    GOALS:  Encounter Problems       Encounter Problems (Active)       Balance       Pt will demonstrate static/dynamic standing balance for >/= 5 min CGA without evidence of instability or retro LOB with functional mobility tasks.   (Progressing)       Start:  06/28/24    Expected End:  07/12/24               Mobility       Pt will be able to ambulate >/= 50 ft with least restrictive device CGA with good safety awareness.  (Met)       Start:  06/28/24    Expected End:  07/12/24    Resolved:  06/30/24         Pt will complete supine, seated and standing exercises to maintain overall strength with minimal verbal cues.   (Progressing)       Start:  06/28/24    Expected End:  07/12/24            Pt will be able to negotiate ascending/descending flight of steps with use of unilateral HR SBA with sufficient foot clearance and good safety awareness for safe home going/community purposes.  (Progressing)       Start:  06/28/24    Expected End:  07/12/24               PT Transfers        Pt will be able to complete all bed mobility tasks independently from flat HOB.  (Progressing)       Start:  06/28/24    Expected End:  07/12/24            Pt will be able to complete all transfers with least restrictive device CGA demonstrating good safety awareness and proper body mechanics.  (Progressing)       Start:  06/28/24    Expected End:  07/12/24               Pain - Adult

## 2024-07-02 NOTE — NURSING NOTE
Pt calm and cooperative through shift. Pt did not complain of pain. Pt rested through shift and up to bathroom this shift. Pt ate his meals and was CIWA negative.

## 2024-07-02 NOTE — PROGRESS NOTES
"Everton Geiger is a 58 y.o. male on day 6 of admission presenting with Alcohol withdrawal syndrome, with delirium (Multi).    SUBJECTIVE:  Remain depressed  Patient remain hopeless and worthless. No active withdrawal symptoms. Flat affect.  Patient is currently not safe to be discharged back home and he is agreeable to go to inpatient psychiatry unit when medically stable and cleared    Exam:  Vital Signs: /84 (BP Location: Right arm, Patient Position: Lying)   Pulse 75   Temp 37.2 °C (99 °F) (Temporal)   Resp 18   Ht 1.753 m (5' 9.02\")   Wt 76.7 kg (169 lb 1.1 oz)   SpO2 95%   BMI 24.96 kg/m²   Musculoskeletal: Muscle tone: WNL  Gait/Station: normal      Mental Status Exam:  General Appearance: { Psych Appearance:[id number]  Speech: soft  Mood: depressed  Affect: appropriate  Thought Process: Linear, goal directed  Thought Associations: No loosening of associations  Thought Content: suicidal  Perception: No perceptual abnormalities noted  Level of Consciousness: Alert  Orientation: Alert and oriented to person, place, time and situation  Attention and Concentration: Mild impairment in attention  Cognition:  Insight: poor  Judgment: poor     Results for orders placed or performed during the hospital encounter of 06/26/24 (from the past 96 hour(s))   Magnesium   Result Value Ref Range    Magnesium 1.79 1.60 - 2.40 mg/dL   CBC and Auto Differential   Result Value Ref Range    WBC 5.4 4.4 - 11.3 x10*3/uL    nRBC 0.0 0.0 - 0.0 /100 WBCs    RBC 4.26 (L) 4.50 - 5.90 x10*6/uL    Hemoglobin 13.9 13.5 - 17.5 g/dL    Hematocrit 42.1 41.0 - 52.0 %    MCV 99 80 - 100 fL    MCH 32.6 26.0 - 34.0 pg    MCHC 33.0 32.0 - 36.0 g/dL    RDW 13.2 11.5 - 14.5 %    Platelets 255 150 - 450 x10*3/uL    Neutrophils % 47.5 40.0 - 80.0 %    Immature Granulocytes %, Automated 0.9 0.0 - 0.9 %    Lymphocytes % 35.1 13.0 - 44.0 %    Monocytes % 10.2 2.0 - 10.0 %    Eosinophils % 5.4 0.0 - 6.0 %    Basophils % 0.9 0.0 - 2.0 %    " Neutrophils Absolute 2.56 1.20 - 7.70 x10*3/uL    Immature Granulocytes Absolute, Automated 0.05 0.00 - 0.70 x10*3/uL    Lymphocytes Absolute 1.89 1.20 - 4.80 x10*3/uL    Monocytes Absolute 0.55 0.10 - 1.00 x10*3/uL    Eosinophils Absolute 0.29 0.00 - 0.70 x10*3/uL    Basophils Absolute 0.05 0.00 - 0.10 x10*3/uL   Comprehensive metabolic panel   Result Value Ref Range    Glucose 85 74 - 99 mg/dL    Sodium 137 136 - 145 mmol/L    Potassium 3.9 3.5 - 5.3 mmol/L    Chloride 105 98 - 107 mmol/L    Bicarbonate 23 21 - 32 mmol/L    Anion Gap 13 10 - 20 mmol/L    Urea Nitrogen 15 6 - 23 mg/dL    Creatinine 0.61 0.50 - 1.30 mg/dL    eGFR >90 >60 mL/min/1.73m*2    Calcium 9.3 8.6 - 10.3 mg/dL    Albumin 3.5 3.4 - 5.0 g/dL    Alkaline Phosphatase 53 33 - 120 U/L    Total Protein 6.6 6.4 - 8.2 g/dL    AST 11 9 - 39 U/L    Bilirubin, Total 0.7 0.0 - 1.2 mg/dL    ALT 6 (L) 10 - 52 U/L   Phosphorus   Result Value Ref Range    Phosphorus 2.9 2.5 - 4.9 mg/dL   ECG 12 Lead   Result Value Ref Range    Ventricular Rate 108 BPM    Atrial Rate 108 BPM    NE Interval 160 ms    QRS Duration 78 ms    QT Interval 380 ms    QTC Calculation(Bazett) 509 ms    P Axis 55 degrees    R Axis 36 degrees    T Axis 76 degrees    QRS Count 18 beats    Q Onset 218 ms    P Onset 138 ms    P Offset 197 ms    T Offset 408 ms    QTC Fredericia 462 ms   Basic Metabolic Panel   Result Value Ref Range    Glucose 109 (H) 74 - 99 mg/dL    Sodium 136 136 - 145 mmol/L    Potassium 4.0 3.5 - 5.3 mmol/L    Chloride 105 98 - 107 mmol/L    Bicarbonate 24 21 - 32 mmol/L    Anion Gap 11 10 - 20 mmol/L    Urea Nitrogen 16 6 - 23 mg/dL    Creatinine 0.65 0.50 - 1.30 mg/dL    eGFR >90 >60 mL/min/1.73m*2    Calcium 9.6 8.6 - 10.3 mg/dL   Magnesium   Result Value Ref Range    Magnesium 1.71 1.60 - 2.40 mg/dL   Magnesium   Result Value Ref Range    Magnesium 1.61 1.60 - 2.40 mg/dL   CBC and Auto Differential   Result Value Ref Range    WBC 7.0 4.4 - 11.3 x10*3/uL    nRBC 0.0  0.0 - 0.0 /100 WBCs    RBC 4.12 (L) 4.50 - 5.90 x10*6/uL    Hemoglobin 13.4 (L) 13.5 - 17.5 g/dL    Hematocrit 40.5 (L) 41.0 - 52.0 %    MCV 98 80 - 100 fL    MCH 32.5 26.0 - 34.0 pg    MCHC 33.1 32.0 - 36.0 g/dL    RDW 13.2 11.5 - 14.5 %    Platelets 263 150 - 450 x10*3/uL    Neutrophils % 53.1 40.0 - 80.0 %    Immature Granulocytes %, Automated 0.7 0.0 - 0.9 %    Lymphocytes % 31.7 13.0 - 44.0 %    Monocytes % 10.3 2.0 - 10.0 %    Eosinophils % 3.3 0.0 - 6.0 %    Basophils % 0.9 0.0 - 2.0 %    Neutrophils Absolute 3.73 1.20 - 7.70 x10*3/uL    Immature Granulocytes Absolute, Automated 0.05 0.00 - 0.70 x10*3/uL    Lymphocytes Absolute 2.22 1.20 - 4.80 x10*3/uL    Monocytes Absolute 0.72 0.10 - 1.00 x10*3/uL    Eosinophils Absolute 0.23 0.00 - 0.70 x10*3/uL    Basophils Absolute 0.06 0.00 - 0.10 x10*3/uL   Comprehensive metabolic panel   Result Value Ref Range    Glucose 97 74 - 99 mg/dL    Sodium 138 136 - 145 mmol/L    Potassium 3.9 3.5 - 5.3 mmol/L    Chloride 108 (H) 98 - 107 mmol/L    Bicarbonate 22 21 - 32 mmol/L    Anion Gap 12 10 - 20 mmol/L    Urea Nitrogen 22 6 - 23 mg/dL    Creatinine 0.64 0.50 - 1.30 mg/dL    eGFR >90 >60 mL/min/1.73m*2    Calcium 9.4 8.6 - 10.3 mg/dL    Albumin 3.4 3.4 - 5.0 g/dL    Alkaline Phosphatase 51 33 - 120 U/L    Total Protein 6.4 6.4 - 8.2 g/dL    AST 11 9 - 39 U/L    Bilirubin, Total 0.4 0.0 - 1.2 mg/dL    ALT 9 (L) 10 - 52 U/L   Phosphorus   Result Value Ref Range    Phosphorus 4.5 2.5 - 4.9 mg/dL   Magnesium   Result Value Ref Range    Magnesium 1.60 1.60 - 2.40 mg/dL   CBC and Auto Differential   Result Value Ref Range    WBC 5.3 4.4 - 11.3 x10*3/uL    nRBC 0.0 0.0 - 0.0 /100 WBCs    RBC 4.10 (L) 4.50 - 5.90 x10*6/uL    Hemoglobin 13.2 (L) 13.5 - 17.5 g/dL    Hematocrit 39.6 (L) 41.0 - 52.0 %    MCV 97 80 - 100 fL    MCH 32.2 26.0 - 34.0 pg    MCHC 33.3 32.0 - 36.0 g/dL    RDW 13.2 11.5 - 14.5 %    Platelets 255 150 - 450 x10*3/uL    Neutrophils % 38.7 40.0 - 80.0 %     Immature Granulocytes %, Automated 0.8 0.0 - 0.9 %    Lymphocytes % 44.9 13.0 - 44.0 %    Monocytes % 10.0 2.0 - 10.0 %    Eosinophils % 4.7 0.0 - 6.0 %    Basophils % 0.9 0.0 - 2.0 %    Neutrophils Absolute 2.04 1.20 - 7.70 x10*3/uL    Immature Granulocytes Absolute, Automated 0.04 0.00 - 0.70 x10*3/uL    Lymphocytes Absolute 2.37 1.20 - 4.80 x10*3/uL    Monocytes Absolute 0.53 0.10 - 1.00 x10*3/uL    Eosinophils Absolute 0.25 0.00 - 0.70 x10*3/uL    Basophils Absolute 0.05 0.00 - 0.10 x10*3/uL   Comprehensive metabolic panel   Result Value Ref Range    Glucose 87 74 - 99 mg/dL    Sodium 137 136 - 145 mmol/L    Potassium 3.5 3.5 - 5.3 mmol/L    Chloride 107 98 - 107 mmol/L    Bicarbonate 23 21 - 32 mmol/L    Anion Gap 11 10 - 20 mmol/L    Urea Nitrogen 15 6 - 23 mg/dL    Creatinine 0.60 0.50 - 1.30 mg/dL    eGFR >90 >60 mL/min/1.73m*2    Calcium 8.8 8.6 - 10.3 mg/dL    Albumin 3.3 (L) 3.4 - 5.0 g/dL    Alkaline Phosphatase 46 33 - 120 U/L    Total Protein 6.1 (L) 6.4 - 8.2 g/dL    AST 10 9 - 39 U/L    Bilirubin, Total 0.5 0.0 - 1.2 mg/dL    ALT 8 (L) 10 - 52 U/L   Phosphorus   Result Value Ref Range    Phosphorus 4.4 2.5 - 4.9 mg/dL   Magnesium   Result Value Ref Range    Magnesium 1.75 1.60 - 2.40 mg/dL   CBC and Auto Differential   Result Value Ref Range    WBC 5.3 4.4 - 11.3 x10*3/uL    nRBC 0.0 0.0 - 0.0 /100 WBCs    RBC 4.21 (L) 4.50 - 5.90 x10*6/uL    Hemoglobin 14.4 13.5 - 17.5 g/dL    Hematocrit 40.3 (L) 41.0 - 52.0 %    MCV 96 80 - 100 fL    MCH 34.2 (H) 26.0 - 34.0 pg    MCHC 35.7 32.0 - 36.0 g/dL    RDW 13.3 11.5 - 14.5 %    Platelets 253 150 - 450 x10*3/uL    Neutrophils % 44.3 40.0 - 80.0 %    Immature Granulocytes %, Automated 1.1 (H) 0.0 - 0.9 %    Lymphocytes % 40.6 13.0 - 44.0 %    Monocytes % 10.2 2.0 - 10.0 %    Eosinophils % 3.0 0.0 - 6.0 %    Basophils % 0.8 0.0 - 2.0 %    Neutrophils Absolute 2.35 1.20 - 7.70 x10*3/uL    Immature Granulocytes Absolute, Automated 0.06 0.00 - 0.70 x10*3/uL     Lymphocytes Absolute 2.15 1.20 - 4.80 x10*3/uL    Monocytes Absolute 0.54 0.10 - 1.00 x10*3/uL    Eosinophils Absolute 0.16 0.00 - 0.70 x10*3/uL    Basophils Absolute 0.04 0.00 - 0.10 x10*3/uL   Comprehensive metabolic panel   Result Value Ref Range    Glucose 91 74 - 99 mg/dL    Sodium 139 136 - 145 mmol/L    Potassium 3.9 3.5 - 5.3 mmol/L    Chloride 106 98 - 107 mmol/L    Bicarbonate 24 21 - 32 mmol/L    Anion Gap 13 10 - 20 mmol/L    Urea Nitrogen 16 6 - 23 mg/dL    Creatinine 0.63 0.50 - 1.30 mg/dL    eGFR >90 >60 mL/min/1.73m*2    Calcium 8.9 8.6 - 10.3 mg/dL    Albumin 3.5 3.4 - 5.0 g/dL    Alkaline Phosphatase 49 33 - 120 U/L    Total Protein 6.2 (L) 6.4 - 8.2 g/dL    AST 11 9 - 39 U/L    Bilirubin, Total 0.5 0.0 - 1.2 mg/dL    ALT 9 (L) 10 - 52 U/L   Phosphorus   Result Value Ref Range    Phosphorus 3.5 2.5 - 4.9 mg/dL         Risk Assessment:  High risk of alcohol relapse and suicide    Impression:  MDD recurrent severe   Alcohol use disorder    Recommendations:    1. Safety:  Maintain sitter     2.  Recommend transfer to inpatient psychiatry unit when medically stable.  [facility] to be contacted.  Pink slip in the chart(accepting hospital name is to be added)    Thank you for allowing us to participate in the care of this patient.       Bora Lama MD  7/2/2024  4:18 PM

## 2024-07-02 NOTE — PROGRESS NOTES
Everton Geiger is a 58 y.o. male on day 6 of admission presenting with Alcohol withdrawal syndrome, with delirium (Multi).    Subjective   No cp/SOb       Objective         Current Facility-Administered Medications:     enoxaparin (Lovenox) syringe 40 mg, 40 mg, subcutaneous, Daily, Alton Griffith PA-C, 40 mg at 07/02/24 0819    folic acid (Folvite) tablet 1 mg, 1 mg, oral, Daily, Alton Griffith PA-C, 1 mg at 07/02/24 0819    LORazepam (Ativan) injection 0.5 mg, 0.5 mg, intravenous, q2h PRN, 0.5 mg at 06/29/24 1605 **OR** LORazepam (Ativan) injection 1 mg, 1 mg, intravenous, q2h PRN **OR** LORazepam (Ativan) injection 2 mg, 2 mg, intravenous, q2h PRN, Alton Griffith PA-C, 2 mg at 06/28/24 0407    metoprolol succinate XL (Toprol-XL) 24 hr tablet 25 mg, 25 mg, oral, Daily, Juanita Alberto MD, 25 mg at 07/02/24 0819    multivitamin with minerals 1 tablet, 1 tablet, oral, Daily, Alton Griffith PA-C, 1 tablet at 07/02/24 0819    ondansetron ODT (Zofran-ODT) disintegrating tablet 4 mg, 4 mg, oral, q8h PRN **OR** ondansetron (Zofran) injection 4 mg, 4 mg, intravenous, q8h PRN, Delores Lovell, APRN-CNP    perflutren lipid microspheres (Definity) injection 0.5-10 mL of dilution, 0.5-10 mL of dilution, intravenous, Once in imaging, Alton Griffith PA-C    perflutren protein A microsphere (Optison) injection 0.5 mL, 0.5 mL, intravenous, Once in imaging, Alton Griffith PA-C    QUEtiapine (SEROquel) tablet 25 mg, 25 mg, oral, Nightly, Alton Griffith PA-C, 25 mg at 07/01/24 2030    sertraline (Zoloft) tablet 50 mg, 50 mg, oral, Nightly, Alton Griffith PA-C, 50 mg at 07/01/24 2030    sulfur hexafluoride microsphr (Lumason) injection 24.28 mg, 2 mL, intravenous, Once in imaging, Alton Griffith PA-C    thiamine (Vitamin B-1) tablet 100 mg, 100 mg, oral, Daily, Alton Griffith PA-C, 100 mg at 07/02/24 0819       Physical Exam  HENT:      Head: Normocephalic.      Mouth/Throat:      Pharynx: Oropharynx  "is clear.   Eyes:      Conjunctiva/sclera: Conjunctivae normal.   Cardiovascular:      Rate and Rhythm: Regular rhythm.   Pulmonary:      Breath sounds: Normal breath sounds.   Abdominal:      General: Bowel sounds are normal.      Palpations: Abdomen is soft.   Musculoskeletal:         General: Normal range of motion.   Skin:     General: Skin is warm and dry.   Neurological:      General: No focal deficit present.      Mental Status: He is alert.   Psychiatric:         Behavior: Behavior normal.           Last Recorded Vitals  Blood pressure 120/87, pulse 66, temperature 36.5 °C (97.7 °F), temperature source Temporal, resp. rate 16, height 1.753 m (5' 9.02\"), weight 76.7 kg (169 lb 1.1 oz), SpO2 97%.  Intake/Output last 3 Shifts:  I/O last 3 completed shifts:  In: 840 (11 mL/kg) [P.O.:840]  Out: 1175 (15.3 mL/kg) [Urine:1175 (0.4 mL/kg/hr)]  Weight: 76.7 kg     Labs:       Results for orders placed or performed during the hospital encounter of 06/26/24 (from the past 24 hour(s))   Magnesium   Result Value Ref Range    Magnesium 1.75 1.60 - 2.40 mg/dL   CBC and Auto Differential   Result Value Ref Range    WBC 5.3 4.4 - 11.3 x10*3/uL    nRBC 0.0 0.0 - 0.0 /100 WBCs    RBC 4.21 (L) 4.50 - 5.90 x10*6/uL    Hemoglobin 14.4 13.5 - 17.5 g/dL    Hematocrit 40.3 (L) 41.0 - 52.0 %    MCV 96 80 - 100 fL    MCH 34.2 (H) 26.0 - 34.0 pg    MCHC 35.7 32.0 - 36.0 g/dL    RDW 13.3 11.5 - 14.5 %    Platelets 253 150 - 450 x10*3/uL    Neutrophils % 44.3 40.0 - 80.0 %    Immature Granulocytes %, Automated 1.1 (H) 0.0 - 0.9 %    Lymphocytes % 40.6 13.0 - 44.0 %    Monocytes % 10.2 2.0 - 10.0 %    Eosinophils % 3.0 0.0 - 6.0 %    Basophils % 0.8 0.0 - 2.0 %    Neutrophils Absolute 2.35 1.20 - 7.70 x10*3/uL    Immature Granulocytes Absolute, Automated 0.06 0.00 - 0.70 x10*3/uL    Lymphocytes Absolute 2.15 1.20 - 4.80 x10*3/uL    Monocytes Absolute 0.54 0.10 - 1.00 x10*3/uL    Eosinophils Absolute 0.16 0.00 - 0.70 x10*3/uL    Basophils " Absolute 0.04 0.00 - 0.10 x10*3/uL   Comprehensive metabolic panel   Result Value Ref Range    Glucose 91 74 - 99 mg/dL    Sodium 139 136 - 145 mmol/L    Potassium 3.9 3.5 - 5.3 mmol/L    Chloride 106 98 - 107 mmol/L    Bicarbonate 24 21 - 32 mmol/L    Anion Gap 13 10 - 20 mmol/L    Urea Nitrogen 16 6 - 23 mg/dL    Creatinine 0.63 0.50 - 1.30 mg/dL    eGFR >90 >60 mL/min/1.73m*2    Calcium 8.9 8.6 - 10.3 mg/dL    Albumin 3.5 3.4 - 5.0 g/dL    Alkaline Phosphatase 49 33 - 120 U/L    Total Protein 6.2 (L) 6.4 - 8.2 g/dL    AST 11 9 - 39 U/L    Bilirubin, Total 0.5 0.0 - 1.2 mg/dL    ALT 9 (L) 10 - 52 U/L   Phosphorus   Result Value Ref Range    Phosphorus 3.5 2.5 - 4.9 mg/dL              Assessment/Plan   Principal Problem:    Alcohol withdrawal syndrome, with delirium (Multi)  Active Problems:    SOB (shortness of breath)  Urinary retention        PLAN: Pt is slowly improving and is clinically stable, ok to transfer to in pt psych whitten, med list and labs reviewed, appreciate input of Psych, follow closely.         Cyrus Tapia MD

## 2024-07-02 NOTE — PROGRESS NOTES
Spiritual Care Visit    Clinical Encounter Type  Visited With: Patient and family together  Routine Visit: Introduction                             Advance Directives  Advance Directives Reviewed Date: 07/02/24  Advance Directives Reviewed with: Patient  Advance Directives Completed Date: 07/02/24  Advance Directives Type: Living Will, Power of  for Healthcare              Taxonomy  Intended Effects: Build relationship of care and support, Preserve dignity and respect  Methods: Collaborate with care team member, Offer spiritual/Buddhist support  Interventions: Active listening, Assist someone with Advance Directives

## 2024-07-02 NOTE — PROGRESS NOTES
"Everton Geiger is a 58 y.o. male on day 5 of admission presenting with Alcohol withdrawal syndrome, with delirium (Multi).    SUBJECTIVE:  No major change in presentation. Remain depressed  Patient remain hopeless and worthless.  Patient is currently not safe to be discharged back home and he is agreeable to go to inpatient psychiatry unit when medically stable and cleared    Exam:  Vital Signs: BP (!) 125/99 Comment: rn notified  Pulse 73   Temp 37.2 °C (99 °F) (Temporal)   Resp 16   Ht 1.753 m (5' 9.02\")   Wt 77.5 kg (170 lb 14.4 oz)   SpO2 96%   BMI 25.23 kg/m²   Musculoskeletal: Muscle tone: WNL  Gait/Station: normal      Mental Status Exam:  General Appearance: { Psych Appearance:[id number]  Speech: soft  Mood: depressed  Affect: appropriate  Thought Process: Linear, goal directed  Thought Associations: No loosening of associations  Thought Content: suicidal  Perception: No perceptual abnormalities noted  Level of Consciousness: Alert  Orientation: Alert and oriented to person, place, time and situation  Attention and Concentration: Mild impairment in attention  Cognition:  Insight: poor  Judgment: poor     Results for orders placed or performed during the hospital encounter of 06/26/24 (from the past 96 hour(s))   POCT GLUCOSE   Result Value Ref Range    POCT Glucose 118 (H) 74 - 99 mg/dL   Magnesium   Result Value Ref Range    Magnesium 2.06 1.60 - 2.40 mg/dL   CBC and Auto Differential   Result Value Ref Range    WBC 8.2 4.4 - 11.3 x10*3/uL    nRBC 0.0 0.0 - 0.0 /100 WBCs    RBC 4.20 (L) 4.50 - 5.90 x10*6/uL    Hemoglobin 13.9 13.5 - 17.5 g/dL    Hematocrit 40.2 (L) 41.0 - 52.0 %    MCV 96 80 - 100 fL    MCH 33.1 26.0 - 34.0 pg    MCHC 34.6 32.0 - 36.0 g/dL    RDW 12.8 11.5 - 14.5 %    Platelets 248 150 - 450 x10*3/uL    Neutrophils % 61.5 40.0 - 80.0 %    Immature Granulocytes %, Automated 0.4 0.0 - 0.9 %    Lymphocytes % 25.4 13.0 - 44.0 %    Monocytes % 9.5 2.0 - 10.0 %    Eosinophils % 2.7 0.0 - " 6.0 %    Basophils % 0.5 0.0 - 2.0 %    Neutrophils Absolute 5.07 1.20 - 7.70 x10*3/uL    Immature Granulocytes Absolute, Automated 0.03 0.00 - 0.70 x10*3/uL    Lymphocytes Absolute 2.09 1.20 - 4.80 x10*3/uL    Monocytes Absolute 0.78 0.10 - 1.00 x10*3/uL    Eosinophils Absolute 0.22 0.00 - 0.70 x10*3/uL    Basophils Absolute 0.04 0.00 - 0.10 x10*3/uL   Comprehensive metabolic panel   Result Value Ref Range    Glucose 107 (H) 74 - 99 mg/dL    Sodium 134 (L) 136 - 145 mmol/L    Potassium 3.7 3.5 - 5.3 mmol/L    Chloride 100 98 - 107 mmol/L    Bicarbonate 26 21 - 32 mmol/L    Anion Gap 12 10 - 20 mmol/L    Urea Nitrogen 7 6 - 23 mg/dL    Creatinine 0.71 0.50 - 1.30 mg/dL    eGFR >90 >60 mL/min/1.73m*2    Calcium 8.6 8.6 - 10.3 mg/dL    Albumin 3.6 3.4 - 5.0 g/dL    Alkaline Phosphatase 57 33 - 120 U/L    Total Protein 6.5 6.4 - 8.2 g/dL    AST 12 9 - 39 U/L    Bilirubin, Total 1.1 0.0 - 1.2 mg/dL    ALT 7 (L) 10 - 52 U/L   Phosphorus   Result Value Ref Range    Phosphorus 3.0 2.5 - 4.9 mg/dL   Creatine Kinase   Result Value Ref Range    Creatine Kinase 203 0 - 325 U/L   Lactate   Result Value Ref Range    Lactate 1.0 0.4 - 2.0 mmol/L   TSH with reflex to Free T4 if abnormal   Result Value Ref Range    Thyroid Stimulating Hormone 2.00 0.44 - 3.98 mIU/L   POCT GLUCOSE   Result Value Ref Range    POCT Glucose 102 (H) 74 - 99 mg/dL   Magnesium   Result Value Ref Range    Magnesium 1.79 1.60 - 2.40 mg/dL   CBC and Auto Differential   Result Value Ref Range    WBC 5.4 4.4 - 11.3 x10*3/uL    nRBC 0.0 0.0 - 0.0 /100 WBCs    RBC 4.26 (L) 4.50 - 5.90 x10*6/uL    Hemoglobin 13.9 13.5 - 17.5 g/dL    Hematocrit 42.1 41.0 - 52.0 %    MCV 99 80 - 100 fL    MCH 32.6 26.0 - 34.0 pg    MCHC 33.0 32.0 - 36.0 g/dL    RDW 13.2 11.5 - 14.5 %    Platelets 255 150 - 450 x10*3/uL    Neutrophils % 47.5 40.0 - 80.0 %    Immature Granulocytes %, Automated 0.9 0.0 - 0.9 %    Lymphocytes % 35.1 13.0 - 44.0 %    Monocytes % 10.2 2.0 - 10.0 %     Eosinophils % 5.4 0.0 - 6.0 %    Basophils % 0.9 0.0 - 2.0 %    Neutrophils Absolute 2.56 1.20 - 7.70 x10*3/uL    Immature Granulocytes Absolute, Automated 0.05 0.00 - 0.70 x10*3/uL    Lymphocytes Absolute 1.89 1.20 - 4.80 x10*3/uL    Monocytes Absolute 0.55 0.10 - 1.00 x10*3/uL    Eosinophils Absolute 0.29 0.00 - 0.70 x10*3/uL    Basophils Absolute 0.05 0.00 - 0.10 x10*3/uL   Comprehensive metabolic panel   Result Value Ref Range    Glucose 85 74 - 99 mg/dL    Sodium 137 136 - 145 mmol/L    Potassium 3.9 3.5 - 5.3 mmol/L    Chloride 105 98 - 107 mmol/L    Bicarbonate 23 21 - 32 mmol/L    Anion Gap 13 10 - 20 mmol/L    Urea Nitrogen 15 6 - 23 mg/dL    Creatinine 0.61 0.50 - 1.30 mg/dL    eGFR >90 >60 mL/min/1.73m*2    Calcium 9.3 8.6 - 10.3 mg/dL    Albumin 3.5 3.4 - 5.0 g/dL    Alkaline Phosphatase 53 33 - 120 U/L    Total Protein 6.6 6.4 - 8.2 g/dL    AST 11 9 - 39 U/L    Bilirubin, Total 0.7 0.0 - 1.2 mg/dL    ALT 6 (L) 10 - 52 U/L   Phosphorus   Result Value Ref Range    Phosphorus 2.9 2.5 - 4.9 mg/dL   ECG 12 Lead   Result Value Ref Range    Ventricular Rate 108 BPM    Atrial Rate 108 BPM    WV Interval 160 ms    QRS Duration 78 ms    QT Interval 380 ms    QTC Calculation(Bazett) 509 ms    P Axis 55 degrees    R Axis 36 degrees    T Axis 76 degrees    QRS Count 18 beats    Q Onset 218 ms    P Onset 138 ms    P Offset 197 ms    T Offset 408 ms    QTC Fredericia 462 ms   Basic Metabolic Panel   Result Value Ref Range    Glucose 109 (H) 74 - 99 mg/dL    Sodium 136 136 - 145 mmol/L    Potassium 4.0 3.5 - 5.3 mmol/L    Chloride 105 98 - 107 mmol/L    Bicarbonate 24 21 - 32 mmol/L    Anion Gap 11 10 - 20 mmol/L    Urea Nitrogen 16 6 - 23 mg/dL    Creatinine 0.65 0.50 - 1.30 mg/dL    eGFR >90 >60 mL/min/1.73m*2    Calcium 9.6 8.6 - 10.3 mg/dL   Magnesium   Result Value Ref Range    Magnesium 1.71 1.60 - 2.40 mg/dL   Magnesium   Result Value Ref Range    Magnesium 1.61 1.60 - 2.40 mg/dL   CBC and Auto Differential    Result Value Ref Range    WBC 7.0 4.4 - 11.3 x10*3/uL    nRBC 0.0 0.0 - 0.0 /100 WBCs    RBC 4.12 (L) 4.50 - 5.90 x10*6/uL    Hemoglobin 13.4 (L) 13.5 - 17.5 g/dL    Hematocrit 40.5 (L) 41.0 - 52.0 %    MCV 98 80 - 100 fL    MCH 32.5 26.0 - 34.0 pg    MCHC 33.1 32.0 - 36.0 g/dL    RDW 13.2 11.5 - 14.5 %    Platelets 263 150 - 450 x10*3/uL    Neutrophils % 53.1 40.0 - 80.0 %    Immature Granulocytes %, Automated 0.7 0.0 - 0.9 %    Lymphocytes % 31.7 13.0 - 44.0 %    Monocytes % 10.3 2.0 - 10.0 %    Eosinophils % 3.3 0.0 - 6.0 %    Basophils % 0.9 0.0 - 2.0 %    Neutrophils Absolute 3.73 1.20 - 7.70 x10*3/uL    Immature Granulocytes Absolute, Automated 0.05 0.00 - 0.70 x10*3/uL    Lymphocytes Absolute 2.22 1.20 - 4.80 x10*3/uL    Monocytes Absolute 0.72 0.10 - 1.00 x10*3/uL    Eosinophils Absolute 0.23 0.00 - 0.70 x10*3/uL    Basophils Absolute 0.06 0.00 - 0.10 x10*3/uL   Comprehensive metabolic panel   Result Value Ref Range    Glucose 97 74 - 99 mg/dL    Sodium 138 136 - 145 mmol/L    Potassium 3.9 3.5 - 5.3 mmol/L    Chloride 108 (H) 98 - 107 mmol/L    Bicarbonate 22 21 - 32 mmol/L    Anion Gap 12 10 - 20 mmol/L    Urea Nitrogen 22 6 - 23 mg/dL    Creatinine 0.64 0.50 - 1.30 mg/dL    eGFR >90 >60 mL/min/1.73m*2    Calcium 9.4 8.6 - 10.3 mg/dL    Albumin 3.4 3.4 - 5.0 g/dL    Alkaline Phosphatase 51 33 - 120 U/L    Total Protein 6.4 6.4 - 8.2 g/dL    AST 11 9 - 39 U/L    Bilirubin, Total 0.4 0.0 - 1.2 mg/dL    ALT 9 (L) 10 - 52 U/L   Phosphorus   Result Value Ref Range    Phosphorus 4.5 2.5 - 4.9 mg/dL   Magnesium   Result Value Ref Range    Magnesium 1.60 1.60 - 2.40 mg/dL   CBC and Auto Differential   Result Value Ref Range    WBC 5.3 4.4 - 11.3 x10*3/uL    nRBC 0.0 0.0 - 0.0 /100 WBCs    RBC 4.10 (L) 4.50 - 5.90 x10*6/uL    Hemoglobin 13.2 (L) 13.5 - 17.5 g/dL    Hematocrit 39.6 (L) 41.0 - 52.0 %    MCV 97 80 - 100 fL    MCH 32.2 26.0 - 34.0 pg    MCHC 33.3 32.0 - 36.0 g/dL    RDW 13.2 11.5 - 14.5 %     Platelets 255 150 - 450 x10*3/uL    Neutrophils % 38.7 40.0 - 80.0 %    Immature Granulocytes %, Automated 0.8 0.0 - 0.9 %    Lymphocytes % 44.9 13.0 - 44.0 %    Monocytes % 10.0 2.0 - 10.0 %    Eosinophils % 4.7 0.0 - 6.0 %    Basophils % 0.9 0.0 - 2.0 %    Neutrophils Absolute 2.04 1.20 - 7.70 x10*3/uL    Immature Granulocytes Absolute, Automated 0.04 0.00 - 0.70 x10*3/uL    Lymphocytes Absolute 2.37 1.20 - 4.80 x10*3/uL    Monocytes Absolute 0.53 0.10 - 1.00 x10*3/uL    Eosinophils Absolute 0.25 0.00 - 0.70 x10*3/uL    Basophils Absolute 0.05 0.00 - 0.10 x10*3/uL   Comprehensive metabolic panel   Result Value Ref Range    Glucose 87 74 - 99 mg/dL    Sodium 137 136 - 145 mmol/L    Potassium 3.5 3.5 - 5.3 mmol/L    Chloride 107 98 - 107 mmol/L    Bicarbonate 23 21 - 32 mmol/L    Anion Gap 11 10 - 20 mmol/L    Urea Nitrogen 15 6 - 23 mg/dL    Creatinine 0.60 0.50 - 1.30 mg/dL    eGFR >90 >60 mL/min/1.73m*2    Calcium 8.8 8.6 - 10.3 mg/dL    Albumin 3.3 (L) 3.4 - 5.0 g/dL    Alkaline Phosphatase 46 33 - 120 U/L    Total Protein 6.1 (L) 6.4 - 8.2 g/dL    AST 10 9 - 39 U/L    Bilirubin, Total 0.5 0.0 - 1.2 mg/dL    ALT 8 (L) 10 - 52 U/L   Phosphorus   Result Value Ref Range    Phosphorus 4.4 2.5 - 4.9 mg/dL         Risk Assessment:  High risk of alcohol relapse and suicide    Impression:  MDD recurrent severe   Alcohol use disorder    Recommendations:    1. Safety:  Maintain sitter     2.  Recommend transfer to inpatient psychiatry unit when medically stable.  [facility] to be contacted.  Pink slip in the chart(accepting hospital name is to be added)    Thank you for allowing us to participate in the care of this patient.       Bora Lama MD  7/1/2024  8:51 PM

## 2024-07-02 NOTE — PROGRESS NOTES
Occupational Therapy                 Therapy Communication Note    Patient Name: Everton Geiegr  MRN: [id number]  Today's Date: 7/2/2024     Discipline: Occupational Therapy    Missed Visit Reason:  Pt deferred tx at this time, requesting to rest in bed, will follow up as time permits.     Missed Time: Attempt    Comment:

## 2024-07-03 ENCOUNTER — HOSPITAL ENCOUNTER (INPATIENT)
Facility: HOSPITAL | Age: 59
End: 2024-07-03
Attending: PSYCHIATRY & NEUROLOGY | Admitting: PSYCHIATRY & NEUROLOGY
Payer: MEDICAID

## 2024-07-03 VITALS
SYSTOLIC BLOOD PRESSURE: 127 MMHG | HEIGHT: 69 IN | OXYGEN SATURATION: 98 % | HEART RATE: 64 BPM | DIASTOLIC BLOOD PRESSURE: 89 MMHG | TEMPERATURE: 98.6 F | BODY MASS INDEX: 25.14 KG/M2 | RESPIRATION RATE: 18 BRPM | WEIGHT: 169.75 LBS

## 2024-07-03 DIAGNOSIS — R10.13 DYSPEPSIA: ICD-10-CM

## 2024-07-03 DIAGNOSIS — F10.90 ALCOHOL USE DISORDER: ICD-10-CM

## 2024-07-03 DIAGNOSIS — F33.2 MDD (MAJOR DEPRESSIVE DISORDER), RECURRENT SEVERE, WITHOUT PSYCHOSIS (MULTI): Primary | ICD-10-CM

## 2024-07-03 LAB
ALBUMIN SERPL BCP-MCNC: 3.4 G/DL (ref 3.4–5)
ALP SERPL-CCNC: 47 U/L (ref 33–120)
ALT SERPL W P-5'-P-CCNC: 9 U/L (ref 10–52)
ANION GAP SERPL CALC-SCNC: 10 MMOL/L (ref 10–20)
AST SERPL W P-5'-P-CCNC: 11 U/L (ref 9–39)
ATRIAL RATE: 136 BPM
BASOPHILS # BLD AUTO: 0.05 X10*3/UL (ref 0–0.1)
BASOPHILS NFR BLD AUTO: 0.8 %
BILIRUB SERPL-MCNC: 0.4 MG/DL (ref 0–1.2)
BUN SERPL-MCNC: 15 MG/DL (ref 6–23)
CALCIUM SERPL-MCNC: 9 MG/DL (ref 8.6–10.3)
CHLORIDE SERPL-SCNC: 107 MMOL/L (ref 98–107)
CO2 SERPL-SCNC: 25 MMOL/L (ref 21–32)
CREAT SERPL-MCNC: 0.64 MG/DL (ref 0.5–1.3)
EGFRCR SERPLBLD CKD-EPI 2021: >90 ML/MIN/1.73M*2
EOSINOPHIL # BLD AUTO: 0.14 X10*3/UL (ref 0–0.7)
EOSINOPHIL NFR BLD AUTO: 2.3 %
ERYTHROCYTE [DISTWIDTH] IN BLOOD BY AUTOMATED COUNT: 13.3 % (ref 11.5–14.5)
GLUCOSE SERPL-MCNC: 87 MG/DL (ref 74–99)
HCT VFR BLD AUTO: 39.2 % (ref 41–52)
HGB BLD-MCNC: 13.3 G/DL (ref 13.5–17.5)
IMM GRANULOCYTES # BLD AUTO: 0.04 X10*3/UL (ref 0–0.7)
IMM GRANULOCYTES NFR BLD AUTO: 0.7 % (ref 0–0.9)
LYMPHOCYTES # BLD AUTO: 2.47 X10*3/UL (ref 1.2–4.8)
LYMPHOCYTES NFR BLD AUTO: 40.9 %
MAGNESIUM SERPL-MCNC: 1.82 MG/DL (ref 1.6–2.4)
MCH RBC QN AUTO: 33.1 PG (ref 26–34)
MCHC RBC AUTO-ENTMCNC: 33.9 G/DL (ref 32–36)
MCV RBC AUTO: 98 FL (ref 80–100)
MONOCYTES # BLD AUTO: 0.74 X10*3/UL (ref 0.1–1)
MONOCYTES NFR BLD AUTO: 12.3 %
NEUTROPHILS # BLD AUTO: 2.6 X10*3/UL (ref 1.2–7.7)
NEUTROPHILS NFR BLD AUTO: 43 %
NRBC BLD-RTO: 0 /100 WBCS (ref 0–0)
PHOSPHATE SERPL-MCNC: 3.8 MG/DL (ref 2.5–4.9)
PLATELET # BLD AUTO: 256 X10*3/UL (ref 150–450)
POTASSIUM SERPL-SCNC: 3.3 MMOL/L (ref 3.5–5.3)
PR INTERVAL: 104 MS
PROT SERPL-MCNC: 6.2 G/DL (ref 6.4–8.2)
Q ONSET: 220 MS
QRS COUNT: 22 BEATS
QRS DURATION: 90 MS
QT INTERVAL: 376 MS
QTC CALCULATION(BAZETT): 565 MS
QTC FREDERICIA: 493 MS
R AXIS: 77 DEGREES
RBC # BLD AUTO: 4.02 X10*6/UL (ref 4.5–5.9)
SODIUM SERPL-SCNC: 139 MMOL/L (ref 136–145)
T AXIS: 77 DEGREES
T OFFSET: 408 MS
VENTRICULAR RATE: 136 BPM
WBC # BLD AUTO: 6 X10*3/UL (ref 4.4–11.3)

## 2024-07-03 PROCEDURE — 97165 OT EVAL LOW COMPLEX 30 MIN: CPT | Mod: GO

## 2024-07-03 PROCEDURE — 99222 1ST HOSP IP/OBS MODERATE 55: CPT | Performed by: REGISTERED NURSE

## 2024-07-03 PROCEDURE — 85025 COMPLETE CBC W/AUTO DIFF WBC: CPT | Performed by: PHYSICIAN ASSISTANT

## 2024-07-03 PROCEDURE — 2500000001 HC RX 250 WO HCPCS SELF ADMINISTERED DRUGS (ALT 637 FOR MEDICARE OP): Performed by: INTERNAL MEDICINE

## 2024-07-03 PROCEDURE — 2500000001 HC RX 250 WO HCPCS SELF ADMINISTERED DRUGS (ALT 637 FOR MEDICARE OP): Performed by: PHYSICIAN ASSISTANT

## 2024-07-03 PROCEDURE — 83735 ASSAY OF MAGNESIUM: CPT | Performed by: PHYSICIAN ASSISTANT

## 2024-07-03 PROCEDURE — 36415 COLL VENOUS BLD VENIPUNCTURE: CPT | Performed by: PHYSICIAN ASSISTANT

## 2024-07-03 PROCEDURE — 2500000004 HC RX 250 GENERAL PHARMACY W/ HCPCS (ALT 636 FOR OP/ED): Performed by: PHYSICIAN ASSISTANT

## 2024-07-03 PROCEDURE — 84100 ASSAY OF PHOSPHORUS: CPT | Performed by: PHYSICIAN ASSISTANT

## 2024-07-03 PROCEDURE — 97150 GROUP THERAPEUTIC PROCEDURES: CPT | Mod: GO

## 2024-07-03 PROCEDURE — 1240000001 HC SEMI-PRIVATE BH ROOM DAILY

## 2024-07-03 PROCEDURE — 80053 COMPREHEN METABOLIC PANEL: CPT | Performed by: PHYSICIAN ASSISTANT

## 2024-07-03 PROCEDURE — 2500000001 HC RX 250 WO HCPCS SELF ADMINISTERED DRUGS (ALT 637 FOR MEDICARE OP): Performed by: REGISTERED NURSE

## 2024-07-03 RX ORDER — TRAZODONE HYDROCHLORIDE 50 MG/1
50 TABLET ORAL NIGHTLY PRN
Status: DISCONTINUED | OUTPATIENT
Start: 2024-07-03 | End: 2024-07-09 | Stop reason: HOSPADM

## 2024-07-03 RX ORDER — ACETAMINOPHEN 325 MG/1
650 TABLET ORAL EVERY 6 HOURS PRN
Status: DISCONTINUED | OUTPATIENT
Start: 2024-07-03 | End: 2024-07-09 | Stop reason: HOSPADM

## 2024-07-03 RX ORDER — NALTREXONE HYDROCHLORIDE 50 MG/1
50 TABLET, FILM COATED ORAL DAILY
Status: DISCONTINUED | OUTPATIENT
Start: 2024-07-03 | End: 2024-07-07

## 2024-07-03 RX ORDER — OLANZAPINE 10 MG/2ML
5 INJECTION, POWDER, FOR SOLUTION INTRAMUSCULAR EVERY 6 HOURS PRN
Status: DISCONTINUED | OUTPATIENT
Start: 2024-07-03 | End: 2024-07-09 | Stop reason: HOSPADM

## 2024-07-03 RX ORDER — OLANZAPINE 5 MG/1
5 TABLET ORAL EVERY 6 HOURS PRN
Status: DISCONTINUED | OUTPATIENT
Start: 2024-07-03 | End: 2024-07-09 | Stop reason: HOSPADM

## 2024-07-03 RX ORDER — DULOXETIN HYDROCHLORIDE 30 MG/1
30 CAPSULE, DELAYED RELEASE ORAL NIGHTLY
Status: DISCONTINUED | OUTPATIENT
Start: 2024-07-03 | End: 2024-07-04

## 2024-07-03 RX ORDER — ALUMINUM HYDROXIDE, MAGNESIUM HYDROXIDE, AND SIMETHICONE 1200; 120; 1200 MG/30ML; MG/30ML; MG/30ML
10 SUSPENSION ORAL 4 TIMES DAILY PRN
Status: DISCONTINUED | OUTPATIENT
Start: 2024-07-03 | End: 2024-07-09 | Stop reason: HOSPADM

## 2024-07-03 RX ORDER — IBUPROFEN 600 MG/1
600 TABLET ORAL EVERY 6 HOURS PRN
Status: DISCONTINUED | OUTPATIENT
Start: 2024-07-03 | End: 2024-07-09 | Stop reason: HOSPADM

## 2024-07-03 RX ORDER — HYDROXYZINE HYDROCHLORIDE 25 MG/1
50 TABLET, FILM COATED ORAL EVERY 6 HOURS PRN
Status: DISCONTINUED | OUTPATIENT
Start: 2024-07-03 | End: 2024-07-09 | Stop reason: HOSPADM

## 2024-07-03 SDOH — SOCIAL STABILITY: SOCIAL INSECURITY: DO YOU FEEL UNSAFE GOING BACK TO THE PLACE WHERE YOU ARE LIVING?: NO

## 2024-07-03 SDOH — ECONOMIC STABILITY: INCOME INSECURITY: IN THE LAST 12 MONTHS, WAS THERE A TIME WHEN YOU WERE NOT ABLE TO PAY THE MORTGAGE OR RENT ON TIME?: YES

## 2024-07-03 SDOH — SOCIAL STABILITY: SOCIAL NETWORK: HOW OFTEN DO YOU GET TOGETHER WITH FRIENDS OR RELATIVES?: NEVER

## 2024-07-03 SDOH — SOCIAL STABILITY: SOCIAL NETWORK: ARE YOU MARRIED, WIDOWED, DIVORCED, SEPARATED, NEVER MARRIED, OR LIVING WITH A PARTNER?: SEPARATED

## 2024-07-03 SDOH — ECONOMIC STABILITY: HOUSING INSECURITY
IN THE LAST 12 MONTHS, WAS THERE A TIME WHEN YOU DID NOT HAVE A STEADY PLACE TO SLEEP OR SLEPT IN A SHELTER (INCLUDING NOW)?: NO

## 2024-07-03 SDOH — SOCIAL STABILITY: SOCIAL INSECURITY: HAS ANYONE EVER THREATENED TO HURT YOUR FAMILY OR YOUR PETS?: NO

## 2024-07-03 SDOH — ECONOMIC STABILITY: HOUSING INSECURITY: IN THE LAST 12 MONTHS, HOW MANY PLACES HAVE YOU LIVED?: 3

## 2024-07-03 SDOH — ECONOMIC STABILITY: INCOME INSECURITY: HOW HARD IS IT FOR YOU TO PAY FOR THE VERY BASICS LIKE FOOD, HOUSING, MEDICAL CARE, AND HEATING?: NOT HARD AT ALL

## 2024-07-03 SDOH — SOCIAL STABILITY: SOCIAL INSECURITY: POSSIBLE ABUSE REPORTED TO:: OTHER (COMMENT)

## 2024-07-03 SDOH — SOCIAL STABILITY: SOCIAL INSECURITY: WERE YOU ABLE TO COMPLETE ALL THE BEHAVIORAL HEALTH SCREENINGS?: YES

## 2024-07-03 SDOH — SOCIAL STABILITY: SOCIAL INSECURITY: ARE THERE ANY APPARENT SIGNS OF INJURIES/BEHAVIORS THAT COULD BE RELATED TO ABUSE/NEGLECT?: NO

## 2024-07-03 SDOH — SOCIAL STABILITY: SOCIAL INSECURITY
WITHIN THE LAST YEAR, HAVE TO BEEN RAPED OR FORCED TO HAVE ANY KIND OF SEXUAL ACTIVITY BY YOUR PARTNER OR EX-PARTNER?: NO

## 2024-07-03 SDOH — HEALTH STABILITY: MENTAL HEALTH: EXPERIENCED ANY OF THE FOLLOWING LIFE EVENTS: SOCIAL LOSS (BANKRUPTCY, DIVORCE, WORK-RELATED STRESS);SEXUAL ASSAULT

## 2024-07-03 SDOH — SOCIAL STABILITY: SOCIAL NETWORK
DO YOU BELONG TO ANY CLUBS OR ORGANIZATIONS SUCH AS CHURCH GROUPS UNIONS, FRATERNAL OR ATHLETIC GROUPS, OR SCHOOL GROUPS?: NO

## 2024-07-03 SDOH — SOCIAL STABILITY: SOCIAL INSECURITY
WITHIN THE LAST YEAR, HAVE YOU BEEN KICKED, HIT, SLAPPED, OR OTHERWISE PHYSICALLY HURT BY YOUR PARTNER OR EX-PARTNER?: NO

## 2024-07-03 SDOH — HEALTH STABILITY: PHYSICAL HEALTH: ON AVERAGE, HOW MANY MINUTES DO YOU ENGAGE IN EXERCISE AT THIS LEVEL?: 30 MIN

## 2024-07-03 SDOH — SOCIAL STABILITY: SOCIAL INSECURITY: DOES ANYONE TRY TO KEEP YOU FROM HAVING/CONTACTING OTHER FRIENDS OR DOING THINGS OUTSIDE YOUR HOME?: NO

## 2024-07-03 SDOH — ECONOMIC STABILITY: FOOD INSECURITY: WITHIN THE PAST 12 MONTHS, THE FOOD YOU BOUGHT JUST DIDN'T LAST AND YOU DIDN'T HAVE MONEY TO GET MORE.: NEVER TRUE

## 2024-07-03 SDOH — SOCIAL STABILITY: SOCIAL INSECURITY: WITHIN THE LAST YEAR, HAVE YOU BEEN HUMILIATED OR EMOTIONALLY ABUSED IN OTHER WAYS BY YOUR PARTNER OR EX-PARTNER?: NO

## 2024-07-03 SDOH — ECONOMIC STABILITY: FOOD INSECURITY: WITHIN THE PAST 12 MONTHS, YOU WORRIED THAT YOUR FOOD WOULD RUN OUT BEFORE YOU GOT MONEY TO BUY MORE.: NEVER TRUE

## 2024-07-03 SDOH — SOCIAL STABILITY: SOCIAL NETWORK
IN A TYPICAL WEEK, HOW MANY TIMES DO YOU TALK ON THE PHONE WITH FAMILY, FRIENDS, OR NEIGHBORS?: MORE THAN THREE TIMES A WEEK

## 2024-07-03 SDOH — HEALTH STABILITY: MENTAL HEALTH
STRESS IS WHEN SOMEONE FEELS TENSE, NERVOUS, ANXIOUS, OR CAN'T SLEEP AT NIGHT BECAUSE THEIR MIND IS TROUBLED. HOW STRESSED ARE YOU?: RATHER MUCH

## 2024-07-03 SDOH — SOCIAL STABILITY: SOCIAL INSECURITY: HAVE YOU HAD ANY THOUGHTS OF HARMING ANYONE ELSE?: NO

## 2024-07-03 SDOH — HEALTH STABILITY: MENTAL HEALTH
HOW OFTEN DO YOU NEED TO HAVE SOMEONE HELP YOU WHEN YOU READ INSTRUCTIONS, PAMPHLETS, OR OTHER WRITTEN MATERIAL FROM YOUR DOCTOR OR PHARMACY?: RARELY

## 2024-07-03 SDOH — SOCIAL STABILITY: SOCIAL INSECURITY: ARE YOU OR HAVE YOU BEEN THREATENED OR ABUSED PHYSICALLY, EMOTIONALLY, OR SEXUALLY BY ANYONE?: NO

## 2024-07-03 SDOH — SOCIAL STABILITY: SOCIAL INSECURITY: ABUSE: ADULT

## 2024-07-03 SDOH — SOCIAL STABILITY: SOCIAL INSECURITY: HAVE YOU HAD THOUGHTS OF HARMING ANYONE ELSE?: NO

## 2024-07-03 SDOH — ECONOMIC STABILITY: TRANSPORTATION INSECURITY
IN THE PAST 12 MONTHS, HAS THE LACK OF TRANSPORTATION KEPT YOU FROM MEDICAL APPOINTMENTS OR FROM GETTING MEDICATIONS?: NO

## 2024-07-03 SDOH — SOCIAL STABILITY: SOCIAL NETWORK: HOW OFTEN DO YOU ATTENT MEETINGS OF THE CLUB OR ORGANIZATION YOU BELONG TO?: NEVER

## 2024-07-03 SDOH — SOCIAL STABILITY: SOCIAL NETWORK: HOW OFTEN DO YOU ATTEND CHURCH OR RELIGIOUS SERVICES?: NEVER

## 2024-07-03 SDOH — ECONOMIC STABILITY: INCOME INSECURITY: IN THE PAST 12 MONTHS, HAS THE ELECTRIC, GAS, OIL, OR WATER COMPANY THREATENED TO SHUT OFF SERVICE IN YOUR HOME?: NO

## 2024-07-03 SDOH — SOCIAL STABILITY: SOCIAL INSECURITY: WITHIN THE LAST YEAR, HAVE YOU BEEN AFRAID OF YOUR PARTNER OR EX-PARTNER?: NO

## 2024-07-03 SDOH — HEALTH STABILITY: PHYSICAL HEALTH: ON AVERAGE, HOW MANY DAYS PER WEEK DO YOU ENGAGE IN MODERATE TO STRENUOUS EXERCISE (LIKE A BRISK WALK)?: 3 DAYS

## 2024-07-03 SDOH — SOCIAL STABILITY: SOCIAL INSECURITY: DO YOU FEEL ANYONE HAS EXPLOITED OR TAKEN ADVANTAGE OF YOU FINANCIALLY OR OF YOUR PERSONAL PROPERTY?: NO

## 2024-07-03 SDOH — ECONOMIC STABILITY: TRANSPORTATION INSECURITY
IN THE PAST 12 MONTHS, HAS LACK OF TRANSPORTATION KEPT YOU FROM MEETINGS, WORK, OR FROM GETTING THINGS NEEDED FOR DAILY LIVING?: NO

## 2024-07-03 ASSESSMENT — ACTIVITIES OF DAILY LIVING (ADL)
JUDGMENT_ADEQUATE_SAFELY_COMPLETE_DAILY_ACTIVITIES: YES
WALKS IN HOME: INDEPENDENT
BATHING: INDEPENDENT
HEARING - RIGHT EAR: FUNCTIONAL
DRESSING YOURSELF: INDEPENDENT
ASSISTIVE_DEVICE: EYEGLASSES
FEEDING YOURSELF: INDEPENDENT
PATIENT'S MEMORY ADEQUATE TO SAFELY COMPLETE DAILY ACTIVITIES?: YES
GROOMING: INDEPENDENT
ADEQUATE_TO_COMPLETE_ADL: YES
TOILETING: INDEPENDENT
HEARING - LEFT EAR: FUNCTIONAL

## 2024-07-03 ASSESSMENT — PATIENT HEALTH QUESTIONNAIRE - PHQ9
SUM OF ALL RESPONSES TO PHQ9 QUESTIONS 1 & 2: 4
1. LITTLE INTEREST OR PLEASURE IN DOING THINGS: MORE THAN HALF THE DAYS
2. FEELING DOWN, DEPRESSED OR HOPELESS: MORE THAN HALF THE DAYS

## 2024-07-03 ASSESSMENT — LIFESTYLE VARIABLES
HOW OFTEN DURING THE LAST YEAR HAVE YOU HAD A FEELING OF GUILT OR REMORSE AFTER DRINKING: DAILY OR ALMOST DAILY
ANXIETY: NO ANXIETY, AT EASE
AUDITORY DISTURBANCES: NOT PRESENT
ANXIETY: NO ANXIETY, AT EASE
VISUAL DISTURBANCES: NOT PRESENT
HEADACHE, FULLNESS IN HEAD: NOT PRESENT
NAUSEA AND VOMITING: NO NAUSEA AND NO VOMITING
AUDIT TOTAL SCORE: 22
TREMOR: NO TREMOR
TOTAL SCORE: 0
AUDIT-C TOTAL SCORE: 10
HAS A RELATIVE, FRIEND, DOCTOR, OR ANOTHER HEALTH PROFESSIONAL EXPRESSED CONCERN ABOUT YOUR DRINKING OR SUGGESTED YOU CUT DOWN: YES, DURING THE LAST YEAR
ORIENTATION AND CLOUDING OF SENSORIUM: ORIENTED AND CAN DO SERIAL ADDITIONS
PAROXYSMAL SWEATS: NO SWEAT VISIBLE
AUDIT TOTAL SCORE: 32
HOW OFTEN DURING THE LAST YEAR HAVE YOU FOUND THAT YOU WERE NOT ABLE TO STOP DRINKING ONCE YOU HAD STARTED: DAILY OR ALMOST DAILY
HAVE YOU OR SOMEONE ELSE BEEN INJURED AS A RESULT OF YOUR DRINKING: YES, DURING THE LAST YEAR
PAROXYSMAL SWEATS: NO SWEAT VISIBLE
TREMOR: NO TREMOR
TREMOR: NO TREMOR
AGITATION: NORMAL ACTIVITY
AUDITORY DISTURBANCES: NOT PRESENT
HOW OFTEN DURING THE LAST YEAR HAVE YOU BEEN UNABLE TO REMEMBER WHAT HAPPENED THE NIGHT BEFORE BECAUSE YOU HAD BEEN DRINKING: MONTHLY
HOW OFTEN DO YOU HAVE A DRINK CONTAINING ALCOHOL: 4 OR MORE TIMES A WEEK
VISUAL DISTURBANCES: NOT PRESENT
HOW MANY STANDARD DRINKS CONTAINING ALCOHOL DO YOU HAVE ON A TYPICAL DAY: 5 OR 6
NAUSEA AND VOMITING: NO NAUSEA AND NO VOMITING
PULSE: 71
ORIENTATION AND CLOUDING OF SENSORIUM: ORIENTED AND CAN DO SERIAL ADDITIONS
VISUAL DISTURBANCES: NOT PRESENT
AUDIT-C TOTAL SCORE: 10
VISUAL DISTURBANCES: NOT PRESENT
TOTAL_SCORE: 0
SUBSTANCE_ABUSE_PAST_12_MONTHS: NO
ANXIETY: MILDLY ANXIOUS
PAROXYSMAL SWEATS: NO SWEAT VISIBLE
AGITATION: NORMAL ACTIVITY
SKIP TO QUESTIONS 9-10: 0
NAUSEA AND VOMITING: NO NAUSEA AND NO VOMITING
AUDITORY DISTURBANCES: NOT PRESENT
TOTAL SCORE: 0
BLOOD PRESSURE: 154/97
ORIENTATION AND CLOUDING OF SENSORIUM: ORIENTED AND CAN DO SERIAL ADDITIONS
HEADACHE, FULLNESS IN HEAD: NOT PRESENT
HOW OFTEN DO YOU HAVE 6 OR MORE DRINKS ON ONE OCCASION: DAILY OR ALMOST DAILY
AUDITORY DISTURBANCES: NOT PRESENT
TOTAL SCORE: 0
PRESCIPTION_ABUSE_PAST_12_MONTHS: YES
HEADACHE, FULLNESS IN HEAD: NOT PRESENT
HEADACHE, FULLNESS IN HEAD: NOT PRESENT
TREMOR: NOT VISIBLE, BUT CAN BE FELT FINGERTIP TO FINGERTIP
HOW OFTEN DURING THE LAST YEAR HAVE YOU FAILED TO DO WHAT WAS NORMALLY EXPECTED FROM YOU BECAUSE OF DRINKING: MONTHLY
CIWA TOTAL SCORE: 2
NAUSEA AND VOMITING: NO NAUSEA AND NO VOMITING
AGITATION: NORMAL ACTIVITY
AGITATION: NORMAL ACTIVITY
ANXIETY: NO ANXIETY, AT EASE
HOW OFTEN DURING THE LAST YEAR HAVE YOU NEEDED AN ALCOHOLIC DRINK FIRST THING IN THE MORNING TO GET YOURSELF GOING AFTER A NIGHT OF HEAVY DRINKING: MONTHLY
PAROXYSMAL SWEATS: NO SWEAT VISIBLE
ORIENTATION AND CLOUDING OF SENSORIUM: ORIENTED AND CAN DO SERIAL ADDITIONS
PULSE: 68

## 2024-07-03 ASSESSMENT — COGNITIVE AND FUNCTIONAL STATUS - GENERAL
WALKING IN HOSPITAL ROOM: A LITTLE
CLIMB 3 TO 5 STEPS WITH RAILING: A LITTLE
MOBILITY SCORE: 22
DAILY ACTIVITIY SCORE: 24

## 2024-07-03 ASSESSMENT — COLUMBIA-SUICIDE SEVERITY RATING SCALE - C-SSRS
1. SINCE LAST CONTACT, HAVE YOU WISHED YOU WERE DEAD OR WISHED YOU COULD GO TO SLEEP AND NOT WAKE UP?: NO
5. HAVE YOU STARTED TO WORK OUT OR WORKED OUT THE DETAILS OF HOW TO KILL YOURSELF? DO YOU INTEND TO CARRY OUT THIS PLAN?: NO
6. HAVE YOU EVER DONE ANYTHING, STARTED TO DO ANYTHING, OR PREPARED TO DO ANYTHING TO END YOUR LIFE?: NO
5. HAVE YOU STARTED TO WORK OUT OR WORKED OUT THE DETAILS OF HOW TO KILL YOURSELF? DO YOU INTEND TO CARRY OUT THIS PLAN?: NO
6. HAVE YOU EVER DONE ANYTHING, STARTED TO DO ANYTHING, OR PREPARED TO DO ANYTHING TO END YOUR LIFE?: NO
2. HAVE YOU ACTUALLY HAD ANY THOUGHTS OF KILLING YOURSELF?: YES
1. SINCE LAST CONTACT, HAVE YOU WISHED YOU WERE DEAD OR WISHED YOU COULD GO TO SLEEP AND NOT WAKE UP?: NO
5. HAVE YOU STARTED TO WORK OUT OR WORKED OUT THE DETAILS OF HOW TO KILL YOURSELF? DO YOU INTEND TO CARRY OUT THIS PLAN?: NO
2. HAVE YOU ACTUALLY HAD ANY THOUGHTS OF KILLING YOURSELF?: NO
6. HAVE YOU EVER DONE ANYTHING, STARTED TO DO ANYTHING, OR PREPARED TO DO ANYTHING TO END YOUR LIFE?: NO
1. SINCE LAST CONTACT, HAVE YOU WISHED YOU WERE DEAD OR WISHED YOU COULD GO TO SLEEP AND NOT WAKE UP?: NO
2. HAVE YOU ACTUALLY HAD ANY THOUGHTS OF KILLING YOURSELF?: YES

## 2024-07-03 ASSESSMENT — PAIN SCALES - GENERAL
PAINLEVEL_OUTOF10: 0 - NO PAIN

## 2024-07-03 ASSESSMENT — PAIN - FUNCTIONAL ASSESSMENT
PAIN_FUNCTIONAL_ASSESSMENT: 0-10
PAIN_FUNCTIONAL_ASSESSMENT: 0-10

## 2024-07-03 NOTE — PROGRESS NOTES
Physical Therapy                 Therapy Communication Note    Patient Name: Everton Geiger  MRN: [id number]  Today's Date: 7/3/2024     Discipline: Physical Therapy    Missed Visit Reason: Missed Visit Reason: Parent refused (patient politely declined citing fatigue from not sleeping last night as well as a headache.)    Missed Time: Attempt    Comment:

## 2024-07-03 NOTE — PROGRESS NOTES
"Occupational Therapy     REHAB Therapy Assessment & Treatment    Patient Name: Everton Geiger  MRN: [id number]  Today's Date: 7/3/2024      Activity:  Conflict Resolution & Anger Management Group    Attendance:  Attendance  Activity: Discussion/reminisce  Participation: Active participation    Treatment Approach  Approach : Group therapy sessions  Patient Stated Goals: none stated  Cognition: Attention, Directions (Pt alert, oriented, & attentive. Follows simple multi-step directions independently.)  Social Skills: Demonstrates ability to listen to others  Emotional: Mood (Pt mood calm & friendly but low, affect animated & appropriate)  Treatment Approach Comments: Pt attended & participated in afternoon therapy group focused on interpersonal conflict resolution & anger management. First, reviewed this morning’s group on assertive communication & connected the assertive communication tips to 2 models for interpersonal problem-solving: the “8-stage conflict resolution model” & “ROYER.” Reviewed & discussed these models. Then, pt educated on the need for additional anger management strategies for instances when assertive communication does not solve the problem. Pt asked about skills they already use to cope w/ anger. Pt responded that he wasn't sure because he hadn't gotten mad at anyone in a while, \"I just let things go\". Pt educated on a variety of healthy anger coping mechanics, including constructive methods, empty chair exercise, letter writing, positive self-talk, & relaxation strategies. Pt then invited to participate in practicing some of these coping methods. Pt given a variety of options of coping skills to practice, including letter-writing templates, mandala coloring sheets & coping skills word searches for relaxation, & gratitude journal sheets. Pt selected mandala coloring page & engaged in this activity for remainder of group time. Afterward, pt reported enjoying the activity. Pt shared 1 skill from " "today that they would like to apply more  in their daily lives, which was, \"Pick my battles\". Pt demonstrates good understanding.      Encounter Problems       Encounter Problems (Active)       OT Goals       Pt will explore, identify, and appropriately utilize effective coping strategies to cope with daily stressors and manage emotions with independence prior to discharge.  (Progressing)       Start:  07/03/24    Expected End:  07/31/24            Pt will demonstrate ability to appropriately modulate emotions and impulses with independence prior to discharge.  (Progressing)       Start:  07/03/24    Expected End:  07/31/24            Pt will explore, identify, and appropriately utilize effective communication strategies to express feelings, wants, and needs with independence prior to discharge.  (Progressing)       Start:  07/03/24    Expected End:  07/31/24            Pt will develop a Relapse Prevention Plan to reflect on possible stressors & plan for healthy coping prior to discharge. (Progressing)       Start:  07/03/24    Expected End:  07/31/24                 Education:  Pt given educational handouts on interpersonal conflict resolution & healthy anger management strategies. Reviewed, discussed, & practiced. Pt demonstrates good understanding.       "

## 2024-07-03 NOTE — PROGRESS NOTES
"Occupational Therapy    OT Behavioral Health Evaluation    Patient Name: Everton Geiger  MRN: [id number]  Today's Date: 7/3/2024   Time in: 1348  Time out: 1406    OT Intervention Plan:  Skilled OT interventions to include: therapeutic use of self, therapeutic use of occupations and activities, therapeutic groups (including task skill groups, life skill groups, coping skill groups, anger/grief management groups, and ADL/IADL groups), and 1:1 sessions focused on individualized goals for mental health management to improve ADL/IADL performance.      Subjective   Current Problem:  No diagnosis found.  General:  General  Reason for Referral: ADL impairment  Referred By: Dr. Small  Past Medical History Relevant to Rehab: depression, alcohol abuse, ETOH withdrawal, anxiety, closed finger dislocation traumatic  Prior to Session Communication: Bedside nurse  Patient Position Received: Up in chair  General Comment: 59 y/o male brought to ED by family w/ altered mental status after being found in his room passed out surrounded by empty alcohol & pill bottles. Pt confirms he drank & took the pills in attempt to end his own life. Pt says, \"I just can't see a future for myself.\" Pt describes dealing w/ depression since he was a teenager, but not getting diagnosed until well into adulthood. Since diagnosis, he has trialed different medications that have not been effective long-term, & has otherwise not had consistent formal treatment for depression. Pt has been self-medicating w/ alcohol. Pt describes circumstances of the past year or so, including losing his job here in [place], finding a job near [place] & moving there, losing that job, coming back to [place] to live w/ his brother & brother's wife, getting another job and finding a new place to live, but then his new place to live falling through after he'd already told family he was moving out. Housing plans falling through felt like the \"last straw\" for pt. That weekend, family he was " "living w/ left town on vacation, leaving pt alone. Pt proceeded to consume a lot of alcohol & take various medications including benzos. Pt's family found him in his room. During assessment, pt endorses feelings of hopelessness & worthlessness. Says he is stressed about finding a place to live as family wants him out, he has no friends in the area, & is worried being out of work for this hospitalization will cause him to lose his job. He is having trouble finding motivation at this time, but is open to pursuing treatment options.    Precautions:     Meaningful Occupations:  Brother, friend, . Works in a law office. Says he used to enjoy cooking as a hobby but \"it became a chore.\"     Sources of Support:    Minimal per pt. Brother & sister-in-law let pt live in their home temporarily but he says they want him out. Says he has almost no friends in the area & that other family \"doesn't want to do with me.\"     Prior Level of Function/Living Situation:    Activities of Daily Living/Self Care  Living Situation: lives w/ family - needs to find alternate living situation   Hygiene/Grooming: independence  Bathing: independence  Dressing: independence  Toileting: independence  Continence: independence  Feeding: independence  Functional Mobility: independence  Medication Use/Follows Medical Advice: noncompliant  ADL Comment:      Instrumental Activities of Daily Living  Parenting/: N/a   Driving/Community Mobility: impaired - Pt says car was repossessed when he was in [place]  Financial Management: impaired  Physical Self-care : impaired  Emotional Self-care: impaired  Home Care/Chores: WFL  Cooking: WFL  Safety Judgement: WFL  Rest/Sleep: impaired  Education:   Bachelor's degree or higher - has a law degree  Work/Volunteering:   full time  IADL/Daily Routine Comment:       Social Participation/Community Involvement:  Socializing: Very minimal. Pt says he has \"no friends\" in this area. He indicates a friend in " "the [place] area & another person who reached out when he was in the hospital. Pt also said he tried engaging in more community activities after a friend encouraged him to get out more, but he felt discouraged due to people being \"cliquey\" & felt like he could not make meaningful connections.  Balanced Relationships:  Single. Currently living w/ brother & sister-in-law. States that brother can be judgmental, to point where pt is afraid to reach out right now & get lectured about his attempt.     Emotional Regulation Skills:  Emotional Expression:  Affect: animated/appropriate  Speech: regular rhythm, rate, volume, & tone  Mood/Impulse Modulation: poor anxiety management and poor depression management  Coping Skills:  Helpful: Minimal at this time. Pt would benefit significantly from increased positive coping skills.   Harmful: substance abuse, self-harm, and suicidal or homicidal ideation    Cognitive & Task Performance Skills:  Orientation: person, place, time, and situation  Attention Span: sustained  Problem-solving: impaired  Decision-making: [facility]  Thought Content: [facility]  Thought Processes: coherent  Organizational Skills: thought processes are linear and organized  Short Term Memory: [facility]  Remote Memory: [facility]  Safety Judgement: [facility]  Perseveration:  not present  Insight/Judgement:  impaired    Communication and Interpersonal Skills:  Boundaries: impaired  Appropriate Disclosure: WFL  Assertion: impaired   Communication/Interpersonal Comment: Pt endorses being fairly passive. Pt could benefit from assertive communication training.      Goals:   Encounter Problems       Encounter Problems (Active)       OT Goals       Pt will explore, identify, and appropriately utilize effective coping strategies to cope with daily stressors and manage emotions with independence prior to discharge.        Start:  07/03/24    Expected End:  07/31/24            Pt will demonstrate ability to appropriately modulate emotions and impulses " with independence prior to discharge.        Start:  07/03/24    Expected End:  07/31/24            Pt will explore, identify, and appropriately utilize effective communication strategies to express feelings, wants, and needs with independence prior to discharge.        Start:  07/03/24    Expected End:  07/31/24            Pt will develop a Relapse Prevention Plan to reflect on possible stressors & plan for healthy coping prior to discharge.       Start:  07/03/24    Expected End:  07/31/24                 Education:  Pt provided overview of stay on inpatient psychiatric unit, including general expectations, occupational therapy's role, and interdisciplinary approach to care. Pt verbalized understanding.

## 2024-07-03 NOTE — SIGNIFICANT EVENT
Application for Emergency Admission      Ready for Transfer?  Is the patient medically cleared for transfer to inpatient psychiatry: Yes  Has the patient been accepted to an inpatient psychiatric hospital: Yes    Application for Emergency Admission  IN ACCORDANCE WITH SECTION 5122.10 [place]  The Chief Clinical Officer of: [facility] 7/3/2024 .9:37 AM    Reason for Hospitalization  The undersigned has reason to believe that: Everton Geiger Is a mentally ill person subject to hospitalization by court order under division B Section 5122.01 of the Revised Code, i.e., this person:    1.Yes  Represents a substantial risk of physical harm to self as manifested by evidence of threats of, or attempts at, suicide or serious self-inflicted bodily harm    2.No Represents a substantial risk of physical harm to others as manifested by evidence of recent homicidal or other violent behavior, evidence of recent threats that place another in reasonable fear of violent behavior and serious physical harm, or other evidence of present dangerousness    3.NO Represents a substantial and immediate risk of serious physical impairment or injury to self as manifested by  evidence that the person is unable to provide for and is not providing for the person's basic physical needs because of the person's mental illness and that appropriate provision for those needs cannot be made  immediately available in the community    4.Yes Would benefit from treatment in a hospital for his mental illness and is in need of such treatment as manifested by evidence of behavior that creates a grave and imminent risk to substantial rights of others or  himself.    5.YES Would benefit from treatment as manifested by evidence of behavior that indicates all of the following:       (a) The person is unlikely to survive safely in the community without supervision, based on a clinical determination.       (b) The person has a history of  lack of compliance with treatment for mental illness and one of the following applies:      (i) At least twice within the thirty-six months prior to the filing of an affidavit seeking court-ordered treatment of the person under section 5122.111 of the Revised Code, the lack of compliance has been a significant factor in necessitating hospitalization in a hospital or receipt of services in a forensic or other mental health unit of a correctional facility, provided that the thirty-six-month period shall be extended by the length of any hospitalization or incarceration of the person that occurred within the thirty-six-month period.      (ii) Within the forty-eight months prior to the filing of an affidavit seeking court-ordered treatment of the person under section 5122.111 of the Revised Code, the lack of compliance resulted in one or more acts of serious violent behavior toward self or others or threats of, or attempts at, serious physical harm to self or others, provided that the forty-eight-month period shall be extended by the length of any hospitalization or incarceration of the person that occurred within the forty-eight-month period.      (c) The person, as a result of mental illness, is unlikely to voluntarily participate in necessary treatment.       (d) In view of the person's treatment history and current behavior, the person is in need of treatment in order to prevent a relapse or deterioration that would be likely to result in substantial risk of serious harm to the person or others.    (e) Represents a substantial risk of physical harm to self or others if allowed to remain at liberty pending examination.    Therefore, it is requested that said person be admitted to the above named facility.    STATEMENT OF BELIEF    Must be filled out by one of the following: a psychiatrist, licensed physician, licensed clinical psychologist, health or ,  or .  (Statement shall include  the circumstances under which the individual was taken into custody and the reason for the person's belief that hospitalization is necessary. The statement shall also include a reference to efforts made to secure the individual's property at his residence if he was taken into custody there. Every reasonable and appropriate effort should be made to take this person into custody in the least conspicuous manner possible.)    Pt with clinical depression and SI, alcoholism, need further psych eval      ARCADIO Lopez 7/3/2024 [facility]    _____________________________________________________________   Place of Employment: [facility]    STATEMENT OF OBSERVATION BY PSYCHIATRIST, LICENSED PHYSICIAN, OR LICENSED CLINICAL PSYCHOLOGIST, IF APPLICABLE    Place of Observation (e.g., [facility], general hospital, office, emergency facility)  (If applicable, please complete)    DIONICIO Lopez-CNP 7/3/2024 [facility]    _____________________________________________________________

## 2024-07-03 NOTE — CARE PLAN
The patient's goals for the shift include complete admit    The clinical goals for the shift include complete admit    Pt participated and was cooperative with admit. Pt states that he had many things come together at once with moving and job losses to finding he wasn't getting an apartment and he impulsively took pills after drinking. Pt states that he feels he has poor support system. Pt says he has an online friend in [place] that he has never met that is the only one that reached out to check on him. Pt is willing to go to tx if can find a way to pay for it. Pt has done many things in life, gone to college and passed bar exams, moved to [place] years. Pt says that now he just doesn't know what to do with himself. Pt encouraged to go to tx. Pt has seen someone at the centers years ago. Tp feels like coming back to [place] was not good for him, does not like it here. Pt up to group after admit, up to dayroom social with peers. Pt states that he has no active suicidal thoughts, just a sense that he does not know what to do with himself. Pt denies homicidal thoughts, denies auditory and visual hallucinations.

## 2024-07-03 NOTE — NURSING NOTE
Patient with no changes this shift. [facility] called and said [facility] accepted, just need pink slip. Spoke to [facility] and they said Dr. Lama needs to finish the pink slip. Dr. Lama will be messaged this morning. Sitter remains at bedside.

## 2024-07-03 NOTE — NURSING NOTE
Pt calm and cooperative through shift. Pt did not complain of pain. Pt rested through shift and was discharged to facility. Meri contact was contacted per pt request and Pt did not want any other family notified.

## 2024-07-03 NOTE — CONSULTS
Nutrition Progress Note    Nutrition consult triggered via nursing admission screen protocol. Consult discontinued - not indicated, per policy as consults to nutrition on this unit are by provider order only. Please reconsult if RD is needed.

## 2024-07-03 NOTE — H&P
History Of Present Illness  Everton Geiger is a 58 y.o. year old male patient with past psych history of MDD and AUD.  Patient initially presented to the ED at [facility] on 6/26 after being found in his room with altered mental status, patient intoxicated with alcohol with bottles of Ativan and Benadryl around him.  Patient initially treated in the ICU at [facility] for alcohol withdrawal and delirium.  Patient medically cleared and transferred to [facility].  Patient admits that he drank alcohol and took the pills with an intention to end his life.  Patient reports he has been struggling with depression and problem alcohol use for many years.  Patient stated that he was living in [place] and working as a .  Patient lost his job and was unable to afford living in the area.  He was forced to move back to [facility] where he is originally from.  Patient states that this was a depressing moment for him.  Prior to moving back he spoke to many family members, and he felt that they did not want him here.  He was initially going to stay with his parents, but they told him he could not stay with them once he arrived.  Was staying with his brother and sister-in-law, but also felt that he was not wanted there.  They went on vacation and that is when he drank alcohol and took the overdose.  Patient reports he has been working to find his own place, was scheduled to move into a place in [place], but this fell through at the last second.  Patient recently got a new job as a , but states that he has been drinking too much alcohol and has missed the first few days of work.  Patient reports he has been drinking approximately a liter of wine daily.  Patient denies any previous suicide attempts.  States he has experienced suicidal ideation in the past.  Patient previously prescribed Lexapro, Prozac, venlafaxine, Seroquel, and Zoloft.  Patient reports that he felt the venlafaxine was most effective, but states that it  eventually stopped working.     Past Medical History  Past Medical History:   Diagnosis Date    Alcohol abuse     Anxiety     Delayed emergence from general anesthesia     Depression     Other specified health status     Known health problems: none       Past Psychiatric History:   Previous therapy: yes  Previous psychiatric hospitalizations: no  Previous diagnoses: yes - MDD, CASA, AUD  Previous suicide attempts: no  History of violence: no    Allergies  No Known Allergies     MSE  General: Appropriately groomed and dressed.  Appearance: Appears stated age.  Attitude: Calm, cooperative.  Behavior: Appropriate eye contact.  Motor activity: No agitation or retardation. no EPS.  Normal gait.  Speech: Regular rate, rhythm, volume and tone.  Mood: Depressed  Affect: Flat  Thought process: Organized, linear, goal-directed.  Associations are logical.  Thought content: Endorses SI   Thought perception: Did not endorse auditory or visual hallucinations.  Cognition: Alert, oriented x3.  no deficit in memory or attention.  Insight: Fair.  Judgment: Fair.    Psychiatric Risk Assessment  Violence Risk Assessment: major mental illness, male, and substance abuse  Acute Risk of Harm to Others is Considered: low   Suicide Risk Assessment: , current psychiatric illness, recent suicide attempt, and substance abuse  Protective Factors against Suicide: employment and social support/connectedness  Acute Risk of Harm to Self is Considered: moderate    Last Recorded Vitals  Vitals:    07/03/24 1206   BP: (!) 154/97   Pulse: 71   Resp:    SpO2:         Relevant Results  Scheduled medications  DULoxetine, 30 mg, oral, Nightly  naltrexone, 50 mg, oral, Daily      Continuous medications     PRN medications  PRN medications: acetaminophen, alum-mag hydroxide-simeth, hydrOXYzine HCL, ibuprofen, OLANZapine, OLANZapine, traZODone     Results for orders placed or performed during the hospital encounter of 06/26/24 (from the past 96  hour(s))   Magnesium   Result Value Ref Range    Magnesium 1.61 1.60 - 2.40 mg/dL   CBC and Auto Differential   Result Value Ref Range    WBC 7.0 4.4 - 11.3 x10*3/uL    nRBC 0.0 0.0 - 0.0 /100 WBCs    RBC 4.12 (L) 4.50 - 5.90 x10*6/uL    Hemoglobin 13.4 (L) 13.5 - 17.5 g/dL    Hematocrit 40.5 (L) 41.0 - 52.0 %    MCV 98 80 - 100 fL    MCH 32.5 26.0 - 34.0 pg    MCHC 33.1 32.0 - 36.0 g/dL    RDW 13.2 11.5 - 14.5 %    Platelets 263 150 - 450 x10*3/uL    Neutrophils % 53.1 40.0 - 80.0 %    Immature Granulocytes %, Automated 0.7 0.0 - 0.9 %    Lymphocytes % 31.7 13.0 - 44.0 %    Monocytes % 10.3 2.0 - 10.0 %    Eosinophils % 3.3 0.0 - 6.0 %    Basophils % 0.9 0.0 - 2.0 %    Neutrophils Absolute 3.73 1.20 - 7.70 x10*3/uL    Immature Granulocytes Absolute, Automated 0.05 0.00 - 0.70 x10*3/uL    Lymphocytes Absolute 2.22 1.20 - 4.80 x10*3/uL    Monocytes Absolute 0.72 0.10 - 1.00 x10*3/uL    Eosinophils Absolute 0.23 0.00 - 0.70 x10*3/uL    Basophils Absolute 0.06 0.00 - 0.10 x10*3/uL   Comprehensive metabolic panel   Result Value Ref Range    Glucose 97 74 - 99 mg/dL    Sodium 138 136 - 145 mmol/L    Potassium 3.9 3.5 - 5.3 mmol/L    Chloride 108 (H) 98 - 107 mmol/L    Bicarbonate 22 21 - 32 mmol/L    Anion Gap 12 10 - 20 mmol/L    Urea Nitrogen 22 6 - 23 mg/dL    Creatinine 0.64 0.50 - 1.30 mg/dL    eGFR >90 >60 mL/min/1.73m*2    Calcium 9.4 8.6 - 10.3 mg/dL    Albumin 3.4 3.4 - 5.0 g/dL    Alkaline Phosphatase 51 33 - 120 U/L    Total Protein 6.4 6.4 - 8.2 g/dL    AST 11 9 - 39 U/L    Bilirubin, Total 0.4 0.0 - 1.2 mg/dL    ALT 9 (L) 10 - 52 U/L   Phosphorus   Result Value Ref Range    Phosphorus 4.5 2.5 - 4.9 mg/dL   Magnesium   Result Value Ref Range    Magnesium 1.60 1.60 - 2.40 mg/dL   CBC and Auto Differential   Result Value Ref Range    WBC 5.3 4.4 - 11.3 x10*3/uL    nRBC 0.0 0.0 - 0.0 /100 WBCs    RBC 4.10 (L) 4.50 - 5.90 x10*6/uL    Hemoglobin 13.2 (L) 13.5 - 17.5 g/dL    Hematocrit 39.6 (L) 41.0 - 52.0 %    MCV  97 80 - 100 fL    MCH 32.2 26.0 - 34.0 pg    MCHC 33.3 32.0 - 36.0 g/dL    RDW 13.2 11.5 - 14.5 %    Platelets 255 150 - 450 x10*3/uL    Neutrophils % 38.7 40.0 - 80.0 %    Immature Granulocytes %, Automated 0.8 0.0 - 0.9 %    Lymphocytes % 44.9 13.0 - 44.0 %    Monocytes % 10.0 2.0 - 10.0 %    Eosinophils % 4.7 0.0 - 6.0 %    Basophils % 0.9 0.0 - 2.0 %    Neutrophils Absolute 2.04 1.20 - 7.70 x10*3/uL    Immature Granulocytes Absolute, Automated 0.04 0.00 - 0.70 x10*3/uL    Lymphocytes Absolute 2.37 1.20 - 4.80 x10*3/uL    Monocytes Absolute 0.53 0.10 - 1.00 x10*3/uL    Eosinophils Absolute 0.25 0.00 - 0.70 x10*3/uL    Basophils Absolute 0.05 0.00 - 0.10 x10*3/uL   Comprehensive metabolic panel   Result Value Ref Range    Glucose 87 74 - 99 mg/dL    Sodium 137 136 - 145 mmol/L    Potassium 3.5 3.5 - 5.3 mmol/L    Chloride 107 98 - 107 mmol/L    Bicarbonate 23 21 - 32 mmol/L    Anion Gap 11 10 - 20 mmol/L    Urea Nitrogen 15 6 - 23 mg/dL    Creatinine 0.60 0.50 - 1.30 mg/dL    eGFR >90 >60 mL/min/1.73m*2    Calcium 8.8 8.6 - 10.3 mg/dL    Albumin 3.3 (L) 3.4 - 5.0 g/dL    Alkaline Phosphatase 46 33 - 120 U/L    Total Protein 6.1 (L) 6.4 - 8.2 g/dL    AST 10 9 - 39 U/L    Bilirubin, Total 0.5 0.0 - 1.2 mg/dL    ALT 8 (L) 10 - 52 U/L   Phosphorus   Result Value Ref Range    Phosphorus 4.4 2.5 - 4.9 mg/dL   Magnesium   Result Value Ref Range    Magnesium 1.75 1.60 - 2.40 mg/dL   CBC and Auto Differential   Result Value Ref Range    WBC 5.3 4.4 - 11.3 x10*3/uL    nRBC 0.0 0.0 - 0.0 /100 WBCs    RBC 4.21 (L) 4.50 - 5.90 x10*6/uL    Hemoglobin 14.4 13.5 - 17.5 g/dL    Hematocrit 40.3 (L) 41.0 - 52.0 %    MCV 96 80 - 100 fL    MCH 34.2 (H) 26.0 - 34.0 pg    MCHC 35.7 32.0 - 36.0 g/dL    RDW 13.3 11.5 - 14.5 %    Platelets 253 150 - 450 x10*3/uL    Neutrophils % 44.3 40.0 - 80.0 %    Immature Granulocytes %, Automated 1.1 (H) 0.0 - 0.9 %    Lymphocytes % 40.6 13.0 - 44.0 %    Monocytes % 10.2 2.0 - 10.0 %    Eosinophils %  3.0 0.0 - 6.0 %    Basophils % 0.8 0.0 - 2.0 %    Neutrophils Absolute 2.35 1.20 - 7.70 x10*3/uL    Immature Granulocytes Absolute, Automated 0.06 0.00 - 0.70 x10*3/uL    Lymphocytes Absolute 2.15 1.20 - 4.80 x10*3/uL    Monocytes Absolute 0.54 0.10 - 1.00 x10*3/uL    Eosinophils Absolute 0.16 0.00 - 0.70 x10*3/uL    Basophils Absolute 0.04 0.00 - 0.10 x10*3/uL   Comprehensive metabolic panel   Result Value Ref Range    Glucose 91 74 - 99 mg/dL    Sodium 139 136 - 145 mmol/L    Potassium 3.9 3.5 - 5.3 mmol/L    Chloride 106 98 - 107 mmol/L    Bicarbonate 24 21 - 32 mmol/L    Anion Gap 13 10 - 20 mmol/L    Urea Nitrogen 16 6 - 23 mg/dL    Creatinine 0.63 0.50 - 1.30 mg/dL    eGFR >90 >60 mL/min/1.73m*2    Calcium 8.9 8.6 - 10.3 mg/dL    Albumin 3.5 3.4 - 5.0 g/dL    Alkaline Phosphatase 49 33 - 120 U/L    Total Protein 6.2 (L) 6.4 - 8.2 g/dL    AST 11 9 - 39 U/L    Bilirubin, Total 0.5 0.0 - 1.2 mg/dL    ALT 9 (L) 10 - 52 U/L   Phosphorus   Result Value Ref Range    Phosphorus 3.5 2.5 - 4.9 mg/dL   Magnesium   Result Value Ref Range    Magnesium 1.82 1.60 - 2.40 mg/dL   CBC and Auto Differential   Result Value Ref Range    WBC 6.0 4.4 - 11.3 x10*3/uL    nRBC 0.0 0.0 - 0.0 /100 WBCs    RBC 4.02 (L) 4.50 - 5.90 x10*6/uL    Hemoglobin 13.3 (L) 13.5 - 17.5 g/dL    Hematocrit 39.2 (L) 41.0 - 52.0 %    MCV 98 80 - 100 fL    MCH 33.1 26.0 - 34.0 pg    MCHC 33.9 32.0 - 36.0 g/dL    RDW 13.3 11.5 - 14.5 %    Platelets 256 150 - 450 x10*3/uL    Neutrophils % 43.0 40.0 - 80.0 %    Immature Granulocytes %, Automated 0.7 0.0 - 0.9 %    Lymphocytes % 40.9 13.0 - 44.0 %    Monocytes % 12.3 2.0 - 10.0 %    Eosinophils % 2.3 0.0 - 6.0 %    Basophils % 0.8 0.0 - 2.0 %    Neutrophils Absolute 2.60 1.20 - 7.70 x10*3/uL    Immature Granulocytes Absolute, Automated 0.04 0.00 - 0.70 x10*3/uL    Lymphocytes Absolute 2.47 1.20 - 4.80 x10*3/uL    Monocytes Absolute 0.74 0.10 - 1.00 x10*3/uL    Eosinophils Absolute 0.14 0.00 - 0.70 x10*3/uL     Basophils Absolute 0.05 0.00 - 0.10 x10*3/uL   Comprehensive metabolic panel   Result Value Ref Range    Glucose 87 74 - 99 mg/dL    Sodium 139 136 - 145 mmol/L    Potassium 3.3 (L) 3.5 - 5.3 mmol/L    Chloride 107 98 - 107 mmol/L    Bicarbonate 25 21 - 32 mmol/L    Anion Gap 10 10 - 20 mmol/L    Urea Nitrogen 15 6 - 23 mg/dL    Creatinine 0.64 0.50 - 1.30 mg/dL    eGFR >90 >60 mL/min/1.73m*2    Calcium 9.0 8.6 - 10.3 mg/dL    Albumin 3.4 3.4 - 5.0 g/dL    Alkaline Phosphatase 47 33 - 120 U/L    Total Protein 6.2 (L) 6.4 - 8.2 g/dL    AST 11 9 - 39 U/L    Bilirubin, Total 0.4 0.0 - 1.2 mg/dL    ALT 9 (L) 10 - 52 U/L   Phosphorus   Result Value Ref Range    Phosphorus 3.8 2.5 - 4.9 mg/dL         Assessment/Plan   Principal Problem:    MDD (major depressive disorder), recurrent severe, without psychosis (Multi)     Patient is from [facility]. from [facility] where he was stabilized after overdose attempt in which he drank alcohol and ingested unknown amount of Benadryl and Ativan in an attempt to end his life.  Patient stressors include recent job losses, having moved back to [place], struggles with alcohol use disorder.  Patient does express interest in possibly entering residential [facility] treatment once discharged in the hospital.  Is agreeable to start Cymbalta and naltrexone for treatment of alcohol cravings.    Impression:     Labs and Chart: reviewed   Case discussed with treatment team members  Encouraged patient to attend group and other mileu activity  Collateral from family - pending  Discharge planing  Medication:       -Cymbalta 30 mg oral daily at bedtime       -Naltrexone 50 mg oral daily    Medication Consent  Medication Consent: risks, benefits, side effects reviewed for all ordered meds and patient expressed understanding and consent obtained    DIONICIO Green-CNP

## 2024-07-04 LAB
CHOLEST SERPL-MCNC: 163 MG/DL (ref 0–199)
CHOLESTEROL/HDL RATIO: 5.5
EST. AVERAGE GLUCOSE BLD GHB EST-MCNC: 85 MG/DL
HBA1C MFR BLD: 4.6 %
HDLC SERPL-MCNC: 29.6 MG/DL
LDLC SERPL CALC-MCNC: 105 MG/DL
NON HDL CHOLESTEROL: 133 MG/DL (ref 0–149)
POTASSIUM SERPL-SCNC: 3.9 MMOL/L (ref 3.5–5.3)
TRIGL SERPL-MCNC: 141 MG/DL (ref 0–149)
TSH SERPL-ACNC: 0.9 MIU/L (ref 0.44–3.98)
VLDL: 28 MG/DL (ref 0–40)

## 2024-07-04 PROCEDURE — 93010 ELECTROCARDIOGRAM REPORT: CPT | Performed by: INTERNAL MEDICINE

## 2024-07-04 PROCEDURE — 99221 1ST HOSP IP/OBS SF/LOW 40: CPT | Performed by: REGISTERED NURSE

## 2024-07-04 PROCEDURE — 99232 SBSQ HOSP IP/OBS MODERATE 35: CPT | Performed by: PSYCHIATRY & NEUROLOGY

## 2024-07-04 PROCEDURE — 2500000001 HC RX 250 WO HCPCS SELF ADMINISTERED DRUGS (ALT 637 FOR MEDICARE OP): Performed by: REGISTERED NURSE

## 2024-07-04 PROCEDURE — 83036 HEMOGLOBIN GLYCOSYLATED A1C: CPT | Mod: ELYLAB | Performed by: REGISTERED NURSE

## 2024-07-04 PROCEDURE — 84132 ASSAY OF SERUM POTASSIUM: CPT | Performed by: REGISTERED NURSE

## 2024-07-04 PROCEDURE — 80061 LIPID PANEL: CPT | Performed by: REGISTERED NURSE

## 2024-07-04 PROCEDURE — 97150 GROUP THERAPEUTIC PROCEDURES: CPT | Mod: GO

## 2024-07-04 PROCEDURE — 84443 ASSAY THYROID STIM HORMONE: CPT | Performed by: REGISTERED NURSE

## 2024-07-04 PROCEDURE — 36415 COLL VENOUS BLD VENIPUNCTURE: CPT | Performed by: REGISTERED NURSE

## 2024-07-04 PROCEDURE — 1240000001 HC SEMI-PRIVATE BH ROOM DAILY

## 2024-07-04 PROCEDURE — 2500000001 HC RX 250 WO HCPCS SELF ADMINISTERED DRUGS (ALT 637 FOR MEDICARE OP): Performed by: PSYCHIATRY & NEUROLOGY

## 2024-07-04 RX ORDER — PANTOPRAZOLE SODIUM 40 MG/1
40 TABLET, DELAYED RELEASE ORAL
Status: DISCONTINUED | OUTPATIENT
Start: 2024-07-05 | End: 2024-07-09 | Stop reason: HOSPADM

## 2024-07-04 RX ORDER — MULTIVIT-MIN/IRON FUM/FOLIC AC 7.5 MG-4
1 TABLET ORAL DAILY
Status: DISCONTINUED | OUTPATIENT
Start: 2024-07-04 | End: 2024-07-09 | Stop reason: HOSPADM

## 2024-07-04 RX ORDER — FOLIC ACID 1 MG/1
1 TABLET ORAL DAILY
Status: DISCONTINUED | OUTPATIENT
Start: 2024-07-04 | End: 2024-07-09 | Stop reason: HOSPADM

## 2024-07-04 RX ORDER — LANOLIN ALCOHOL/MO/W.PET/CERES
100 CREAM (GRAM) TOPICAL DAILY
Status: DISCONTINUED | OUTPATIENT
Start: 2024-07-04 | End: 2024-07-09 | Stop reason: HOSPADM

## 2024-07-04 RX ORDER — DULOXETIN HYDROCHLORIDE 30 MG/1
60 CAPSULE, DELAYED RELEASE ORAL NIGHTLY
Status: DISCONTINUED | OUTPATIENT
Start: 2024-07-04 | End: 2024-07-09 | Stop reason: HOSPADM

## 2024-07-04 SDOH — HEALTH STABILITY: MENTAL HEALTH: ARE YOU HAVING THOUGHTS OF KILLING YOURSELF RIGHT NOW?: YES

## 2024-07-04 SDOH — HEALTH STABILITY: MENTAL HEALTH: MENTAL HEALTH CONDITIONS/ SYMPTOMS: DEPRESSION

## 2024-07-04 SDOH — SOCIAL STABILITY: SOCIAL INSECURITY: WITHIN THE LAST YEAR, HAVE YOU BEEN HUMILIATED OR EMOTIONALLY ABUSED IN OTHER WAYS BY YOUR PARTNER OR EX-PARTNER?: NO

## 2024-07-04 SDOH — SOCIAL STABILITY: SOCIAL INSECURITY
WITHIN THE LAST YEAR, HAVE YOU BEEN RAPED OR FORCED TO HAVE ANY KIND OF SEXUAL ACTIVITY BY YOUR PARTNER OR EX-PARTNER?: NO

## 2024-07-04 SDOH — HEALTH STABILITY: MENTAL HEALTH: WISH TO BE DEAD (PAST 1 MONTH): YES

## 2024-07-04 SDOH — SOCIAL STABILITY: SOCIAL INSECURITY: RELIGIOUS/CULTURAL FACTORS: NONE REPORTED

## 2024-07-04 SDOH — HEALTH STABILITY: MENTAL HEALTH: IN THE PAST FEW WEEKS, HAVE YOU FELT THAT YOU OR YOUR FAMILY WOULD BE BETTER OFF IF YOU WERE DEAD?: YES

## 2024-07-04 SDOH — HEALTH STABILITY: MENTAL HEALTH: IN THE PAST WEEK, HAVE YOU BEEN HAVING THOUGHTS ABOUT KILLING YOURSELF?: YES

## 2024-07-04 SDOH — HEALTH STABILITY: MENTAL HEALTH: HARK TOTAL SCORE: 0

## 2024-07-04 SDOH — HEALTH STABILITY: MENTAL HEALTH: REPORTED TYPE OF SUBSTANCE: ALCOHOL

## 2024-07-04 SDOH — HEALTH STABILITY: MENTAL HEALTH: DESCRIBE YOUR THOUGHTS OF KILLING YOURSELF RIGHT NOW:: NO THOUGHTS, JUST FEEL I HAVE NO PURPOSE

## 2024-07-04 SDOH — HEALTH STABILITY: MENTAL HEALTH: SUICIDAL BEHAVIOR (LIFETIME): NO

## 2024-07-04 SDOH — HEALTH STABILITY: MENTAL HEALTH: PREVIOUS MENTAL HEALTH TREATMENT: PSYCHIATRIST;COUNSELING

## 2024-07-04 SDOH — HEALTH STABILITY: MENTAL HEALTH: IN THE PAST FEW WEEKS, HAVE YOU WISHED YOU WERE DEAD?: YES

## 2024-07-04 SDOH — ECONOMIC STABILITY: GENERAL: INCOME/EXPENSE INFORMATION: INCOME MEETS EXPENSES

## 2024-07-04 SDOH — SOCIAL STABILITY: SOCIAL INSECURITY: WITHIN THE LAST YEAR, HAVE YOU BEEN AFRAID OF YOUR PARTNER OR EX-PARTNER?: NO

## 2024-07-04 SDOH — SOCIAL STABILITY: SOCIAL INSECURITY: FEELS SAFE LIVING IN HOME: OTHER (COMMENT)

## 2024-07-04 SDOH — HEALTH STABILITY: MENTAL HEALTH: HAVE YOU EVER TRIED TO KILL YOURSELF?: NO

## 2024-07-04 SDOH — HEALTH STABILITY: MENTAL HEALTH: NON-SPECIFIC ACTIVE SUICIDAL THOUGHTS (PAST 1 MONTH): YES

## 2024-07-04 SDOH — ECONOMIC STABILITY: HOUSING INSECURITY: POTENTIALLY UNSAFE HOUSING CONDITIONS: UNABLE TO ASSESS

## 2024-07-04 SDOH — HEALTH STABILITY: MENTAL HEALTH: BEHAVIOR: ORIENTED

## 2024-07-04 SDOH — ECONOMIC STABILITY: HOUSING INSECURITY: HOME SAFETY: PT DENIES HAVING ACCESS TO ANY FIREARMS

## 2024-07-04 SDOH — HEALTH STABILITY: MENTAL HEALTH: ACTIVE SUICIDAL IDEATION WITH SPECIFIC PLAN AND INTENT (PAST 1 MONTH): YES

## 2024-07-04 SDOH — HEALTH STABILITY: MENTAL HEALTH: FAMILY HISTORY SUBSTANCE USE: OTHER (COMMENT)

## 2024-07-04 SDOH — HEALTH STABILITY: MENTAL HEALTH

## 2024-07-04 SDOH — HEALTH STABILITY: MENTAL HEALTH
SUBSTANCE USE: PT REPORTED DRINKING A LITER AND HALF FOR LIQUOR FOR FIVE YEARS. PT ALSO REPORTED THAT HE HAS HAD RECENT BINGE EPISODES THAT HAVE INCREASED AMOUNT OF ETOH USE AND CONCESQUENCES OF HIS DRINKING.

## 2024-07-04 SDOH — HEALTH STABILITY: MENTAL HEALTH: ACTIVE SUICIDAL IDEATION WITH SOME INTENT TO ACT, WITHOUT SPECIFIC PLAN (PAST 1 MONTH): YES

## 2024-07-04 ASSESSMENT — PAIN - FUNCTIONAL ASSESSMENT
PAIN_FUNCTIONAL_ASSESSMENT: 0-10
PAIN_FUNCTIONAL_ASSESSMENT: 0-10

## 2024-07-04 ASSESSMENT — COLUMBIA-SUICIDE SEVERITY RATING SCALE - C-SSRS
2. HAVE YOU ACTUALLY HAD ANY THOUGHTS OF KILLING YOURSELF?: NO
6. HAVE YOU EVER DONE ANYTHING, STARTED TO DO ANYTHING, OR PREPARED TO DO ANYTHING TO END YOUR LIFE?: NO
1. SINCE LAST CONTACT, HAVE YOU WISHED YOU WERE DEAD OR WISHED YOU COULD GO TO SLEEP AND NOT WAKE UP?: NO
2. HAVE YOU ACTUALLY HAD ANY THOUGHTS OF KILLING YOURSELF?: NO
5. HAVE YOU STARTED TO WORK OUT OR WORKED OUT THE DETAILS OF HOW TO KILL YOURSELF? DO YOU INTEND TO CARRY OUT THIS PLAN?: NO
1. SINCE LAST CONTACT, HAVE YOU WISHED YOU WERE DEAD OR WISHED YOU COULD GO TO SLEEP AND NOT WAKE UP?: NO
6. HAVE YOU EVER DONE ANYTHING, STARTED TO DO ANYTHING, OR PREPARED TO DO ANYTHING TO END YOUR LIFE?: NO

## 2024-07-04 ASSESSMENT — PATIENT HEALTH QUESTIONNAIRE - PHQ9
1. LITTLE INTEREST OR PLEASURE IN DOING THINGS: MORE THAN HALF THE DAYS
SUM OF ALL RESPONSES TO PHQ9 QUESTIONS 1 AND 2: 4
2. FEELING DOWN, DEPRESSED OR HOPELESS: MORE THAN HALF THE DAYS

## 2024-07-04 ASSESSMENT — ACTIVITIES OF DAILY LIVING (ADL): BATHING: NO ASSISTANCE

## 2024-07-04 ASSESSMENT — PAIN SCALES - GENERAL
PAINLEVEL_OUTOF10: 0 - NO PAIN
PAINLEVEL_OUTOF10: 0 - NO PAIN

## 2024-07-04 ASSESSMENT — LIFESTYLE VARIABLES: LEVEL_ALCOHOL_USE: ADDICTION/DEPENDENCY

## 2024-07-04 NOTE — PROGRESS NOTES
"Occupational Therapy     REHAB Therapy Assessment & Treatment    Patient Name: Everton Geiger  MRN: [id number]  Today's Date: 7/4/2024      Activity:  \"Untangle Your Thinking\" Group    Attendance:  Attendance  Activity: Discussion/reminisce  Participation: Active participation    Treatment Approach  Approach : Group therapy sessions  Patient Stated Goals: none stated  Cognition: Attention, Directions (Pt alert, oriented, & attentive. Follows complex multi-step directions independently.)  Social Skills: Demonstrates ability to listen to others, Interacts independently in social activity  Emotional: Mood (Pt mood calm, affect animated & appropriate)  Treatment Approach Comments: Pt attended & participated in afternoon therapy group focused on “Untangling Your Thinking” by challenging irrational thoughts. First, pt educated on cognitive distortions including black-and-white thinking, catastrophizing, & jumping to conclusions. Pt participated in discussion about how these distortions can negatively impact daily functioning in areas of occupation including social participation & work. Pt practiced identifying cognitive distortions using example statements, demonstrating the ability to identify irrational statements & errors including emotional reasoning & blaming. Next, pt educated on strategies to challenge irrational thinking patterns, including self-compassion, shades of grey, & fact-checking. Pt practiced changing irrational statements to rational statements by matching a card w/ an example statement to its healthier counterpart. Pt demonstrated good understanding through this activity. Finally, pt practiced “untangling thinking” w/ their own irrational thinking patterns using provided template. Pt challenged his irrational statements using shades of grey & thought substitution. Pt demonstrates good understanding.      Encounter Problems       Encounter Problems (Active)       OT Goals       Pt will explore, identify, " and appropriately utilize effective coping strategies to cope with daily stressors and manage emotions with independence prior to discharge.  (Progressing)       Start:  07/03/24    Expected End:  07/31/24            Pt will demonstrate ability to appropriately modulate emotions and impulses with independence prior to discharge.  (Progressing)       Start:  07/03/24    Expected End:  07/31/24            Pt will explore, identify, and appropriately utilize effective communication strategies to express feelings, wants, and needs with independence prior to discharge.  (Progressing)       Start:  07/03/24    Expected End:  07/31/24            Pt will develop a Relapse Prevention Plan to reflect on possible stressors & plan for healthy coping prior to discharge. (Progressing)       Start:  07/03/24    Expected End:  07/31/24                 Education:  Pt given educational handouts on challenging irrational thinking. Reviewed, discussed, & practiced. Pt demonstrates good understanding.

## 2024-07-04 NOTE — CARE PLAN
day 1 of admission presenting with depression suicide attempt by overdose on alcohol Ativan and Benadryl. Tolerating Cymbalta and naltrexone well.  He denied any alcohol withdrawal signs or symptoms this morning.  He denied any hallucinations.  Patient is tolerating the whitten milieu well.   Patient is attending the groups and meetings and finds them useful in developing more understanding of their symptoms and developing coping skills.    Problem: Ineffective Coping  Goal: LTG - Verbalizes alternatives to suicide  Outcome: Progressing  Goal: STG - Verbalizes control of suicidal thoughts/behaviors  Outcome: Progressing  Goal: Notifies staff when experiencing harmful thoughts toward self/others  Outcome: Progressing  Goal: Verbalizes improved well being  Outcome: Progressing  Goal: Verbalizes ways to manage anxiety  Outcome: Progressing     Problem: Self Care Deficit  Goal: STG - Patient completes hygiene  Outcome: Progressing  Goal: Increase group attendance  Outcome: Progressing  Goal: Accepts need for medications  Outcome: Progressing  Goal: STG - Goes to and eats meals independently in dining room 100% of time  Outcome: Progressing     Problem: Alteration in Sleep  Goal: STG - Reports nightly sleep, duration, and quality  Outcome: Progressing  Goal: STG - Identifies sleep hygiene aids  Outcome: Progressing  Goal: STG - Informs staff if unable to sleep  Outcome: Progressing  Goal: STG - Attends breathing and relaxation group  Outcome: Progressing     Problem: Alteration in Mood  Goal: STG - Desires improved energy level  Outcome: Progressing  Goal: STG - Desires improved mood  Outcome: Progressing     Problem: Altered Thought Processes as Evidenced by  Goal: STG - Desires improvement in ability to think and concentrate  Outcome: Progressing  Goal: STG - Participates in Occupational Therapy and other cognitive assessments  Outcome: Progressing     Problem: Sensory Perceptual Alteration as Evidenced by  Goal:  Participates in unit activities  Outcome: Progressing  Goal: Discusses signs/symptoms of illness/treatment options  Outcome: Progressing  Goal: Initiates reality-based interactions  Outcome: Progressing     Problem: Potential for Harm to Self or Others  Goal: Identifies deescalation techniques  Outcome: Progressing  Goal: Understands least restrictive measures  Outcome: Progressing  Goal: Identifies stressors that lead to harmful behaviors  Outcome: Progressing     Problem: Educational/Scholastic Disruption  Goal: Meets educational requirements during hospitalization  Outcome: Progressing  Goal: Attends class without disruptive behavior  Outcome: Progressing     Problem: Ineffective Coping  Goal: Identifies ineffective coping skills  Outcome: Progressing  Goal: Identifies healthy coping skills  Outcome: Progressing  Goal: Demonstrates healthy coping skills  Outcome: Progressing  Goal: Participates in unit activities  Outcome: Progressing  Goal: Patient/Family participate in treatment and discharge plans  Outcome: Progressing  Goal: Patient/Family verbalizes awareness of resources  Outcome: Progressing  Goal: Understands least restrictive measures  Outcome: Progressing     Problem: Potential for Substance Withdrawal  Goal: Verbalizes signs/symptoms of withdrawal  Outcome: Progressing  Goal: Reports signs/symptoms of withdrawal  Outcome: Progressing  Goal: Free of withdrawal symptoms  Outcome: Progressing     Problem: Anxiety  Goal: Patient/family understands admission protocols  Outcome: Progressing  Goal: Attempts to manage anxiety with help  Outcome: Progressing  Goal: Implements measures to reduce anxiety  Outcome: Progressing     Problem: Defensive Coping  Goal: Cooperates with admission process  Outcome: Progressing  Goal: Identifies reckless/dangerous behavior  Outcome: Progressing  Goal: Identifies stressors that lead to reckless/dangerous behavior  Outcome: Progressing  Goal: Discusses and identifies healthy  coping skills  Outcome: Progressing  Goal: Demonstrates healthy coping skills  Outcome: Progressing  Goal: Identifies appropriate social interaction  Outcome: Progressing  Goal: Demonstrates appropriate social interactions  Outcome: Progressing  Goal: Patient/Family verbalizes awareness of resources  Outcome: Progressing  Goal: Discusses signs/symptoms of illness/treatment options  Outcome: Progressing  Goal: Patient/Family participate in treatment and discharge plans  Outcome: Progressing  Goal: Understands least restrictive measures  Outcome: Progressing  Goal: Free from restraint events  Outcome: Progressing     Problem: Nutrition  Goal: Less than 5 days NPO/clear liquids  Outcome: Progressing  Goal: Oral intake greater than 50%  Outcome: Progressing  Goal: Oral intake greater 75%  Outcome: Progressing  Goal: Consume prescribed supplement  Outcome: Progressing  Goal: Adequate PO fluid intake  Outcome: Progressing  Goal: Nutrition support goals are met within 48 hrs  Outcome: Progressing  Goal: Nutrition support is meeting 75% of nutrient needs  Outcome: Progressing  Goal: Tube feed tolerance  Outcome: Progressing  Goal: BG  mg/dL  Outcome: Progressing  Goal: Lab values WNL  Outcome: Progressing  Goal: Electrolytes WNL  Outcome: Progressing  Goal: Promote healing  Outcome: Progressing  Goal: Maintain stable weight  Outcome: Progressing  Goal: Reduce weight from edema/fluid  Outcome: Progressing  Goal: Gradual weight gain  Outcome: Progressing  Goal: Improve ostomy output  Outcome: Progressing   The patient's goals for the shift include To be present on unit.    The clinical goals for the shift include To be active on therapeutic milieu.    Over the shift, the patient did not make progress toward the following goals. Continues to endorse passive SI. Barriers to progression include poor coping skills. Recommendations to address these barriers include continue medication regime and attending group.

## 2024-07-04 NOTE — CONSULTS
Consults    Reason For Consult  Adult medical examination and optimization for behavioral health unit      History Of Present Illness  Everton Geiger is a 58 y.o. male transferred from [facility] after a wellness check by family.  Patient was found by EMS lying on the floor to his room with multiple empty wine bottles as well as Ativan and Benadryl.  Patient's initial labs showed lactate of 6.6 with blood alcohol level of 203, CT of head and neck were negative, chest x-ray showed patchy left basilar infiltrates and atelectasis with small left pleural effusion.  Patient was IV hydrated, given antibiotics and transferred to ICU with a CIWA score of 21.  Patient was medically stabilized and medically cleared for EPAT evaluation.  Hospitalist team was consulted for adult medical examination optimization for behavioral health unit.    Admission labs to behavioral 5 last showed glucose 87, sodium 139, potassium 3.3, creatinine 0.64, EGFR greater than 90, ALT 9, AST 11, cholesterol 163, , magnesium 182, WBC 6, hemoglobin 13.3 with hematocrit 39.2 which is consistent with previous labs, platelets 256.     Patient examined and seen. Alert and oriented x3, explained to patient assessment, right to , and the right to decline assessment. Patient verbalized understanding and agreed to assessment with TISH Cade as . Patient denies chest pain, shortness of breath, palpitations, abdominal pain, fever or chills.  .  Denies AV hallucinations.  States that he has passive suicidal ideations at this time, feels safe on unit.  RN at bedside during evaluation.  He states he no longer takes hypertension medications as it has been controlled.    Hospitalist to evaluate medical optimization for psychiatric treatment and evaluation.  Neurological evaluation completed.  No acute or chronic medical issues identified at this time that could be contributing to underlying psychiatric symptoms. Pt is medically optimized for  inpatient behavioral health evaluation and treatment.     Review of systems: 10 system were reviewed and were negative except what was mentioned in history of present illness        Past Medical History  He has a past medical history of Alcohol abuse, Anxiety, Delayed emergence from general anesthesia, Depression, and Other specified health status.  Hypertension    Surgical History  He has a past surgical history that includes Septoplasty.     Social History  He reports that he has never smoked. He has never used smokeless tobacco. He reports that he does not currently use alcohol. He reports that he does not use drugs.    Family History  Cardiac history, mental illness, stroke     Allergies  Patient has no known allergies.    Physical Exam  Constitutional: Well developed, awake/alert/oriented x3, no distress, cooperative  Eyes: PERRL, EOMI, clear sclera  ENMT: mucous membranes moist, no apparent injury, no lesions seen  Head/Neck: Neck supple, no apparent injury, thyroid without mass or tenderness  Respiratory/Thorax: Patent airways,  normal breath sounds   Cardiovascular: Regular, rate and rhythm, no murmurs,  normal S 1and S 2  Gastrointestinal: Nondistended, soft, non-tender,    Genitourinary: denies CVA tenderness  or suprapubic tenderness,  voiding freely   Musculoskeletal: ROM intact, no joint swelling,   Extremities: normal extremities,  no contusions or wounds seen   Skin: warm, dry, intact  Neurological: alert/oriented x 3, speech clear, cranial nerves II through XII  grossly intact, minor tremors bilaterally noted to upper extremities  Psychiatric: appropriate mood and behavior  Cranial Nerve Exam: II, III, IV, VI: Visual acuity within normal limits bilaterally, visual fields normal in all quadrants, BRITTNEY, EOMI  Cranial Nerve exam: V: Facial sensations intact bilaterally to dull, sharp, and light touch stimuli  Cranial Nerve exam VII: Facial muscle strength normal/equal bilaterally  Cranial Nerve Exam  VIII: Hearing is normal bilaterally  Cranial Nerve IX,X: Palate and Uvula symmetrical, voice is normal  Cranial Nerve XI: Shoulder shrug strong, equal bilaterally  Cranial Nerve XII: Tongue moves symmetrically  Motor : Good muscle tone, Strength equal upper and lower extremities unless otherwise stated above  Cerebellar: normal gait unless otherwise stated above         Last Recorded Vitals  /83   Pulse 59   Temp 36.8 °C (98.2 °F)   Resp 17   SpO2 99%     Relevant Results  Scheduled medications  Scheduled medications  DULoxetine, 60 mg, oral, Nightly  folic acid, 1 mg, oral, Daily  multivitamin with minerals, 1 tablet, oral, Daily  naltrexone, 50 mg, oral, Daily  [START ON 7/5/2024] pantoprazole, 40 mg, oral, Daily before breakfast  thiamine, 100 mg, oral, Daily      Continuous medications     PRN medications  PRN medications: acetaminophen, alum-mag hydroxide-simeth, hydrOXYzine HCL, ibuprofen, OLANZapine, OLANZapine, traZODone      Continuous medications     PRN medications  PRN medications: acetaminophen, alum-mag hydroxide-simeth, hydrOXYzine HCL, ibuprofen, OLANZapine, OLANZapine, traZODone    Results for orders placed or performed during the hospital encounter of 07/03/24 (from the past 24 hour(s))   Lipid Panel   Result Value Ref Range    Cholesterol 163 0 - 199 mg/dL    HDL-Cholesterol 29.6 mg/dL    Cholesterol/HDL Ratio 5.5     LDL Calculated 105 (H) <=99 mg/dL    VLDL 28 0 - 40 mg/dL    Triglycerides 141 0 - 149 mg/dL    Non HDL Cholesterol 133 0 - 149 mg/dL   Thyroid Stimulating Hormone   Result Value Ref Range    Thyroid Stimulating Hormone 0.90 0.44 - 3.98 mIU/L          Assessment/Plan     # Suicide Attempt via Alcohol and drug overdose - Benzos  Anxiety / Depression   Transfer from Medical  Admit to Behavioral Health Unit  Contract for Safety   Safety Protocols  Medications to be determined by psychiatry   Vital Signs BID  Group Therapy as appropriate  Social Work for outpatient therapy    Encourage Healthy Lifestyle and Exercise as appropriate     # Substance Abuse - Alcohol Severe   Risks discussed  Encourage abstinence  Inpatient/Outpatient treatment therapies   CIWA as per primary team  Continue thiamine, folic acid and protonix while inpatient     # Hypokalemia  Replete as needed  [facility] lab pending     ECG pending due to prolong Qtc on admission       Thank you for consult  Pending ECG and  [facility] lab  Hemodynamically stable    Medicine to follow for above pending items  Call for acute needs    Time spent 46 minutes obtaining labs, imaging, recommendations, interview, assessment, examination, medication review/ordering, and EMR review.    Plan of care was discussed extensively with patient, RN and Primary Team Patient verbalized understanding through teach back method. All questions and concerns addressed upon examination.     Of note, this documentation is completed using the Dragon Dictation system (voice recognition software). There may be spelling and/or grammatical errors that were not corrected prior to final submission.    Ofe Toscano, APRN-CNP

## 2024-07-04 NOTE — CARE PLAN
The patient's goals for the shift include try to sleep    The clinical goals for the shift include sleep    Problem: Ineffective Coping  Goal: LTG - Verbalizes alternatives to suicide  Outcome: Progressing  Goal: STG - Verbalizes control of suicidal thoughts/behaviors  Outcome: Progressing  Goal: Notifies staff when experiencing harmful thoughts toward self/others  Outcome: Progressing  Goal: Verbalizes improved well being  Outcome: Progressing  Goal: Verbalizes ways to manage anxiety  Outcome: Progressing     Problem: Self Care Deficit  Goal: STG - Patient completes hygiene  Outcome: Progressing  Goal: Increase group attendance  Outcome: Progressing  Goal: Accepts need for medications  Outcome: Progressing  Goal: STG - Goes to and eats meals independently in dining room 100% of time  Outcome: Progressing     Problem: Alteration in Sleep  Goal: STG - Reports nightly sleep, duration, and quality  Outcome: Progressing  Goal: STG - Identifies sleep hygiene aids  Outcome: Progressing  Goal: STG - Informs staff if unable to sleep  Outcome: Progressing  Goal: STG - Attends breathing and relaxation group  Outcome: Progressing     Problem: Alteration in Mood  Goal: STG - Desires improved energy level  Outcome: Progressing  Goal: STG - Desires improved mood  Outcome: Progressing     Problem: Altered Thought Processes as Evidenced by  Goal: STG - Desires improvement in ability to think and concentrate  Outcome: Progressing  Goal: STG - Participates in Occupational Therapy and other cognitive assessments  Outcome: Progressing     Problem: Sensory Perceptual Alteration as Evidenced by  Goal: Participates in unit activities  Outcome: Progressing  Goal: Discusses signs/symptoms of illness/treatment options  Outcome: Progressing  Goal: Initiates reality-based interactions  Outcome: Progressing     Problem: Potential for Harm to Self or Others  Goal: Identifies deescalation techniques  Outcome: Progressing  Goal: Understands least  restrictive measures  Outcome: Progressing  Goal: Identifies stressors that lead to harmful behaviors  Outcome: Progressing     Problem: Educational/Scholastic Disruption  Goal: Meets educational requirements during hospitalization  Outcome: Progressing  Goal: Attends class without disruptive behavior  Outcome: Progressing     Problem: Ineffective Coping  Goal: Identifies ineffective coping skills  Outcome: Progressing  Goal: Identifies healthy coping skills  Outcome: Progressing  Goal: Demonstrates healthy coping skills  Outcome: Progressing  Goal: Participates in unit activities  Outcome: Progressing  Goal: Patient/Family participate in treatment and discharge plans  Outcome: Progressing  Goal: Patient/Family verbalizes awareness of resources  Outcome: Progressing  Goal: Understands least restrictive measures  Outcome: Progressing     Problem: Potential for Substance Withdrawal  Goal: Verbalizes signs/symptoms of withdrawal  Outcome: Progressing  Goal: Reports signs/symptoms of withdrawal  Outcome: Progressing  Goal: Free of withdrawal symptoms  Outcome: Progressing     Problem: Anxiety  Goal: Patient/family understands admission protocols  Outcome: Progressing  Goal: Attempts to manage anxiety with help  Outcome: Progressing  Goal: Implements measures to reduce anxiety  Outcome: Progressing     Problem: Defensive Coping  Goal: Cooperates with admission process  Outcome: Progressing  Goal: Identifies reckless/dangerous behavior  Outcome: Progressing  Goal: Identifies stressors that lead to reckless/dangerous behavior  Outcome: Progressing  Goal: Discusses and identifies healthy coping skills  Outcome: Progressing  Goal: Demonstrates healthy coping skills  Outcome: Progressing  Goal: Identifies appropriate social interaction  Outcome: Progressing  Goal: Demonstrates appropriate social interactions  Outcome: Progressing  Goal: Patient/Family verbalizes awareness of resources  Outcome: Progressing  Goal: Discusses  "signs/symptoms of illness/treatment options  Outcome: Progressing  Goal: Patient/Family participate in treatment and discharge plans  Outcome: Progressing  Goal: Understands least restrictive measures  Outcome: Progressing  Goal: Free from restraint events  Outcome: Progressing   Pt calm and cooperative, friendly in interactions. Pt denies anxiety and auditory/visual hallucinations. Pt endorses depression a 7-8/10 on a 1-10 scale with 10 being th worst. When asked about suicide, pt states \"well that's why I'm here\". Pt has no plan and can contract for safety on the unit. Pt complaint with medication administration; Trazodone given for sleep. Pt appeared to sleep well overnight and is currently still sleeping; will continue to monitor and support.  "

## 2024-07-04 NOTE — PROGRESS NOTES
Occupational Therapy     REHAB Therapy Assessment & Treatment    Patient Name: Everton Geiger  MRN: [id number]  Today's Date: 7/4/2024      Activity:  Positive Self-Talk Group    Attendance:  Attendance  Activity: Discussion/reminisce  Participation: Active participation    Treatment Approach  Approach : Group therapy sessions  Patient Stated Goals: none stated  Cognition: Attention, Directions (Pt alert, oriented, & attentive. Follows simple multi-step directions independently.)  Social Skills: Demonstrates ability to listen to others, Cooperates with others in group activity  Emotional: Mood (Pt mood calm, affect animated & appropriate)  Treatment Approach Comments: Pt attended & participated in morning therapy group focused on positive self-talk. Pt educated on self-talk & qualities of positive vs. negative self-talk. Pt educated on the importance of positive self-talk using “The Cognitive Triangle” which explains the connection between thoughts, emotions, & behaviors.  Pt participated in discussion of how negative vs. positive self-talk can impact performance in certain areas of occupation, including work & social participation. Then, pt participated in craft-based activity focused on positive self-talk. Pt given a list of positive self-talk statements & asked to select 1-3 they felt they needed the most at this point in their journey. After selecting statements, pt given materials to create a collage to remind them of their selected statements. Pt did not choose specific statements, but instead aimed to create a collage that gave me general positive messaging. Throughout activity, pt shared w/  their reasons for picking certain images & how those represent their positive self-talk statements. Pt demonstrates good understanding.      Encounter Problems       Encounter Problems (Active)       OT Goals       Pt will explore, identify, and appropriately utilize effective coping strategies to cope with  daily stressors and manage emotions with independence prior to discharge.  (Progressing)       Start:  07/03/24    Expected End:  07/31/24            Pt will demonstrate ability to appropriately modulate emotions and impulses with independence prior to discharge.  (Progressing)       Start:  07/03/24    Expected End:  07/31/24            Pt will explore, identify, and appropriately utilize effective communication strategies to express feelings, wants, and needs with independence prior to discharge.  (Progressing)       Start:  07/03/24    Expected End:  07/31/24            Pt will develop a Relapse Prevention Plan to reflect on possible stressors & plan for healthy coping prior to discharge. (Progressing)       Start:  07/03/24    Expected End:  07/31/24                 Education:  Pt given educational handouts on positive self-talk & “The Cognitive Triangle”. Reviewed & discussed. Pt demonstrates good understanding.

## 2024-07-04 NOTE — PROGRESS NOTES
Everton Geiger is a 58 y.o. male on day 1 of admission presenting with depression suicide attempt by overdose on alcohol Ativan Benadryl unknown amount tolerated Cymbalta and naltrexone well.  He denied any alcohol withdrawal signs or symptoms this morning.  He denied any hallucinations.  Patient is tolerating the whitten milieu well.   Patient is attending the groups and meetings and finds them useful in developing more understanding of their symptoms and developing coping skills. Patient is tolerating medications well.   Labs Reviewed.  Vitals Reviewed.   Nursing Notes Reviewed.   No EPS, TD, vitals stable.      MSE: Patient was alert, oriented to time, place, person and situation. Patient appears well groomed and clad in climate appropriate clothes. Patient is cooperative on approach. Recent and remote memory within normal limits. Memory registration and recall within normal limits. Attention and concentration within normal limits. Speech normal in rate, rhythm and volume. Good eye contact. Thought process Linear. Intact associations. Good fund of knowledge. Mood fine and affect constricted. Patient did not endorse any delusions. Patient denied any auditory visual hallucinations. Patient has denied any active suicidal  ideations and is showing future oriented.  Patient has fair insight, fair judgment and good impulse control.     Musculoskeletal: Normal gait, no Parkinsonism, no Dystonia, no Akathisia, no TD. Psychomotor activity within normal limits.     Diagnosis: Major depression, alcohol use disorder    Assessment and Plan:     Continue current treatment adjustment  Cymbalta increased to a dose of 60 mg daily  Need for Inpatient Care: Medication titration, monitoring for side effects mood and impulsivity.  Plan for inpatient psychiatric care: Continue with psychiatric care in [facility].  Continue with current treatment and medication adjustments. Patient is agreeable with continued medication management and  adjustments discussed.         Last Recorded Vitals  /83   Pulse 59   Temp 36.8 °C (98.2 °F)   Resp 17   SpO2 99%      No results found for this or any previous visit (from the past 24 hour(s)).       Current Facility-Administered Medications:     acetaminophen (Tylenol) tablet 650 mg, 650 mg, oral, q6h PRN, ARCADIO Green    alum-mag hydroxide-simeth (Mylanta) 200-200-20 mg/5 mL oral suspension 10 mL, 10 mL, oral, 4x daily PRN, ARCADIO Green    DULoxetine (Cymbalta) DR capsule 60 mg, 60 mg, oral, Nightly, Samson Small MD    hydrOXYzine HCL (Atarax) tablet 50 mg, 50 mg, oral, q6h PRN, ARCADIO Green    ibuprofen tablet 600 mg, 600 mg, oral, q6h PRN, ARCADIO Green    naltrexone (Depade) tablet 50 mg, 50 mg, oral, Daily, ARCADIO Green, 50 mg at 07/04/24 0820    OLANZapine (ZyPREXA) injection 5 mg, 5 mg, intramuscular, q6h PRN, ARCADIO Green    OLANZapine (ZyPREXA) tablet 5 mg, 5 mg, oral, q6h PRN, ARCADIO Green    traZODone (Desyrel) tablet 50 mg, 50 mg, oral, Nightly PRN, ARCADIO Green, 50 mg at 07/03/24 2118       Samson Small MD

## 2024-07-05 ENCOUNTER — APPOINTMENT (OUTPATIENT)
Dept: CARDIOLOGY | Facility: HOSPITAL | Age: 59
End: 2024-07-05
Payer: MEDICAID

## 2024-07-05 LAB
ATRIAL RATE: 72 BPM
P AXIS: 48 DEGREES
P OFFSET: 192 MS
P ONSET: 145 MS
PR INTERVAL: 152 MS
Q ONSET: 221 MS
QRS COUNT: 12 BEATS
QRS DURATION: 76 MS
QT INTERVAL: 430 MS
QTC CALCULATION(BAZETT): 470 MS
QTC FREDERICIA: 457 MS
R AXIS: 74 DEGREES
T AXIS: 87 DEGREES
T OFFSET: 436 MS
VENTRICULAR RATE: 72 BPM

## 2024-07-05 PROCEDURE — 2500000001 HC RX 250 WO HCPCS SELF ADMINISTERED DRUGS (ALT 637 FOR MEDICARE OP): Performed by: REGISTERED NURSE

## 2024-07-05 PROCEDURE — 97150 GROUP THERAPEUTIC PROCEDURES: CPT | Mod: GO

## 2024-07-05 PROCEDURE — 1240000001 HC SEMI-PRIVATE BH ROOM DAILY

## 2024-07-05 PROCEDURE — 99232 SBSQ HOSP IP/OBS MODERATE 35: CPT | Performed by: PSYCHIATRY & NEUROLOGY

## 2024-07-05 PROCEDURE — 93005 ELECTROCARDIOGRAM TRACING: CPT

## 2024-07-05 PROCEDURE — 2500000001 HC RX 250 WO HCPCS SELF ADMINISTERED DRUGS (ALT 637 FOR MEDICARE OP): Performed by: PSYCHIATRY & NEUROLOGY

## 2024-07-05 PROCEDURE — 2500000002 HC RX 250 W HCPCS SELF ADMINISTERED DRUGS (ALT 637 FOR MEDICARE OP, ALT 636 FOR OP/ED): Performed by: PSYCHIATRY & NEUROLOGY

## 2024-07-05 RX ORDER — DOXEPIN HYDROCHLORIDE 25 MG/1
25 CAPSULE ORAL NIGHTLY
Status: DISCONTINUED | OUTPATIENT
Start: 2024-07-05 | End: 2024-07-06

## 2024-07-05 ASSESSMENT — PAIN SCALES - GENERAL
PAINLEVEL_OUTOF10: 0 - NO PAIN

## 2024-07-05 ASSESSMENT — COLUMBIA-SUICIDE SEVERITY RATING SCALE - C-SSRS
2. HAVE YOU ACTUALLY HAD ANY THOUGHTS OF KILLING YOURSELF?: NO
2. HAVE YOU ACTUALLY HAD ANY THOUGHTS OF KILLING YOURSELF?: NO
1. SINCE LAST CONTACT, HAVE YOU WISHED YOU WERE DEAD OR WISHED YOU COULD GO TO SLEEP AND NOT WAKE UP?: NO
1. SINCE LAST CONTACT, HAVE YOU WISHED YOU WERE DEAD OR WISHED YOU COULD GO TO SLEEP AND NOT WAKE UP?: NO
6. HAVE YOU EVER DONE ANYTHING, STARTED TO DO ANYTHING, OR PREPARED TO DO ANYTHING TO END YOUR LIFE?: NO
6. HAVE YOU EVER DONE ANYTHING, STARTED TO DO ANYTHING, OR PREPARED TO DO ANYTHING TO END YOUR LIFE?: NO

## 2024-07-05 ASSESSMENT — PAIN - FUNCTIONAL ASSESSMENT: PAIN_FUNCTIONAL_ASSESSMENT: 0-10

## 2024-07-05 NOTE — CARE PLAN
Problem: Ineffective Coping  Goal: LTG - Verbalizes alternatives to suicide  Outcome: Progressing  Goal: STG - Verbalizes control of suicidal thoughts/behaviors  Outcome: Progressing  Goal: Notifies staff when experiencing harmful thoughts toward self/others  Outcome: Progressing  Goal: Verbalizes improved well being  Outcome: Progressing  Goal: Verbalizes ways to manage anxiety  Outcome: Progressing     Problem: Self Care Deficit  Goal: STG - Patient completes hygiene  Outcome: Progressing  Goal: Increase group attendance  Outcome: Progressing  Goal: Accepts need for medications  Outcome: Progressing  Goal: STG - Goes to and eats meals independently in dining room 100% of time  Outcome: Progressing     Problem: Alteration in Sleep  Goal: STG - Reports nightly sleep, duration, and quality  Outcome: Progressing  Goal: STG - Identifies sleep hygiene aids  Outcome: Progressing  Goal: STG - Informs staff if unable to sleep  Outcome: Progressing  Goal: STG - Attends breathing and relaxation group  Outcome: Progressing     Problem: Alteration in Mood  Goal: STG - Desires improved energy level  Outcome: Progressing  Goal: STG - Desires improved mood  Outcome: Progressing     Problem: Altered Thought Processes as Evidenced by  Goal: STG - Desires improvement in ability to think and concentrate  Outcome: Progressing  Goal: STG - Participates in Occupational Therapy and other cognitive assessments  Outcome: Progressing     Problem: Sensory Perceptual Alteration as Evidenced by  Goal: Participates in unit activities  Outcome: Progressing  Goal: Discusses signs/symptoms of illness/treatment options  Outcome: Progressing  Goal: Initiates reality-based interactions  Outcome: Progressing     Problem: Potential for Harm to Self or Others  Goal: Identifies deescalation techniques  Outcome: Progressing  Goal: Understands least restrictive measures  Outcome: Progressing  Goal: Identifies stressors that lead to harmful  behaviors  Outcome: Progressing     Problem: Educational/Scholastic Disruption  Goal: Meets educational requirements during hospitalization  Outcome: Progressing  Goal: Attends class without disruptive behavior  Outcome: Progressing     Problem: Ineffective Coping  Goal: Identifies ineffective coping skills  Outcome: Progressing  Goal: Identifies healthy coping skills  Outcome: Progressing  Goal: Demonstrates healthy coping skills  Outcome: Progressing  Goal: Participates in unit activities  Outcome: Progressing  Goal: Patient/Family participate in treatment and discharge plans  Outcome: Progressing  Goal: Patient/Family verbalizes awareness of resources  Outcome: Progressing  Goal: Understands least restrictive measures  Outcome: Progressing     Problem: Potential for Substance Withdrawal  Goal: Verbalizes signs/symptoms of withdrawal  Outcome: Progressing  Goal: Reports signs/symptoms of withdrawal  Outcome: Progressing  Goal: Free of withdrawal symptoms  Outcome: Progressing     Problem: Anxiety  Goal: Patient/family understands admission protocols  Outcome: Progressing  Goal: Attempts to manage anxiety with help  Outcome: Progressing  Goal: Implements measures to reduce anxiety  Outcome: Progressing     Problem: Defensive Coping  Goal: Cooperates with admission process  Outcome: Progressing  Goal: Identifies reckless/dangerous behavior  Outcome: Progressing  Goal: Identifies stressors that lead to reckless/dangerous behavior  Outcome: Progressing  Goal: Discusses and identifies healthy coping skills  Outcome: Progressing  Goal: Demonstrates healthy coping skills  Outcome: Progressing  Goal: Identifies appropriate social interaction  Outcome: Progressing  Goal: Demonstrates appropriate social interactions  Outcome: Progressing  Goal: Patient/Family verbalizes awareness of resources  Outcome: Progressing  Goal: Discusses signs/symptoms of illness/treatment options  Outcome: Progressing  Goal: Patient/Family  participate in treatment and discharge plans  Outcome: Progressing  Goal: Understands least restrictive measures  Outcome: Progressing  Goal: Free from restraint events  Outcome: Progressing     Problem: Nutrition  Goal: Less than 5 days NPO/clear liquids  Outcome: Progressing  Goal: Oral intake greater than 50%  Outcome: Progressing  Goal: Oral intake greater 75%  Outcome: Progressing  Goal: Consume prescribed supplement  Outcome: Progressing  Goal: Adequate PO fluid intake  Outcome: Progressing  Goal: Nutrition support goals are met within 48 hrs  Outcome: Progressing  Goal: Nutrition support is meeting 75% of nutrient needs  Outcome: Progressing  Goal: Tube feed tolerance  Outcome: Progressing  Goal: BG  mg/dL  Outcome: Progressing  Goal: Lab values WNL  Outcome: Progressing  Goal: Electrolytes WNL  Outcome: Progressing  Goal: Promote healing  Outcome: Progressing  Goal: Maintain stable weight  Outcome: Progressing  Goal: Reduce weight from edema/fluid  Outcome: Progressing  Goal: Gradual weight gain  Outcome: Progressing  Goal: Improve ostomy output  Outcome: Progressing   The patient's goals for the shift include to get rest    The clinical goals for the shift include rest and medication          Patient is in the day area socializing amongst his peers.  He is calm and cooperative. Medications reviewed and he is agreeable to HS medications. He does endorse anxiety 4/10 prn atarax requested as well as trazadone. He reports that trazadone was effective. He denies SI/HI and AVH.

## 2024-07-05 NOTE — PROGRESS NOTES
Occupational Therapy     REHAB Therapy Assessment & Treatment    Patient Name: Everton Geiger  MRN: [id number]  Today's Date: 7/5/2024      Activity:  Stress Management & Relaxation Group    Attendance:  Attendance  Activity: Relaxation therapy, Discussion/reminisce  Participation: Active participation    Treatment Approach  Approach : Group therapy sessions  Patient Stated Goals: none stated  Cognition: Attention, Directions (Pt alert, oriented, & attentive. Follows directions given in video through verbal & visual cues independently.)  Social Skills: Demonstrates ability to listen to others, Interacts independently in social activity  Emotional: Mood (Pt mood somewhat depressed but improved from yesterday, affect brighter as he interacts w/ peers)  Stress Management/Relaxation Training: Identifies benefits of stress management/relaxation techniques, Identifies difficulty adjusting to illness, hospitalization, or pain  Treatment Approach Comments: Pt attended & participated in afternoon therapy group focused on stress management & relaxation strategies. Pt educated on stress & the importance of stress management. Pt participated in discussion about stress & how poor stress management can impact occupational performance, stating ways he experiences stress. Reviewed 6 components of stress management including balanced daily routine, assertive communication, wellness, time management, challenging irrational thoughts, & relaxation/other positive coping. Asked pt to identify areas they either want to improve upon or already feel successful at. Pt shared he wants to improve upon assertive communication & especially asking for help, discussing how he was raised to believe this was weakness so it is hard for him to do as an adult. Finally, pt participated in 26-minute relaxation video, demonstrating ability to complete various relaxation techniques including self-massage/acupressure, stretching, deep breathing, &  progressive muscle relaxation. Afterward, pt reported enjoying video & feeling calmer. Pt demonstrates good understanding & motivation to apply skills.      Encounter Problems       Encounter Problems (Active)       OT Goals       Pt will explore, identify, and appropriately utilize effective coping strategies to cope with daily stressors and manage emotions with independence prior to discharge.  (Progressing)       Start:  07/03/24    Expected End:  07/31/24            Pt will demonstrate ability to appropriately modulate emotions and impulses with independence prior to discharge.  (Progressing)       Start:  07/03/24    Expected End:  07/31/24            Pt will explore, identify, and appropriately utilize effective communication strategies to express feelings, wants, and needs with independence prior to discharge.  (Progressing)       Start:  07/03/24    Expected End:  07/31/24            Pt will develop a Relapse Prevention Plan to reflect on possible stressors & plan for healthy coping prior to discharge. (Progressing)       Start:  07/03/24    Expected End:  07/31/24                 Education:  Pt given educational handouts on stress management & relaxation strategies. Reviewed, discussed, & practiced. Pt demonstrates good understanding.

## 2024-07-05 NOTE — PROGRESS NOTES
"Occupational Therapy     REHAB Therapy Assessment & Treatment    Patient Name: Everton Geiger  MRN: [id number]  Today's Date: 7/5/2024      Activity:  Self-Esteem Group    Attendance:  Attendance  Activity: Discussion/reminisce  Participation: Active participation    Treatment Approach  Approach : Group therapy sessions  Patient Stated Goals: none stated  Cognition: Attention, Directions (Pt alert, oriented, & attentive. Follows complex multi-step directions independently.)  Social Skills: Demonstrates ability to listen to others, Interacts independently in social activity  Emotional: Mood (Pt mood still somewhat depressed but more energetic than yesterday, affect brighter)  Treatment Approach Comments: Pt attended & participated in morning therapy group focused on self-esteem. Pt educated on concept of self-esteem & participated in discussion of how negative self-esteem can impact occupational functioning. Pt completed self-esteem self-assessment quiz, which asked pt to reflect upon whether they agree w/ a variety of statements related to self-esteem. Next, pt educated on strategies to improve self-esteem, including challenging irrational thoughts, positive self-talk, gratitude exercises, & using personal strengths. Pt participated in practice of 2 strategies to improve self-esteem. First strategy involved identifying & challenging negative core beliefs. Pt able to identify a negative core belief they hold, \"I have no worth\", & to challenge it w/ 3 pieces of evidence, requiring some verbal assistance coming up w/ a 3rd but eventually doing so successfully. Second strategy, focused in positive psychology, involved pt identifying at least 3 personal strengths & activities they can engage in to demonstrate those strengths. Pt selected strengths & activities. Pt shared w/ group that one of their strengths is logic & a way they can demonstrate it is by challenging his emotional reasoning. Pt demonstrates good " understanding.      Encounter Problems       Encounter Problems (Active)       OT Goals       Pt will explore, identify, and appropriately utilize effective coping strategies to cope with daily stressors and manage emotions with independence prior to discharge.  (Progressing)       Start:  07/03/24    Expected End:  07/31/24            Pt will demonstrate ability to appropriately modulate emotions and impulses with independence prior to discharge.  (Progressing)       Start:  07/03/24    Expected End:  07/31/24            Pt will explore, identify, and appropriately utilize effective communication strategies to express feelings, wants, and needs with independence prior to discharge.  (Progressing)       Start:  07/03/24    Expected End:  07/31/24            Pt will develop a Relapse Prevention Plan to reflect on possible stressors & plan for healthy coping prior to discharge. (Progressing)       Start:  07/03/24    Expected End:  07/31/24                 Education:  Pt given educational handouts on self-esteem, core beliefs, & strengths use plan. Reviewed, discussed, & practiced. Pt demonstrates good understanding.
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
## 2024-07-05 NOTE — PROGRESS NOTES
Everton Geiger is a 58 y.o. male on day 2 of admission presenting with MDD, AUD.    Subjective   Patient reports improved mood.  Patient  Unit participating group therapy, socializing with staff and peers.  Patient does report some difficulty with sleep.  Will start doxepin 25 mg oral daily at bedtime tonight.  Patient tolerating Cymbalta and naltrexone with no adverse effects.    Objective     MSE  General: Appropriately groomed and dressed.  Appearance: Appears stated age.  Attitude: Calm, cooperative.  Behavior: Appropriate eye contact.  Motor activity: No agitation or retardation. no EPS.  Normal gait.  Speech: Regular rate, rhythm, volume and tone.  Mood: Depressed  Affect: Appropriate range  Thought process: Organized, linear, goal-directed.  Associations are logical.  Thought content: Does not endorse suicidal or homicidal ideation, no delusions elicited.  Thought perception: Did not endorse auditory or visual hallucinations.  Cognition: Alert, oriented x3.  no deficit in memory or attention.  Insight: Fair.  Judgment: Fair.    Last Recorded Vitals  /86   Pulse 67   Temp 36.4 °C (97.5 °F)   Resp 16   SpO2 98%      Relevant Results  Scheduled medications  doxepin, 25 mg, oral, Nightly  DULoxetine, 60 mg, oral, Nightly  folic acid, 1 mg, oral, Daily  multivitamin with minerals, 1 tablet, oral, Daily  naltrexone, 50 mg, oral, Daily  pantoprazole, 40 mg, oral, Daily before breakfast  thiamine, 100 mg, oral, Daily      Continuous medications     PRN medications  PRN medications: acetaminophen, alum-mag hydroxide-simeth, hydrOXYzine HCL, ibuprofen, OLANZapine, OLANZapine, traZODone    Results for orders placed or performed during the hospital encounter of 07/03/24 (from the past 24 hour(s))   ECG 12 lead   Result Value Ref Range    Ventricular Rate 72 BPM    Atrial Rate 72 BPM    AL Interval 152 ms    QRS Duration 76 ms    QT Interval 430 ms    QTC Calculation(Bazett) 470 ms    P Axis 48 degrees    R Axis  74 degrees    T Axis 87 degrees    QRS Count 12 beats    Q Onset 221 ms    P Onset 145 ms    P Offset 192 ms    T Offset 436 ms    QTC Fredericia 457 ms        Assessment/Plan   Diagnosis:  MDD, severe without psychosis    Impression:     Labs and Chart: reviewed  Case discussed with treatment team members  Encouraged patient to attend group and other mileu activity  Collateral from family - pending  Discharge planing  Medication:          -Cymbalta 60 mg oral daily at bedtime       -Naltrexone 50 mg oral daily       -Doxepin 25 mg oral daily at bedtime    Medication Consent  Medication Consent: risks, benefits, side effects reviewed for all ordered meds and patient expressed understanding and consent obtained    Javier Rivera, APRN-CNP

## 2024-07-06 PROCEDURE — 2500000002 HC RX 250 W HCPCS SELF ADMINISTERED DRUGS (ALT 637 FOR MEDICARE OP, ALT 636 FOR OP/ED): Performed by: PSYCHIATRY & NEUROLOGY

## 2024-07-06 PROCEDURE — 97150 GROUP THERAPEUTIC PROCEDURES: CPT | Mod: GO

## 2024-07-06 PROCEDURE — 1240000001 HC SEMI-PRIVATE BH ROOM DAILY

## 2024-07-06 PROCEDURE — 2500000001 HC RX 250 WO HCPCS SELF ADMINISTERED DRUGS (ALT 637 FOR MEDICARE OP): Performed by: PSYCHIATRY & NEUROLOGY

## 2024-07-06 PROCEDURE — 2500000001 HC RX 250 WO HCPCS SELF ADMINISTERED DRUGS (ALT 637 FOR MEDICARE OP): Performed by: REGISTERED NURSE

## 2024-07-06 PROCEDURE — 97530 THERAPEUTIC ACTIVITIES: CPT | Mod: GO

## 2024-07-06 PROCEDURE — 99232 SBSQ HOSP IP/OBS MODERATE 35: CPT | Performed by: PSYCHIATRY & NEUROLOGY

## 2024-07-06 RX ORDER — DOXEPIN HYDROCHLORIDE 25 MG/1
50 CAPSULE ORAL NIGHTLY
Status: DISCONTINUED | OUTPATIENT
Start: 2024-07-06 | End: 2024-07-09 | Stop reason: HOSPADM

## 2024-07-06 ASSESSMENT — COLUMBIA-SUICIDE SEVERITY RATING SCALE - C-SSRS
2. HAVE YOU ACTUALLY HAD ANY THOUGHTS OF KILLING YOURSELF?: NO
1. SINCE LAST CONTACT, HAVE YOU WISHED YOU WERE DEAD OR WISHED YOU COULD GO TO SLEEP AND NOT WAKE UP?: NO
2. HAVE YOU ACTUALLY HAD ANY THOUGHTS OF KILLING YOURSELF?: NO
6. HAVE YOU EVER DONE ANYTHING, STARTED TO DO ANYTHING, OR PREPARED TO DO ANYTHING TO END YOUR LIFE?: NO
6. HAVE YOU EVER DONE ANYTHING, STARTED TO DO ANYTHING, OR PREPARED TO DO ANYTHING TO END YOUR LIFE?: NO
1. SINCE LAST CONTACT, HAVE YOU WISHED YOU WERE DEAD OR WISHED YOU COULD GO TO SLEEP AND NOT WAKE UP?: NO

## 2024-07-06 ASSESSMENT — PAIN - FUNCTIONAL ASSESSMENT
PAIN_FUNCTIONAL_ASSESSMENT: 0-10
PAIN_FUNCTIONAL_ASSESSMENT: 0-10

## 2024-07-06 ASSESSMENT — PAIN SCALES - GENERAL
PAINLEVEL_OUTOF10: 0 - NO PAIN
PAINLEVEL_OUTOF10: 0 - NO PAIN

## 2024-07-06 NOTE — PROGRESS NOTES
Everton Geiger is a 58 y.o. male on day 3 of admission has been tolerating Cymbalta well.  He described his mood as fair sleep was not as good and doxepin would be adjusted further as he tolerated the medication well.  Psychoeducation active listening counseling provided.  He may be considering rehab places for alcohol use disorder but he is also entertaining the idea of moving away from this area.    Patient is tolerating the whitten milieu well.   Patient is attending the groups and meetings and finds them useful in developing more understanding of their symptoms and developing coping skills. Patient is tolerating medications well.   Labs Reviewed.  Vitals Reviewed.   Nursing Notes Reviewed.   No EPS, TD, vitals stable.      MSE: Patient was alert, oriented to time, place, person and situation. Patient appears well groomed and clad in climate appropriate clothes. Patient is cooperative on approach. Recent and remote memory within normal limits. Memory registration and recall within normal limits. Attention and concentration within normal limits. Speech normal in rate, rhythm and volume. Good eye contact. Thought process Linear. Intact associations. Good fund of knowledge. Mood sad and affect constricted. Patient did not endorse any delusions. Patient denied any auditory visual hallucinations. Patient has fleeting suicidal  ideations and is showing some future oriented.  Patient has fair insight, fair judgment and good impulse control.     Musculoskeletal: Normal gait, no Parkinsonism, no Dystonia, no Akathisia, no TD. Psychomotor activity within normal limits.     Diagnosis: Major depression, alcohol use disorder    Assessment and Plan:     Continue current treatment  Increase doxepin 50 mg at bedtime  May consider giving Vivitrol injection over the next couple of days  Need for Inpatient Care: Medication titration, monitoring for side effects mood and impulsivity.  Plan for inpatient psychiatric care: Continue with  psychiatric care in [facility].  Continue with current treatment and medication adjustments. Patient is agreeable with continued medication management and adjustments discussed.       Last Recorded Vitals  /86   Pulse 67   Temp 36.4 °C (97.5 °F)   Resp 16   SpO2 98%      No results found for this or any previous visit (from the past 24 hour(s)).       Current Facility-Administered Medications:     acetaminophen (Tylenol) tablet 650 mg, 650 mg, oral, q6h PRN, ARCADIO Green    alum-mag hydroxide-simeth (Mylanta) 200-200-20 mg/5 mL oral suspension 10 mL, 10 mL, oral, 4x daily PRN, ARCADIO Green    doxepin (SINEquan) capsule 50 mg, 50 mg, oral, Nightly, Samson Small MD    DULoxetine (Cymbalta) DR capsule 60 mg, 60 mg, oral, Nightly, Samson Small MD, 60 mg at 07/05/24 2047    folic acid (Folvite) tablet 1 mg, 1 mg, oral, Daily, ARCADIO Dela Cruz, 1 mg at 07/05/24 0908    hydrOXYzine HCL (Atarax) tablet 50 mg, 50 mg, oral, q6h PRN, ARCADIO Green, 50 mg at 07/04/24 2015    ibuprofen tablet 600 mg, 600 mg, oral, q6h PRN, ARCADIO Green    multivitamin with minerals 1 tablet, 1 tablet, oral, Daily, ARCADIO Dela Cruz, 1 tablet at 07/05/24 0908    naltrexone (Depade) tablet 50 mg, 50 mg, oral, Daily, ARCADIO Green, 50 mg at 07/05/24 0909    OLANZapine (ZyPREXA) injection 5 mg, 5 mg, intramuscular, q6h PRN, ARCADIO Green    OLANZapine (ZyPREXA) tablet 5 mg, 5 mg, oral, q6h PRN, ARCADIO Green    pantoprazole (ProtoNix) EC tablet 40 mg, 40 mg, oral, Daily before breakfast, ARCADIO Dela Cruz, 40 mg at 07/05/24 0908    thiamine (Vitamin B-1) tablet 100 mg, 100 mg, oral, Daily, DIONICIO Dela Cruz-CNP, 100 mg at 07/05/24 0909    traZODone (Desyrel) tablet 50 mg, 50 mg, oral, Nightly PRN, DIONICIO Green-CNP, 50 mg at 07/04/24 2015       Samson Small MD

## 2024-07-06 NOTE — PROGRESS NOTES
Occupational Therapy     REHAB Therapy 1: 1 Treatment    Patient Name: Everton Geiger  MRN: [id number]  Today's Date: 7/6/2024  Time in 11:53  Time out : 12:07  Time 14 min  Activity Assessment:   Pt demonstrated good focus/comprehension of positive coping strategies involving beginning education in Mindfulness -to prompt life role management of embracing change to promote wellness/stability in life style.   OT Interventions group/1:1 : Therapeutic use of self/activities, OT Task Skills Group, OT Life Skills Management Skills, Grief Management/Anger Management ,  ADL/I ADL  , Positive Coping Skills/Stress Management, Community Re-Entry, Relaxation, Self Esteem, Communication Skills, Time Management Skills, Addiction education/prevention     Attendance:  Attendance  Activity:  (positve affirmations worksheet)  Participation: Active participation    Therapeutic Recreation:  Treatment Approach  Approach : 30 to 45 minutes  Patient Stated Goals: none stated  Cognition: Attention, Directions  Social Skills: Cooperates with others in group activity (interacts with peers)  Emotional: Mood  Stress Management/Relaxation Training: Identifies benefits of stress management/relaxation techniques  Treatment Approach Comments: Pt demontrated fair plus intiation with activity sheet completion having left  approximately 2.5 -3% blank due to inability to answer.Pt demonstrated  willingness to share answers to positive affirmations to promote continued growth in self esteem. Pt demonstrated good self disclosure without demonstrating anxiety or suicidal ideation, Pt's eye contact was good, but pt's affect was blunted when disclosing answers, but mildly improved affect when interacting with peers.      Encounter Problems       Encounter Problems (Active)       OT Goals       Pt will explore, identify, and appropriately utilize effective coping strategies to cope with daily stressors and manage emotions with independence prior to  discharge.  (Progressing)       Start:  07/03/24    Expected End:  07/31/24            Pt will demonstrate ability to appropriately modulate emotions and impulses with independence prior to discharge.  (Progressing)       Start:  07/03/24    Expected End:  07/31/24            Pt will explore, identify, and appropriately utilize effective communication strategies to express feelings, wants, and needs with independence prior to discharge.  (Progressing)       Start:  07/03/24    Expected End:  07/31/24            Pt will develop a Relapse Prevention Plan to reflect on possible stressors & plan for healthy coping prior to discharge. (Progressing)       Start:  07/03/24    Expected End:  07/31/24                     Education Documentation  Handouts, taught by Noris Hooper OT at 7/6/2024  5:35 PM.  Learner: Patient  Readiness: Acceptance  Method: Demonstration  Response: Demonstrated Understanding, Needs Reinforcement    Precautions, taught by Noris Hooper OT at 7/6/2024  5:35 PM.  Learner: Patient  Readiness: Acceptance  Method: Demonstration  Response: Demonstrated Understanding, Needs Reinforcement    Handouts, taught by Noris Hooper OT at 7/6/2024  3:34 PM.  Learner: Patient  Readiness: Acceptance  Method: Demonstration  Response: Demonstrated Understanding, Needs Reinforcement    Precautions, taught by Noris Hooper OT at 7/6/2024  3:34 PM.  Learner: Patient  Readiness: Acceptance  Method: Demonstration  Response: Demonstrated Understanding, Needs Reinforcement    Education Comments  No comments found.          Additional Comments:

## 2024-07-06 NOTE — CARE PLAN
Problem: Ineffective Coping  Goal: LTG - Verbalizes alternatives to suicide  Outcome: Progressing  Goal: STG - Verbalizes control of suicidal thoughts/behaviors  Outcome: Progressing  Goal: Notifies staff when experiencing harmful thoughts toward self/others  Outcome: Progressing  Goal: Verbalizes improved well being  Outcome: Progressing  Goal: Verbalizes ways to manage anxiety  Outcome: Progressing   The patient's goals for the shift include sleep    The clinical goals for the shift include sleep    Over the shift, the patient did not make progress toward the following goals. Barriers to progression include clinical presentation. Recommendations to address these barriers include encourage medication compliance.    Pt out and social on unit. Pt observed watching t.v. most of evening. Pt ate snack and took HS medications. Pt reports anxiety and depression are low. Pt denies suicidal ideations. Pt rested in bed all night.

## 2024-07-06 NOTE — PROGRESS NOTES
Occupational Therapy     REHAB Therapy Group Treatment A.M.    Patient Name: Everton Geiger  MRN: [id number]  Today's Date: 7/6/2024  Time in 0947  Time out 1047    Activity Assessment:   Pt demonstrated good group focus/comprehension and motivation to attend OT group. Pt social with peers.Pt demonstrated good initiation with minimal verbal prompt. No suicidal ideation/altered reality was expressed      OT Interventions group/1:1 : Therapeutic use of self/activities, OT Task Skills Group, OT Life Skills Management Skills, Grief Management/Anger Management ,  ADL/I ADL  , Positive Coping Skills/Stress Management, Community Re-Entry, Relaxation, Self Esteem, Communication Skills, Time Management Skills, Addiction education/prevention               Attendance:  Attendance  Activity:  (positive coping skills group)  Participation: Active participation    Therapeutic Recreation:  Treatment Approach  Approach :  (60 min OT group)  Patient Stated Goals: none stated  Cognition: Attention, Directions  Social Skills: Cooperates with others in group activity  Emotional: Mood  Stress Management/Relaxation Training: Identifies benefits of stress management/relaxation techniques  Treatment Approach Comments: Pt seen this date for positive coping skills as related to resolving barriers as related to taking medication, 11 skills promoting positive coping skills vs anxiety provoking thoughts ( handout provided). Pt demonstrated good comprehension of all education with initiation throughout OT group intervention with minimal prompting .Eye contact was fair plus. No verbalizations of suicidal ideation were expressed.No altered reality was noted this date.      Encounter Problems       Encounter Problems (Active)       OT Goals       Pt will explore, identify, and appropriately utilize effective coping strategies to cope with daily stressors and manage emotions with independence prior to discharge.  (Progressing)       Start:  07/03/24     Expected End:  07/31/24            Pt will demonstrate ability to appropriately modulate emotions and impulses with independence prior to discharge.  (Progressing)       Start:  07/03/24    Expected End:  07/31/24            Pt will explore, identify, and appropriately utilize effective communication strategies to express feelings, wants, and needs with independence prior to discharge.  (Progressing)       Start:  07/03/24    Expected End:  07/31/24            Pt will develop a Relapse Prevention Plan to reflect on possible stressors & plan for healthy coping prior to discharge. (Progressing)       Start:  07/03/24    Expected End:  07/31/24                     Education Documentation  Handouts, taught by Noris Hooper OT at 7/6/2024  3:34 PM.  Learner: Patient  Readiness: Acceptance  Method: Demonstration  Response: Demonstrated Understanding, Needs Reinforcement    Precautions, taught by Noris Hooper OT at 7/6/2024  3:34 PM.  Learner: Patient  Readiness: Acceptance  Method: Demonstration  Response: Demonstrated Understanding, Needs Reinforcement    Education Comments  No comments found.          Additional Comments:

## 2024-07-06 NOTE — PROGRESS NOTES
Occupational Therapy     REHAB Therapy Group Treatment (p.m.)    Patient Name: Everton Geiger  MRN: [id number]  Today's Date: 7/6/2024  Time in 12;47  Time out 1332  45 min    Activity Assessment:   Pt demonstrated mild difficulty with problem solving positive affirmations as related to his personality. Pt continued to complete activity even when unable to think of answers and initiated filling in sheet with suggestions from peers. No suicidal ideation or altered reality was expressed this date.     OT Interventions group/1:1 : Therapeutic use of self/activities, OT Task Skills Group, OT Life Skills Management Skills, Grief Management/Anger Management ,  ADL/I ADL  , Positive Coping Skills/Stress Management, Community Re-Entry, Relaxation, Self Esteem, Communication Skills, Time Management Skills, Addiction education/prevention   Attendance:  Attendance  Activity:  (positve affirmations worksheet)  Participation: Active participation    Therapeutic Recreation:  Treatment Approach  Approach : 30 to 45 minutes  Patient Stated Goals: none stated  Cognition: Attention, Directions  Social Skills: Cooperates with others in group activity (interacts with peers)  Emotional: Mood  Stress Management/Relaxation Training: Identifies benefits of stress management/relaxation techniques  Treatment Approach Comments: Pt demontrated fair plus intiation with activity sheet completion having left  approximately 2.5 -3% blank due to inability to answer.Pt demonstrated  willingness to share answers to positive affirmations to promote continued growth in self esteem. Pt demonstrated good self disclosure without demonstrating anxiety or suicidal ideation, Pt's eye contact was good, but pt's affect was blunted when disclosing answers, but mildly improved affect when interacting with peers.      Encounter Problems       Encounter Problems (Active)       OT Goals       Pt will explore, identify, and appropriately utilize effective coping  strategies to cope with daily stressors and manage emotions with independence prior to discharge.  (Progressing)       Start:  07/03/24    Expected End:  07/31/24            Pt will demonstrate ability to appropriately modulate emotions and impulses with independence prior to discharge.  (Progressing)       Start:  07/03/24    Expected End:  07/31/24            Pt will explore, identify, and appropriately utilize effective communication strategies to express feelings, wants, and needs with independence prior to discharge.  (Progressing)       Start:  07/03/24    Expected End:  07/31/24            Pt will develop a Relapse Prevention Plan to reflect on possible stressors & plan for healthy coping prior to discharge. (Progressing)       Start:  07/03/24    Expected End:  07/31/24                     Education Documentation  Handouts, taught by Noris Hooper OT at 7/6/2024  3:34 PM.  Learner: Patient  Readiness: Acceptance  Method: Demonstration  Response: Demonstrated Understanding, Needs Reinforcement    Precautions, taught by Noris Hooper OT at 7/6/2024  3:34 PM.  Learner: Patient  Readiness: Acceptance  Method: Demonstration  Response: Demonstrated Understanding, Needs Reinforcement    Education Comments  No comments found.          Additional Comments:

## 2024-07-07 VITALS
SYSTOLIC BLOOD PRESSURE: 111 MMHG | TEMPERATURE: 97.7 F | HEART RATE: 82 BPM | OXYGEN SATURATION: 99 % | DIASTOLIC BLOOD PRESSURE: 62 MMHG | RESPIRATION RATE: 17 BRPM

## 2024-07-07 PROCEDURE — 2500000001 HC RX 250 WO HCPCS SELF ADMINISTERED DRUGS (ALT 637 FOR MEDICARE OP): Performed by: PSYCHIATRY & NEUROLOGY

## 2024-07-07 PROCEDURE — 2500000004 HC RX 250 GENERAL PHARMACY W/ HCPCS (ALT 636 FOR OP/ED): Mod: JZ | Performed by: PSYCHIATRY & NEUROLOGY

## 2024-07-07 PROCEDURE — 1240000001 HC SEMI-PRIVATE BH ROOM DAILY

## 2024-07-07 PROCEDURE — 99232 SBSQ HOSP IP/OBS MODERATE 35: CPT | Performed by: PSYCHIATRY & NEUROLOGY

## 2024-07-07 PROCEDURE — 2500000001 HC RX 250 WO HCPCS SELF ADMINISTERED DRUGS (ALT 637 FOR MEDICARE OP): Performed by: REGISTERED NURSE

## 2024-07-07 PROCEDURE — 97530 THERAPEUTIC ACTIVITIES: CPT | Mod: GO

## 2024-07-07 PROCEDURE — 2500000002 HC RX 250 W HCPCS SELF ADMINISTERED DRUGS (ALT 637 FOR MEDICARE OP, ALT 636 FOR OP/ED): Performed by: PSYCHIATRY & NEUROLOGY

## 2024-07-07 PROCEDURE — 97150 GROUP THERAPEUTIC PROCEDURES: CPT | Mod: GO

## 2024-07-07 ASSESSMENT — PAIN SCALES - GENERAL
PAINLEVEL_OUTOF10: 0 - NO PAIN
PAINLEVEL_OUTOF10: 0 - NO PAIN

## 2024-07-07 ASSESSMENT — PAIN - FUNCTIONAL ASSESSMENT
PAIN_FUNCTIONAL_ASSESSMENT: 0-10
PAIN_FUNCTIONAL_ASSESSMENT: 0-10

## 2024-07-07 ASSESSMENT — COLUMBIA-SUICIDE SEVERITY RATING SCALE - C-SSRS
1. SINCE LAST CONTACT, HAVE YOU WISHED YOU WERE DEAD OR WISHED YOU COULD GO TO SLEEP AND NOT WAKE UP?: NO
6. HAVE YOU EVER DONE ANYTHING, STARTED TO DO ANYTHING, OR PREPARED TO DO ANYTHING TO END YOUR LIFE?: NO
2. HAVE YOU ACTUALLY HAD ANY THOUGHTS OF KILLING YOURSELF?: NO
2. HAVE YOU ACTUALLY HAD ANY THOUGHTS OF KILLING YOURSELF?: NO
6. HAVE YOU EVER DONE ANYTHING, STARTED TO DO ANYTHING, OR PREPARED TO DO ANYTHING TO END YOUR LIFE?: NO
1. SINCE LAST CONTACT, HAVE YOU WISHED YOU WERE DEAD OR WISHED YOU COULD GO TO SLEEP AND NOT WAKE UP?: YES
5. HAVE YOU STARTED TO WORK OUT OR WORKED OUT THE DETAILS OF HOW TO KILL YOURSELF? DO YOU INTEND TO CARRY OUT THIS PLAN?: NO

## 2024-07-07 NOTE — CARE PLAN
The patient's goals for the shift include Patient reports goal is to figure out plan for discharge.    The clinical goals for the shift include Patient will be active on therapeutic milieu.    Patient denies SI, HI, A/VH. Patient mood anxious and depressed, affect calm. Patient observed to be out to day room with peers interacting appropriately. Patient to day room for meals with peers. Patient attended occupational group therapy. Patient compliant with scheduled medications. Patient given Vivitrol injection per orders, tolerated well.       Problem: Ineffective Coping  Goal: LTG - Verbalizes alternatives to suicide  Outcome: Progressing  Goal: STG - Verbalizes control of suicidal thoughts/behaviors  Outcome: Progressing  Goal: Notifies staff when experiencing harmful thoughts toward self/others  Outcome: Progressing  Goal: Verbalizes improved well being  Outcome: Progressing  Goal: Verbalizes ways to manage anxiety  Outcome: Progressing     Problem: Self Care Deficit  Goal: STG - Patient completes hygiene  Outcome: Progressing  Goal: Increase group attendance  Outcome: Progressing  Goal: Accepts need for medications  Outcome: Progressing  Goal: STG - Goes to and eats meals independently in dining room 100% of time  Outcome: Progressing     Problem: Alteration in Sleep  Goal: STG - Reports nightly sleep, duration, and quality  Outcome: Progressing  Goal: STG - Identifies sleep hygiene aids  Outcome: Progressing  Goal: STG - Informs staff if unable to sleep  Outcome: Progressing  Goal: STG - Attends breathing and relaxation group  Outcome: Progressing     Problem: Alteration in Mood  Goal: STG - Desires improved energy level  Outcome: Progressing  Goal: STG - Desires improved mood  Outcome: Progressing     Problem: Altered Thought Processes as Evidenced by  Goal: STG - Desires improvement in ability to think and concentrate  Outcome: Progressing  Goal: STG - Participates in Occupational Therapy and other cognitive  assessments  Outcome: Progressing     Problem: Sensory Perceptual Alteration as Evidenced by  Goal: Participates in unit activities  Outcome: Progressing  Goal: Discusses signs/symptoms of illness/treatment options  Outcome: Progressing  Goal: Initiates reality-based interactions  Outcome: Progressing     Problem: Potential for Harm to Self or Others  Goal: Identifies deescalation techniques  Outcome: Progressing  Goal: Understands least restrictive measures  Outcome: Progressing  Goal: Identifies stressors that lead to harmful behaviors  Outcome: Progressing     Problem: Educational/Scholastic Disruption  Goal: Meets educational requirements during hospitalization  Outcome: Progressing  Goal: Attends class without disruptive behavior  Outcome: Progressing     Problem: Ineffective Coping  Goal: Identifies ineffective coping skills  Outcome: Progressing  Goal: Identifies healthy coping skills  Outcome: Progressing  Goal: Demonstrates healthy coping skills  Outcome: Progressing  Goal: Participates in unit activities  Outcome: Progressing  Goal: Patient/Family participate in treatment and discharge plans  Outcome: Progressing  Goal: Patient/Family verbalizes awareness of resources  Outcome: Progressing  Goal: Understands least restrictive measures  Outcome: Progressing     Problem: Potential for Substance Withdrawal  Goal: Verbalizes signs/symptoms of withdrawal  Outcome: Progressing  Goal: Reports signs/symptoms of withdrawal  Outcome: Progressing  Goal: Free of withdrawal symptoms  Outcome: Progressing     Problem: Anxiety  Goal: Patient/family understands admission protocols  Outcome: Progressing  Goal: Attempts to manage anxiety with help  Outcome: Progressing  Goal: Implements measures to reduce anxiety  Outcome: Progressing     Problem: Defensive Coping  Goal: Cooperates with admission process  Outcome: Progressing  Goal: Identifies reckless/dangerous behavior  Outcome: Progressing  Goal: Identifies stressors  that lead to reckless/dangerous behavior  Outcome: Progressing  Goal: Discusses and identifies healthy coping skills  Outcome: Progressing  Goal: Demonstrates healthy coping skills  Outcome: Progressing  Goal: Identifies appropriate social interaction  Outcome: Progressing  Goal: Demonstrates appropriate social interactions  Outcome: Progressing  Goal: Patient/Family verbalizes awareness of resources  Outcome: Progressing  Goal: Discusses signs/symptoms of illness/treatment options  Outcome: Progressing  Goal: Patient/Family participate in treatment and discharge plans  Outcome: Progressing  Goal: Understands least restrictive measures  Outcome: Progressing  Goal: Free from restraint events  Outcome: Progressing     Problem: Nutrition  Goal: Less than 5 days NPO/clear liquids  Outcome: Progressing  Goal: Oral intake greater than 50%  Outcome: Progressing  Goal: Oral intake greater 75%  Outcome: Progressing  Goal: Consume prescribed supplement  Outcome: Progressing  Goal: Adequate PO fluid intake  Outcome: Progressing  Goal: Nutrition support goals are met within 48 hrs  Outcome: Progressing  Goal: Nutrition support is meeting 75% of nutrient needs  Outcome: Progressing  Goal: Tube feed tolerance  Outcome: Progressing  Goal: BG  mg/dL  Outcome: Progressing  Goal: Lab values WNL  Outcome: Progressing  Goal: Electrolytes WNL  Outcome: Progressing  Goal: Promote healing  Outcome: Progressing  Goal: Maintain stable weight  Outcome: Progressing  Goal: Reduce weight from edema/fluid  Outcome: Progressing  Goal: Gradual weight gain  Outcome: Progressing  Goal: Improve ostomy output  Outcome: Progressing

## 2024-07-07 NOTE — CARE PLAN
Problem: Ineffective Coping  Goal: LTG - Verbalizes alternatives to suicide  Outcome: Progressing  Goal: STG - Verbalizes control of suicidal thoughts/behaviors  Outcome: Progressing  Goal: Notifies staff when experiencing harmful thoughts toward self/others  Outcome: Progressing  Goal: Verbalizes improved well being  Outcome: Progressing  Goal: Verbalizes ways to manage anxiety  Outcome: Progressing   The patient's goals for the shift include sleep    The clinical goals for the shift include sleep    Over the shift, the patient did not make progress toward the following goals. Barriers to progression include clinical presentation. Recommendations to address these barriers include encourage medication compliance.    Pt out and social on unit this evening. Pt reported low anxiety level and did not verbalize any suicidal ideations. Pt ate snack and was compliant with HS medications. Pt rested in bed all night.

## 2024-07-07 NOTE — PROGRESS NOTES
Everton Geiger is a 58 y.o. male on day 4 of admission described his mood is better.  Suicidal thoughts are diminishing although not completely gone.  He is tolerating naltrexone the doxepin increased dose of 50 mg and Cymbalta well.  He slept much better last night although no grogginess or dizziness.  Psychoeducation active listening counseling provided and he is leaning more towards an inpatient rehab    Patient is tolerating the whitten milieu well.   Patient is attending the groups and meetings and finds them useful in developing more understanding of their symptoms and developing coping skills. Patient is tolerating medications well.   Labs Reviewed.  Vitals Reviewed.   Nursing Notes Reviewed.   No EPS, TD, vitals stable.      MSE: Patient was alert, oriented to time, place, person and situation. Patient appears well groomed and clad in climate appropriate clothes. Patient is cooperative on approach. Recent and remote memory within normal limits. Memory registration and recall within normal limits. Attention and concentration within normal limits. Speech normal in rate, rhythm and volume. Good eye contact. Thought process Linear. Intact associations. Good fund of knowledge. Mood sad and affect constricted. Patient did not endorse any delusions. Patient denied any auditory visual hallucinations. Patient has fleeting suicidal  ideations and is not future oriented.  Patient has fair insight, fair judgment and good impulse control.     Musculoskeletal: Normal gait, no Parkinsonism, no Dystonia, no Akathisia, no TD. Psychomotor activity within normal limits.     Diagnosis: Major depression    Assessment and Plan:     Continue current treatment  Plan to discharge to inpatient rehab  Need for Inpatient Care: Medication titration, monitoring for side effects mood and impulsivity.  Plan for inpatient psychiatric care: Continue with psychiatric care in [facility].  Continue with current treatment and medication adjustments.  Patient is agreeable with continued medication management and adjustments discussed.       Last Recorded Vitals  /83   Pulse 81   Temp 36.6 °C (97.9 °F) (Temporal)   Resp 16   SpO2 95%      No results found for this or any previous visit (from the past 24 hour(s)).       Current Facility-Administered Medications:     acetaminophen (Tylenol) tablet 650 mg, 650 mg, oral, q6h PRN, ARCADIO Green    alum-mag hydroxide-simeth (Mylanta) 200-200-20 mg/5 mL oral suspension 10 mL, 10 mL, oral, 4x daily PRN, ARCADIO Green    doxepin (SINEquan) capsule 50 mg, 50 mg, oral, Nightly, Samson Small MD, 50 mg at 07/06/24 2215    DULoxetine (Cymbalta) DR capsule 60 mg, 60 mg, oral, Nightly, Samson Small MD, 60 mg at 07/06/24 2215    folic acid (Folvite) tablet 1 mg, 1 mg, oral, Daily, ARCADIO Dela Cruz, 1 mg at 07/07/24 0815    hydrOXYzine HCL (Atarax) tablet 50 mg, 50 mg, oral, q6h PRN, ARCADIO Green, 50 mg at 07/04/24 2015    ibuprofen tablet 600 mg, 600 mg, oral, q6h PRN, ARCADIO Green    multivitamin with minerals 1 tablet, 1 tablet, oral, Daily, ARCADIO Dela Cruz, 1 tablet at 07/07/24 0815    naltrexone ER (Vivitrol) injection 380 mg, 380 mg, intramuscular, Once, Samson Small MD    OLANZapine (ZyPREXA) injection 5 mg, 5 mg, intramuscular, q6h PRN, ARCADIO Green    OLANZapine (ZyPREXA) tablet 5 mg, 5 mg, oral, q6h PRN, ARCADIO Green    pantoprazole (ProtoNix) EC tablet 40 mg, 40 mg, oral, Daily before breakfast, ARCADIO Dela Cruz, 40 mg at 07/07/24 0815    thiamine (Vitamin B-1) tablet 100 mg, 100 mg, oral, Daily, ARCADIO Dela Cruz, 100 mg at 07/07/24 0815    traZODone (Desyrel) tablet 50 mg, 50 mg, oral, Nightly PRN, ARCADIO Green, 50 mg at 07/04/24 2015       Samson Small MD

## 2024-07-07 NOTE — PROGRESS NOTES
Occupational Therapy     REHAB Therapy group Treatment(a.m.)    Patient Name: Everton Geiger  MRN: [id number]  Today's Date: 7/7/2024  Time in 1055  Time out 11:35  Total time 40 min    Activity Assessment:   Pt demonstrated good group focus/I initiation with OT group facilitator and peers . No suicidal ideation was expressed this date. Pt motivated for all instruction this date    OT Interventions group/1:1 : Therapeutic use of self/activities, OT Task Skills Group, OT Life Skills Management Skills, Grief Management/Anger Management ,  ADL/I ADL  , Positive Coping Skills/Stress Management, Community Re-Entry, Relaxation, Self Esteem, Communication Skills, Time Management Skills, Addiction education/prevention        Attendance:  Attendance  Activity:  (Discussion- Cogitive distortions)  Participation: Active participation    Therapeutic Recreation:  Treatment Approach  Approach : 30 to 45 minutes  Patient Stated Goals: none stated  Cognition: Attention, Directions  Social Skills: Cooperates with others in group activity  Emotional: Mood  Stress Management/Relaxation Training: Identifies benefits of stress management/relaxation techniques  Treatment Approach Comments: Pt educated in Cognitive Distortions (Handout provided) demonstrating good group focus/comprehension of 12 irrational thought processes preventing positive coping/problem solving. Pt demonstrated identification/good application of material to personal thoughts/behaviors. Pt was attentive and demonstrated good initation without expression of suicidal ideation.      Encounter Problems       Encounter Problems (Active)       OT Goals       Pt will explore, identify, and appropriately utilize effective coping strategies to cope with daily stressors and manage emotions with independence prior to discharge.  (Progressing)       Start:  07/03/24    Expected End:  07/31/24            Pt will demonstrate ability to appropriately modulate emotions and impulses  with independence prior to discharge.  (Progressing)       Start:  07/03/24    Expected End:  07/31/24            Pt will explore, identify, and appropriately utilize effective communication strategies to express feelings, wants, and needs with independence prior to discharge.  (Progressing)       Start:  07/03/24    Expected End:  07/31/24            Pt will develop a Relapse Prevention Plan to reflect on possible stressors & plan for healthy coping prior to discharge. (Progressing)       Start:  07/03/24    Expected End:  07/31/24                     Education Documentation  Handouts, taught by Noris Hooper OT at 7/7/2024  7:52 PM.  Learner: Patient  Readiness: Acceptance  Method: Demonstration  Response: Demonstrated Understanding, Needs Reinforcement    Precautions, taught by Noris Hooper OT at 7/7/2024  7:52 PM.  Learner: Patient  Readiness: Acceptance  Method: Demonstration  Response: Demonstrated Understanding, Needs Reinforcement    Handouts, taught by Noris Hooper OT at 7/7/2024  7:52 PM.  Learner: Patient  Readiness: Acceptance  Method: Demonstration  Response: Demonstrated Understanding, Needs Reinforcement    Precautions, taught by Noris Hooper OT at 7/7/2024  7:52 PM.  Learner: Patient  Readiness: Acceptance  Method: Demonstration  Response: Demonstrated Understanding, Needs Reinforcement    Education Comments  No comments found.          Additional Comments:

## 2024-07-08 PROCEDURE — 97150 GROUP THERAPEUTIC PROCEDURES: CPT | Mod: GO

## 2024-07-08 PROCEDURE — 2500000001 HC RX 250 WO HCPCS SELF ADMINISTERED DRUGS (ALT 637 FOR MEDICARE OP): Performed by: PSYCHIATRY & NEUROLOGY

## 2024-07-08 PROCEDURE — 99232 SBSQ HOSP IP/OBS MODERATE 35: CPT | Performed by: PSYCHIATRY & NEUROLOGY

## 2024-07-08 PROCEDURE — 1240000001 HC SEMI-PRIVATE BH ROOM DAILY

## 2024-07-08 PROCEDURE — 2500000002 HC RX 250 W HCPCS SELF ADMINISTERED DRUGS (ALT 637 FOR MEDICARE OP, ALT 636 FOR OP/ED): Performed by: PSYCHIATRY & NEUROLOGY

## 2024-07-08 PROCEDURE — 2500000001 HC RX 250 WO HCPCS SELF ADMINISTERED DRUGS (ALT 637 FOR MEDICARE OP): Performed by: REGISTERED NURSE

## 2024-07-08 ASSESSMENT — COLUMBIA-SUICIDE SEVERITY RATING SCALE - C-SSRS
6. HAVE YOU EVER DONE ANYTHING, STARTED TO DO ANYTHING, OR PREPARED TO DO ANYTHING TO END YOUR LIFE?: NO
1. SINCE LAST CONTACT, HAVE YOU WISHED YOU WERE DEAD OR WISHED YOU COULD GO TO SLEEP AND NOT WAKE UP?: NO
2. HAVE YOU ACTUALLY HAD ANY THOUGHTS OF KILLING YOURSELF?: NO
2. HAVE YOU ACTUALLY HAD ANY THOUGHTS OF KILLING YOURSELF?: NO
1. SINCE LAST CONTACT, HAVE YOU WISHED YOU WERE DEAD OR WISHED YOU COULD GO TO SLEEP AND NOT WAKE UP?: NO
6. HAVE YOU EVER DONE ANYTHING, STARTED TO DO ANYTHING, OR PREPARED TO DO ANYTHING TO END YOUR LIFE?: NO

## 2024-07-08 ASSESSMENT — PAIN SCALES - GENERAL
PAINLEVEL_OUTOF10: 0 - NO PAIN
PAINLEVEL_OUTOF10: 0 - NO PAIN

## 2024-07-08 ASSESSMENT — PAIN - FUNCTIONAL ASSESSMENT: PAIN_FUNCTIONAL_ASSESSMENT: 0-10

## 2024-07-08 NOTE — PROGRESS NOTES
"Occupational Therapy     REHAB Therapy Assessment & Treatment    Patient Name: Everton Geiger  MRN: [id number]  Today's Date: 7/8/2024      Activity:  Goal-Setting Group    Attendance:  Attendance  Activity: Discussion/reminisce  Participation: Active participation    Treatment Approach  Approach : Group therapy sessions  Patient Stated Goals: \"live a happy, positive, rewarding life\"  Cognition: Attention, Directions (Pt alert, oriented, & attentive. Follows complex multi-step directions independently.)  Social Skills: Demonstrates ability to listen to others, Interacts independently in social activity (social w/ peers, helpful)  Emotional: Mood (Pt mood calm, euthymic; affect animated & appropriate)  Stress Management/Relaxation Training: Identifies benefits of stress management/relaxation techniques, Identifies difficulty adjusting to illness, hospitalization, or pain  Treatment Approach Comments: Pt attended & participated in afternoon therapy group focused on goal-setting. Pt educated on the importance of setting & reaching goals. Pt educated on characteristics of effective goals, including making goals realistic, relevant, specific, & time-bound. Pt used handouts provided to go through the steps of creating effective goals & plans to reach a long-term goal. First, pt identified broad goals in different areas of occupation, including living environment, working environment, & coping/dealing. Pt shared a goal w/ the group, sharing, \"I want to find a coping mechanism that doesn't involve numbing\". Next, pt narrowed down a few goals & made them more effective by adding specific objectives, time constraints, & reflecting on whether goals are realistic. Finally, pt selected 1 long-term goal, then developed a series of short-term goals toward completion of that long-term goal. Pt’s selected long-term goal was: \"Live a happy, positive, rewarding life\". Pt’s short-term goals in pursuit of this included: \"Work w/ social " "worker to find rehab options\", \"Complete a rehab program at a facility\", \"Find new & supportive friends\", \"Find new job & living situation\", & \"Become more engaged w/ society again\". Pt also educated on strategies to promote healthy habit building, including environmental changes & having accountability partners. Pt demonstrates good understanding & improving motivation.      Encounter Problems       Encounter Problems (Active)       OT Goals       Pt will explore, identify, and appropriately utilize effective coping strategies to cope with daily stressors and manage emotions with independence prior to discharge.  (Progressing)       Start:  07/03/24    Expected End:  07/31/24            Pt will demonstrate ability to appropriately modulate emotions and impulses with independence prior to discharge.  (Progressing)       Start:  07/03/24    Expected End:  07/31/24            Pt will explore, identify, and appropriately utilize effective communication strategies to express feelings, wants, and needs with independence prior to discharge.  (Progressing)       Start:  07/03/24    Expected End:  07/31/24            Pt will develop a Relapse Prevention Plan to reflect on possible stressors & plan for healthy coping prior to discharge. (Met)       Start:  07/03/24    Expected End:  07/31/24    Resolved:  07/08/24              Education:  Pt given educational handouts on goal-setting & building healthy habits. Reviewed, discussed, & practiced. Pt demonstrates good understanding.       "

## 2024-07-08 NOTE — PROGRESS NOTES
Everton Geiger is a 58 y.o. male on day 5 of admission presenting with MDD, AUD.    Subjective   Patient reports improved mood, sleeping better.  Patient received Vivitrol injection yesterday, tolerating injection well.  Patient active on the unit participating in group therapy.  Patient meets the exercise of change regard to substance use disorder.  Currently working to get him to residential [facility] treatment.    Objective     MSE  General: Appropriately groomed and dressed.  Appearance: Appears stated age.  Attitude: Calm, cooperative.  Behavior: Appropriate eye contact.  Motor activity: No agitation or retardation. no EPS.  Normal gait.  Speech: Regular rate, rhythm, volume and tone.  Mood: Less depressed  Affect: Appropriate range  Thought process: Organized, linear, goal-directed.  Associations are logical.  Thought content: Does not endorse suicidal or homicidal ideation, no delusions elicited.  Thought perception: Did not endorse auditory or visual hallucinations.  Cognition: Alert, oriented x3.  no deficit in memory or attention.  Insight: Fair.  Judgment: Fair.    Last Recorded Vitals  BP (!) 143/93   Pulse 89   Temp 36.3 °C (97.3 °F)   Resp 16   SpO2 97%      Relevant Results  Scheduled medications  doxepin, 50 mg, oral, Nightly  DULoxetine, 60 mg, oral, Nightly  folic acid, 1 mg, oral, Daily  multivitamin with minerals, 1 tablet, oral, Daily  pantoprazole, 40 mg, oral, Daily before breakfast  thiamine, 100 mg, oral, Daily      Continuous medications     PRN medications  PRN medications: acetaminophen, alum-mag hydroxide-simeth, hydrOXYzine HCL, ibuprofen, OLANZapine, OLANZapine, traZODone    No results found for this or any previous visit (from the past 24 hour(s)).     Assessment/Plan   Diagnosis:  MDD, severe without psychosis    Impression:     Labs and Chart: reviewed  Case discussed with treatment team members  Encouraged patient to attend group and other mileu activity  Collateral from family -  pending  Discharge planing  Medication:       - Reviewed. To continue as ordered    Medication Consent  Medication Consent: no medication changes necessary for review    DIONICIO Green-CNP

## 2024-07-08 NOTE — CARE PLAN
"  Problem: Ineffective Coping  Goal: LTG - Verbalizes alternatives to suicide  Outcome: Progressing  Goal: STG - Verbalizes control of suicidal thoughts/behaviors  Outcome: Progressing  Goal: Notifies staff when experiencing harmful thoughts toward self/others  Outcome: Progressing  Goal: Verbalizes improved well being  Outcome: Progressing  Goal: Verbalizes ways to manage anxiety  Outcome: Progressing   The patient's goals for the shift include sleep    The clinical goals for the shift include sleep    Over the shift, the patient did not make progress toward the following goals. Barriers to progression include clinical presentation. Recommendations to address these barriers include encourage medication compliance.    Pt out and social on unit this evening. Pt continues to endorse some anxiety and depression. Pt mood appears somewhat improved since admission. Pt reports suicidal ideations continue however they are more \"depressed\" than upon admission and pt denies desire to self harm on unit. Pt ate snack and took HS medications.     "

## 2024-07-08 NOTE — CARE PLAN
The patient's goals for the shift include explore treatment options    The clinical goals for the shift include discuss discharge planning, options and treatment available, make phone calls, complete paperwork applications if needed.    Over the shift, the patient did not make progress toward the following goals. Barriers to progression include not actively seeking referrals and making calls, does show interest when options presented to him. . Recommendations to address these barriers include identifying resources available, support Patient.

## 2024-07-08 NOTE — NURSING NOTE
"Discharge Planning- Pt. Father called to unit to provide collateral he felt we should be aware of.  Father concern is that Patient does not have a place to go at discharge.  Father recommends inpatient treatment, and is aware Patient will have to be agreeable to treatment.  Father has allowed Patient to live in home 3 times, his Brother has twice and both are not current options.  Father provided history, in summary per Father Patient started drinking in college, drinking became worse in Law School.  In 2001 Pt. Went to [employer], that firm was failing and Patient lost his job.  He did not have a [place] degree so could not stay.  Pt. 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 2016/2017 returned to work in retail in [place], stopped going to work, felt like he was dying, Dad picked him up in [place] and brought home.  Pt. The had job with [employer].   called Dad, Patient no show for 3 weeks.  Dad found in early Dts, spent 5 days in hospital, C3 Jian took back, and lost job again.  Worked at another [employer], lost that job.  Went to [place], lost that job. Pt. Younger brother is a \"successful \" and helped Pt. Get a job (there) .  Brother had taken in twice.  They planned a trip, out of house, Pt. Drank and attempted suicide.  Brother does not want to talk to him, Pt. Does not want to talk to Brother.  Father reports Pt. Is out of options and afraid he will kill self if he does not go to treatment.  Father does not have confidence Pt. Can manage alcohol if out.  Father would only allow Patient to stay a few days if he has a wait time to get into treatment.  Brother has firm boundaries, Brother Wife \"can be a softy\".  Father is Kal, Wife is Magali, they live in [facility] [place], [phone]. Length of call from " [phone]

## 2024-07-08 NOTE — PROGRESS NOTES
Occupational Therapy     REHAB Therapy 1:1 Treatment    Patient Name: Everton Geiger  MRN: [id number]  Today's Date: 7/7/2024  Time in 1356  Time out 1419  Total 23 min    Activity Assessment:   Pt demonstrated good comprehension of all instruction and insight into needing to prevent temptation of alcohol consumption. Pt demonstrated good self disclosure of family conflicts and demonstration of positive coping strategies through OT education this date.    OT Interventions group/1:1 : Therapeutic use of self/activities, OT Task Skills Group, OT Life Skills Management Skills, Grief Management/Anger Management ,  ADL/I ADL  , Positive Coping Skills/Stress Management, Community Re-Entry, Relaxation, Self Esteem, Communication Skills, Time Management Skills, Addiction education/prevention        Attendance:  Attendance  Activity: Discussion/reminisce (activity - negative thought challenge/ How alcohol can damage your body)  Participation: Active participation    Therapeutic Recreation:  Treatment Approach  Approach : 15 to 25 minutes  Patient Stated Goals: none stated  Cognition: Attention, Directions  Social Skills: Cooperates with others in group activity  Emotional: Mood, Behaviors  Stress Management/Relaxation Training: Identifies benefits of stress management/relaxation techniques  Treatment Approach Comments: Pt demonstrated good self disclosure with challenging negative thoughts with exploration of irrational/irrational beliefs and then applying positive coping skills to negative thought -demonstrating improved resolution of stressor.Pt demonstrated good self disclosure throughout.Pt demonstrated good comprehension of having multiple worksheets How Alcohol Damages Your Body posted at various areas home/car to prevent temptation/ relapse. Pt demonstrated good comprehension of all instruction.      Encounter Problems       Encounter Problems (Active)       OT Goals       Pt will explore, identify, and appropriately  utilize effective coping strategies to cope with daily stressors and manage emotions with independence prior to discharge.  (Progressing)       Start:  07/03/24    Expected End:  07/31/24            Pt will demonstrate ability to appropriately modulate emotions and impulses with independence prior to discharge.  (Progressing)       Start:  07/03/24    Expected End:  07/31/24            Pt will explore, identify, and appropriately utilize effective communication strategies to express feelings, wants, and needs with independence prior to discharge.  (Progressing)       Start:  07/03/24    Expected End:  07/31/24            Pt will develop a Relapse Prevention Plan to reflect on possible stressors & plan for healthy coping prior to discharge. (Progressing)       Start:  07/03/24    Expected End:  07/31/24                     Education Documentation  Handouts, taught by Noris Hooper OT at 7/7/2024 10:03 PM.  Learner: Patient  Readiness: Acceptance  Method: Demonstration  Response: Demonstrated Understanding, Needs Reinforcement    Precautions, taught by Noris Hooper OT at 7/7/2024 10:03 PM.  Learner: Patient  Readiness: Acceptance  Method: Demonstration  Response: Demonstrated Understanding, Needs Reinforcement    Handouts, taught by Noris Hooper OT at 7/7/2024  8:23 PM.  Learner: Patient  Readiness: Acceptance  Method: Demonstration  Response: Demonstrated Understanding, Needs Reinforcement    Precautions, taught by Noris Hooper OT at 7/7/2024  8:23 PM.  Learner: Patient  Readiness: Acceptance  Method: Demonstration  Response: Demonstrated Understanding, Needs Reinforcement    Handouts, taught by Noris Hooper OT at 7/7/2024  7:52 PM.  Learner: Patient  Readiness: Acceptance  Method: Demonstration  Response: Demonstrated Understanding, Needs Reinforcement    Precautions, taught by Noris Hooper OT at 7/7/2024  7:52 PM.  Learner: Patient  Readiness: Acceptance  Method:  Demonstration  Response: Demonstrated Understanding, Needs Reinforcement    Handouts, taught by Noris Hooper OT at 7/7/2024  7:52 PM.  Learner: Patient  Readiness: Acceptance  Method: Demonstration  Response: Demonstrated Understanding, Needs Reinforcement    Precautions, taught by Noris Hooper OT at 7/7/2024  7:52 PM.  Learner: Patient  Readiness: Acceptance  Method: Demonstration  Response: Demonstrated Understanding, Needs Reinforcement    Education Comments  No comments found.          Additional Comments:

## 2024-07-08 NOTE — PROGRESS NOTES
Occupational Therapy     REHAB Therapy Group Treatment (p.m.)    Patient Name: Everton Geiger  MRN: [id number]  Today's Date: 7/7/2024  Time in 12:45  Time out 1330  Total time 45 min    Activity Assessment:   Pt demonstrated good group focus/initiation without suicidal ideation. Pt was social with peers during group discussion    OT Interventions group/1:1 : Therapeutic use of self/activities, OT Task Skills Group, OT Life Skills Management Skills, Grief Management/Anger Management ,  ADL/I ADL  , Positive Coping Skills/Stress Management, Community Re-Entry, Relaxation, Self Esteem, Communication Skills, Time Management Skills, Addiction education/prevention        Attendance:  Attendance  Activity: Discussion/reminisce (Communication styles)  Participation: Active participation    Therapeutic Recreation:  Treatment Approach  Approach : 30 to 45 minutes  Patient Stated Goals: none stated  Cognition: Attention, Directions  Social Skills: Cooperates with others in group activity  Emotional: Mood  Stress Management/Relaxation Training: Identifies benefits of stress management/relaxation techniques  Treatment Approach Comments: Pt educated in 4 communication styles with emphasis of Assertive communication (handouts provided) as use as a positive coping strategy to facilitate mental /emotional wellness in interpersonal relationships between family/work. Pt demosntrated good comprehension of all instruction and active intiation thoughout OT group intervention. No suicidal ideation was expressed. Pt demonstrated good eye contact throughout OT intervention      Encounter Problems       Encounter Problems (Active)       OT Goals       Pt will explore, identify, and appropriately utilize effective coping strategies to cope with daily stressors and manage emotions with independence prior to discharge.  (Progressing)       Start:  07/03/24    Expected End:  07/31/24            Pt will demonstrate ability to appropriately  modulate emotions and impulses with independence prior to discharge.  (Progressing)       Start:  07/03/24    Expected End:  07/31/24            Pt will explore, identify, and appropriately utilize effective communication strategies to express feelings, wants, and needs with independence prior to discharge.  (Progressing)       Start:  07/03/24    Expected End:  07/31/24            Pt will develop a Relapse Prevention Plan to reflect on possible stressors & plan for healthy coping prior to discharge. (Progressing)       Start:  07/03/24    Expected End:  07/31/24                     Education Documentation  Handouts, taught by Noris Hooper OT at 7/7/2024  8:23 PM.  Learner: Patient  Readiness: Acceptance  Method: Demonstration  Response: Demonstrated Understanding, Needs Reinforcement    Precautions, taught by Noris Hooper OT at 7/7/2024  8:23 PM.  Learner: Patient  Readiness: Acceptance  Method: Demonstration  Response: Demonstrated Understanding, Needs Reinforcement    Handouts, taught by Noris Hooper OT at 7/7/2024  7:52 PM.  Learner: Patient  Readiness: Acceptance  Method: Demonstration  Response: Demonstrated Understanding, Needs Reinforcement    Precautions, taught by Noris Hooper OT at 7/7/2024  7:52 PM.  Learner: Patient  Readiness: Acceptance  Method: Demonstration  Response: Demonstrated Understanding, Needs Reinforcement    Handouts, taught by Noris Hooper OT at 7/7/2024  7:52 PM.  Learner: Patient  Readiness: Acceptance  Method: Demonstration  Response: Demonstrated Understanding, Needs Reinforcement    Precautions, taught by Noris Hooper OT at 7/7/2024  7:52 PM.  Learner: Patient  Readiness: Acceptance  Method: Demonstration  Response: Demonstrated Understanding, Needs Reinforcement    Education Comments  No comments found.          Additional Comments:

## 2024-07-08 NOTE — PROGRESS NOTES
"Occupational Therapy     REHAB Therapy Assessment & Treatment    Patient Name: Everton Geiger  MRN: [id number]  Today's Date: 7/8/2024      Activity:  Stages of Change & Relapse Prevention Planning Group    Attendance:  Attendance  Activity: Discussion/reminisce  Participation: Active participation    Treatment Approach  Approach : Group therapy sessions  Patient Stated Goals: none stated  Cognition: Attention, Directions (Pt alert, oriented, & attentive. Follows complex multi-step directions independently.)  Social Skills: Demonstrates ability to listen to others, Interacts independently in social activity, Cooperates with others in group activity (very interactive with & helpful toward peers)  Emotional: Mood (Pt mood calm, more euthymic; affect animated & appropriate)  Stress Management/Relaxation Training: Identifies benefits of stress management/relaxation techniques, Identifies difficulty adjusting to illness, hospitalization, or pain  Treatment Approach Comments: Pt attended & participated in morning therapy group focused on stages of change & relapse prevention planning. First, pt educated on the stages of change as they relate to destructive behaviors including substance abuse & self-harm. Educated pt on what each stage looks like in the context of daily occupations. Pt participated in discussion about change & recovery, identifying themselves to be at \"fence\" stage. Pt shared examples of factors that make change difficult, including comfort found in previously abused substances. Next, pt educated on mental health maintenance after discharge using “The Hand” which emphasizes the importance of counseling, life skills, stress management, medication, & a balanced daily routine in maintaining mental wellness. Finally, pt guided through filling out “Relapse Prevention Plan” handout, which involves reflection on diagnoses & symptoms, identifying potential stressors after discharge, & planning to manage these " "stressors in a healthy way. Through completion of this handout, pt identified diagnosis of severe depression; symptoms of self-medicating w/ alcohol, SI, & lack of motivation; current stressor after discharge is lack of a stable home life; ways he can reduce stress are by \"starting over\" (in discussion, pt referenced the need to have a fresh start after years of instability so he can work toward getting better); personal positive qualities include adaptability & common sense; supporters include friends Benjamin & Meri; actions to prevent relapse include reaching out to a friend & reflecting. Pt demonstrates good understanding & improving motivation to apply skills.      Encounter Problems       Encounter Problems (Active)       OT Goals       Pt will explore, identify, and appropriately utilize effective coping strategies to cope with daily stressors and manage emotions with independence prior to discharge.  (Progressing)       Start:  07/03/24    Expected End:  07/31/24            Pt will demonstrate ability to appropriately modulate emotions and impulses with independence prior to discharge.  (Progressing)       Start:  07/03/24    Expected End:  07/31/24            Pt will explore, identify, and appropriately utilize effective communication strategies to express feelings, wants, and needs with independence prior to discharge.  (Progressing)       Start:  07/03/24    Expected End:  07/31/24            Pt will develop a Relapse Prevention Plan to reflect on possible stressors & plan for healthy coping prior to discharge. (Met)       Start:  07/03/24    Expected End:  07/31/24    Resolved:  07/08/24                Education:  Pt given educational handouts on stages of change & relapse prevention planning. Reviewed, discussed, & practiced. Pt demonstrates good understanding.       "

## 2024-07-08 NOTE — CARE PLAN
Problem: Self Care Deficit  Goal: Increase group attendance  Outcome: Progressing     Problem: Self Care Deficit  Goal: Accepts need for medications  Outcome: Met     Problem: Self Care Deficit  Goal: STG - Goes to and eats meals independently in dining room 100% of time  Outcome: Met     Problem: Alteration in Sleep  Goal: STG - Reports nightly sleep, duration, and quality  Outcome: Progressing     Problem: Defensive Coping  Goal: Patient/Family participate in treatment and discharge plans  Outcome: Progressing

## 2024-07-09 ENCOUNTER — PHARMACY VISIT (OUTPATIENT)
Dept: PHARMACY | Facility: CLINIC | Age: 59
End: 2024-07-09
Payer: COMMERCIAL

## 2024-07-09 VITALS
RESPIRATION RATE: 16 BRPM | TEMPERATURE: 97.7 F | HEART RATE: 71 BPM | SYSTOLIC BLOOD PRESSURE: 131 MMHG | OXYGEN SATURATION: 99 % | DIASTOLIC BLOOD PRESSURE: 94 MMHG

## 2024-07-09 PROCEDURE — 97150 GROUP THERAPEUTIC PROCEDURES: CPT | Mod: GO

## 2024-07-09 PROCEDURE — RXMED WILLOW AMBULATORY MEDICATION CHARGE

## 2024-07-09 PROCEDURE — 2500000001 HC RX 250 WO HCPCS SELF ADMINISTERED DRUGS (ALT 637 FOR MEDICARE OP): Performed by: REGISTERED NURSE

## 2024-07-09 PROCEDURE — 99239 HOSP IP/OBS DSCHRG MGMT >30: CPT | Performed by: PSYCHIATRY & NEUROLOGY

## 2024-07-09 RX ORDER — DOXEPIN HYDROCHLORIDE 50 MG/1
50 CAPSULE ORAL NIGHTLY
Qty: 30 CAPSULE | Refills: 0 | Status: SHIPPED | OUTPATIENT
Start: 2024-07-09 | End: 2024-08-08

## 2024-07-09 RX ORDER — NALTREXONE 380 MG
380 KIT INTRAMUSCULAR
Start: 2024-07-09

## 2024-07-09 RX ORDER — PANTOPRAZOLE SODIUM 40 MG/1
40 TABLET, DELAYED RELEASE ORAL
Qty: 30 TABLET | Refills: 0 | Status: SHIPPED | OUTPATIENT
Start: 2024-07-10 | End: 2024-08-09

## 2024-07-09 RX ORDER — DULOXETIN HYDROCHLORIDE 60 MG/1
60 CAPSULE, DELAYED RELEASE ORAL NIGHTLY
Qty: 30 CAPSULE | Refills: 0 | Status: SHIPPED | OUTPATIENT
Start: 2024-07-09 | End: 2024-08-08

## 2024-07-09 SDOH — SOCIAL STABILITY: SOCIAL INSECURITY: ASSESSMENT OF VIOLENCE: NONE NOTED

## 2024-07-09 SDOH — HEALTH STABILITY: MENTAL HEALTH

## 2024-07-09 SDOH — HEALTH STABILITY: MENTAL HEALTH: BEHAVIOR: ORIENTED

## 2024-07-09 SDOH — HEALTH STABILITY: MENTAL HEALTH: ACTIVE SUICIDAL IDEATION WITH SPECIFIC PLAN AND INTENT (PAST 1 MONTH): YES

## 2024-07-09 SDOH — HEALTH STABILITY: MENTAL HEALTH: MENTAL HEALTH CONDITIONS/ SYMPTOMS: DEPRESSION

## 2024-07-09 SDOH — HEALTH STABILITY: MENTAL HEALTH: ARE YOU HAVING THOUGHTS OF KILLING YOURSELF RIGHT NOW?: NO

## 2024-07-09 SDOH — ECONOMIC STABILITY: GENERAL: FINANCIAL CONCERNS: UNABLE TO PAY BILLS

## 2024-07-09 SDOH — HEALTH STABILITY: MENTAL HEALTH: MAJOR CHANGE/ LOSS/ STRESSOR: CHEMICAL DEPENDENCY/ ABUSE;EMPLOYMENT CONCERNS

## 2024-07-09 SDOH — ECONOMIC STABILITY: GENERAL: INCOME/EXPENSE INFORMATION: EXPENSES EXCEED INCOME

## 2024-07-09 SDOH — SOCIAL STABILITY: SOCIAL INSECURITY: FEELS SAFE LIVING IN HOME: YES

## 2024-07-09 SDOH — HEALTH STABILITY: MENTAL HEALTH: FAMILY HISTORY SUBSTANCE USE: FATHER;MOTHER;BROTHER

## 2024-07-09 SDOH — HEALTH STABILITY: MENTAL HEALTH: WISH TO BE DEAD (PAST 1 MONTH): YES

## 2024-07-09 SDOH — SOCIAL STABILITY: SOCIAL INSECURITY: THOUGHTS OF HARM TO OTHERS: NO

## 2024-07-09 SDOH — HEALTH STABILITY: MENTAL HEALTH: IN THE PAST FEW WEEKS, HAVE YOU FELT THAT YOU OR YOUR FAMILY WOULD BE BETTER OFF IF YOU WERE DEAD?: NO

## 2024-07-09 SDOH — HEALTH STABILITY: MENTAL HEALTH: REPORTED TYPE OF SUBSTANCE: ALCOHOL

## 2024-07-09 SDOH — HEALTH STABILITY: MENTAL HEALTH: IN THE PAST WEEK, HAVE YOU BEEN HAVING THOUGHTS ABOUT KILLING YOURSELF?: NO

## 2024-07-09 SDOH — HEALTH STABILITY: MENTAL HEALTH: HAVE YOU EVER TRIED TO KILL YOURSELF?: YES

## 2024-07-09 SDOH — HEALTH STABILITY: MENTAL HEALTH: IN THE PAST FEW WEEKS, HAVE YOU WISHED YOU WERE DEAD?: YES

## 2024-07-09 SDOH — HEALTH STABILITY: MENTAL HEALTH: SUICIDAL BEHAVIOR (LIFETIME): NO

## 2024-07-09 SDOH — HEALTH STABILITY: MENTAL HEALTH: PATIENT PERSONAL STRENGTHS: EXPRESSIVE OF EMOTIONS;FUTURE/ GOAL ORIENTED;ABLE TO ADAPT

## 2024-07-09 SDOH — ECONOMIC STABILITY: GENERAL: FINANCIAL RESOURCES: MEDICAID

## 2024-07-09 ASSESSMENT — ANXIETY QUESTIONNAIRES
2. NOT BEING ABLE TO STOP OR CONTROL WORRYING: NOT AT ALL
4. TROUBLE RELAXING: NOT AT ALL
1. FEELING NERVOUS, ANXIOUS, OR ON EDGE: NOT AT ALL
6. BECOMING EASILY ANNOYED OR IRRITABLE: NOT AT ALL
GAD7 TOTAL SCORE: 0
3. WORRYING TOO MUCH ABOUT DIFFERENT THINGS: NOT AT ALL
7. FEELING AFRAID AS IF SOMETHING AWFUL MIGHT HAPPEN: NOT AT ALL
5. BEING SO RESTLESS THAT IT IS HARD TO SIT STILL: NOT AT ALL

## 2024-07-09 ASSESSMENT — PAIN SCALES - GENERAL: PAINLEVEL_OUTOF10: 0 - NO PAIN

## 2024-07-09 ASSESSMENT — COLUMBIA-SUICIDE SEVERITY RATING SCALE - C-SSRS
1. SINCE LAST CONTACT, HAVE YOU WISHED YOU WERE DEAD OR WISHED YOU COULD GO TO SLEEP AND NOT WAKE UP?: NO
2. HAVE YOU ACTUALLY HAD ANY THOUGHTS OF KILLING YOURSELF?: NO
5. HAVE YOU STARTED TO WORK OUT OR WORKED OUT THE DETAILS OF HOW TO KILL YOURSELF? DO YOU INTEND TO CARRY OUT THIS PLAN?: NO
6. HAVE YOU EVER DONE ANYTHING, STARTED TO DO ANYTHING, OR PREPARED TO DO ANYTHING TO END YOUR LIFE?: NO

## 2024-07-09 ASSESSMENT — PATIENT HEALTH QUESTIONNAIRE - PHQ9
2. FEELING DOWN, DEPRESSED OR HOPELESS: SEVERAL DAYS
SUM OF ALL RESPONSES TO PHQ9 QUESTIONS 1 AND 2: 2
1. LITTLE INTEREST OR PLEASURE IN DOING THINGS: SEVERAL DAYS

## 2024-07-09 ASSESSMENT — PAIN - FUNCTIONAL ASSESSMENT: PAIN_FUNCTIONAL_ASSESSMENT: 0-10

## 2024-07-09 ASSESSMENT — ACTIVITIES OF DAILY LIVING (ADL): BATHING: NO ASSISTANCE

## 2024-07-09 NOTE — PROGRESS NOTES
Occupational Therapy     REHAB Therapy Assessment & Treatment    Patient Name: Everton Geiger  MRN: [id number]  Today's Date: 7/9/2024      Activity:  Assertive Communication Group    Attendance:  Attendance  Activity: Discussion/reminisce  Participation: Active participation    Treatment Approach  Approach : Group therapy sessions  Patient Stated Goals: none stated  Cognition: Attention, Directions (Pt alert, oriented, & attentive. Follows complex multi-step directions independently.)  Social Skills: Demonstrates ability to listen to others, Interacts independently in social activity (very helpful & supportive toward peers)  Emotional: Mood (Pt mood & affect brighter)  Stress Management/Relaxation Training: Identifies benefits of stress management/relaxation techniques, Identifies difficulty adjusting to illness, hospitalization, or pain  Treatment Approach Comments: Pt attended & participated in morning therapy group focused on assertive communication. First, pt given quiz, “How Do You Handle Your Anger?” & asked to complete. Pt completed & shared results, which identified pt as a primarily passive communicator. Pt participated in discussion about communication styles, including the way various communication styles can impact functioning in meaningful roles & occupations. Pt shared how passive communication can lead to being taken advantage of by others in social situations. Next, pt participated in group practice of identifying communication styles & creating more assertive alternates for example scenarios. Pt demonstrated good understanding through this activity. Then, pt educated on strategies for assertive communication, including “I” statements.  Pt practiced creating an “I” statement related to an interpersonal situation where they would like to be more assertive, using template provided on handout. Pt shared “I” statement w/ OT, which involved communicating to his family that he wants to spend time alone  without them making him feel guilty. Finally, pt participated in group practice of changing example “you” statements to more effective “I” statements, demonstrating good understanding.      Encounter Problems       Encounter Problems (Resolved)       OT Goals       Pt will explore, identify, and appropriately utilize effective coping strategies to cope with daily stressors and manage emotions with independence prior to discharge.  (Met)       Start:  07/03/24    Expected End:  07/31/24    Resolved:  07/09/24         Pt will demonstrate ability to appropriately modulate emotions and impulses with independence prior to discharge.  (Met)       Start:  07/03/24    Expected End:  07/31/24    Resolved:  07/09/24         Pt will explore, identify, and appropriately utilize effective communication strategies to express feelings, wants, and needs with independence prior to discharge.  (Met)       Start:  07/03/24    Expected End:  07/31/24    Resolved:  07/09/24         Pt will develop a Relapse Prevention Plan to reflect on possible stressors & plan for healthy coping prior to discharge. (Met)       Start:  07/03/24    Expected End:  07/31/24    Resolved:  07/08/24              Education:  Pt given educational handouts on communication styles & assertive communication tips. Reviewed, discussed, & practiced. Pt demonstrates good understanding.

## 2024-07-09 NOTE — PROGRESS NOTES
"Pt cooperative with staff requests. Pt appears paranoid, stating that \"There are at least two dozen things in this [court paperwork] that are incorrect.\" He stated that the paperwork said he was not taking his medication, but that in reality, he was not prescribed any. Pt stated that the incorrect statements were going to \"stand out\" and mess with his case. He stated he was not being represented in the case and that things were unfair to him in the trial. Upon returning, pt cooperated with the search. He stated that court \"went okay\", but did not want to elaborate. Pt denies SI/SIB.      08/26/19 1300   Behavioral Health   Thinking paranoid;poor concentration   Orientation person: oriented;place: oriented;date: oriented;time: oriented   Memory confabulation   Insight poor;denial of illness   Judgement impaired   Eye Contact staring;into space   Affect blunted, flat;tense   Mood irritable;hopeless   Physical Appearance/Attire disheveled   Hygiene neglected grooming - unclean body, hair, teeth   Suicidality other (see comments)  (denies)   1. Wish to be Dead No   2. Non-Specific Active Suicidal Thoughts  No   Elopement Distress about legal situation (holds for mental health or criminal)   Activity isolative;withdrawn   Speech flight of ideas   Medication Sensitivity no observed side effects;no stated side effects   Psychomotor / Gait balanced;steady   Psycho Education   Type of Intervention 1:1 intervention   Response participates with cues/redirection   Hours 0.5   Treatment Detail Check in   Activities of Daily Living   Hygiene/Grooming independent   Oral Hygiene independent   Dress independent   Room Organization independent   Activity   Activity Assistance Provided independent     " Social Work Note  Met with pt and reviewed tx options.  Pt does not wish to attend AA but is open to inpt tx  Pt has been in contact with [facility] in [place] and awaiting acceptance.  Reviewed other options with pt in case he is not accepted.  Pt receptive to [facility] in [place].  Pt signed OLMAN and this clinician contacted [facility].  Reviewed pt's status and faxed intake paperwork for review,

## 2024-07-09 NOTE — DISCHARGE INSTR - APPOINTMENTS
[facility]   [address] [place] [address]  WALK-IN HOURS:  Intake & Assessment  Monday - Friday, 8:00 a.m. - 2:00 p.m.  [phone]  W[facility] Telephone  Available 24/7:  [phone]       [address] [address] [phone]    Any questions call west [phone]

## 2024-07-09 NOTE — DISCHARGE SUMMARY
Discharge Diagnosis:  MDD (major depressive disorder), recurrent severe, without psychosis (Multi)    Hospital Course:  Patient is a 58-year-old male with a history of MDD and AUD who was admitted to [address] for suicide attempt. Due to acutely elevated and imminent risk for self-harm/harm to others, patient required a level of care equivalent to inpatient hospitalization for safety, evaluation, treatment and stabilization.     The patient was admitted to [address] under the care of Dr. Small, restricted to the whitten and placed on suicide, behavior and elopement precautions.  At the beginning of hospitalization, patient  presented to the ED at [facility] on 6/26 after being found in his room with altered mental status, patient intoxicated with alcohol with bottles of Ativan and Benadryl around him.  Patient initially treated in the ICU at [facility] for alcohol withdrawal and delirium.  Patient medically cleared and transferred to [facility].  Patient admitted that he drank alcohol and took the pills with an intention to end his life.  Patient reported he has been struggling with depression and problem alcohol use for many years.  Patient stated that he was living in [place] and working as a .  Patient lost his job and was unable to afford living in the area.  He was forced to move back to [facility] where he is originally from.  Patient states that this was a depressing moment for him.  Prior to moving back he spoke to many family members, and he felt that they did not want him here.  He was initially going to stay with his parents, but they told him he could not stay with them once he arrived.  Was staying with his brother and sister-in-law, but also felt that he was not wanted there.  They went on vacation and that is when he drank alcohol and took the overdose.  Patient reports he has been working to find his own place, was scheduled to move into a place in [place], but this  fell through at the last second.  Patient recently got a new job as a , but stated that he has been drinking too much alcohol and has missed the first few days of work.  Patient reported he has been drinking approximately a liter of wine daily.  Patient denies any previous suicide attempts.  States he has experienced suicidal ideation in the past.  Patient previously prescribed Lexapro, Prozac, venlafaxine, Seroquel, and Zoloft.  Patient reports that he felt the venlafaxine was most effective, but stated that it eventually stopped working.         The treatment team made the following interventions: medication, group/milieu therapy, individual therapy    Over the course of hospitalization, patient reported improvement and objective signs of improvement were noted by staff.  Patient ported improved mood and sleep.  Patient active on unit, participating in group therapy and socializing with staff and peers.  Patient in action stage of change in regard to substance use disorder.  Patient was accepted to [facility] for inpatient substance use treatment.  Patient future oriented, looking forward to starting his recovery journey.    Psychiatric Medications: Cymbalta 60 mg oral daily, patient received Vivitrol 380 mg IM on 7/7/2024, doxepin 50 mg oral daily at bedtime.  Patient tolerated medications without side effects.    Prior to the date of discharge, patient was able to contract for safety and stated they felt safe and appropriate for discharge.  The treatment team found the patient not to be an imminent danger to self or others.  The patient denied suicidal or homicidal ideation and did not endorse auditory and visual hallucinations.  The patient's condition at the time of discharge was stable and initial symptoms improved over the course of hospitalization.    The patient was discharged with a 30-day supply of Cymbalta 60 mg oral daily, doxepin 50 mg oral daily at bedtime.    The patient was instructed  to call the patient's outpatient provider in the event of worsening symptoms or medication side effects.  Should the patient be unable to maintain their personal safety or the safety of others, instructions were provided to dial 9-1-1 or go to the closest emergency room.    Discharge Mental Status Exam:  General:  Patient is awake, alert, and oriented to person, place, time, and situation.    Appearance:  Appears well-hydrated, well-nourished, and well-groomed and approximately stated age.   Attitude:  Patient was calm and cooperative throughout the interview, which is appropriate to the context of the interview and the topics discussed.   Behavior:  Eye contact is appropriate with topics of discussion.   Motor Activity:  Motor activity is normal. No psychomotor disturbances or abnormal involuntary movements were noted, including psychomotor agitation, psychomotor retardation, involuntary movements, extrapyramidal symptoms, akathisia, or tardive dyskinesia. Gait is normal.   Speech:  Speech is spontaneous, coherent, fluent and of appropriate quantity, rate, volume, and tone and non-vulgar/vulgar.  Speech and mannerisms are consistent with topics of discussion.   Affect:  Euthymic with a full range, mood congruent and appropriate to content.   Thought Process:  Thought process was linear, organized, and goal-directed and devoid of loose associations, flight of ideas, thought blocking or tangents.   Thought Content:  Thought content was devoid of suicidal ideation or intent, homicidal ideation or intent, self-harm ideation or intent, delusions, illusions, obsessions, or paranoia.   Thought Perception:  Did not endorse auditory or visual hallucinations. Patient did not appear to be internally distracted or preoccupied.   Cognition:  Knowledge and intelligence are believed to be average.  Recent and remote memory, fund of knowledge, and abstract reasoning appear grossly intact, appropriate for age and education, and  there are no impairments in attention, concentration, or language.   Insight:  Insight regarding psychiatric conditions is good.   Judgment:  Judgment is good.    Recent Labs:  No results found for this or any previous visit (from the past 24 hour(s)).     Risk Assessment at Discharge:  Violence Risk Assessment: major mental illness, male, and substance abuse  Acute Risk of Harm to Others is Considered: low   Risk Mitigated by: Adherence to treatment, strong therapeutic alliance. Follow-up.    Suicide Risk Assessment: , current psychiatric illness, prior suicide attempt, and substance abuse  Protective Factors against Suicide: adherence to  treatment, hopefulness/future orientation, positive family relationships, sense of responsibility toward family, social support/connectedness, and strong coping skills  Acute Risk of Harm to Self is Considered: low  Risk Mitigated by: Adherence to treatment, strong therapeutic alliance. Follow-up.      DIONICIO Green-CNP

## 2024-07-09 NOTE — CARE PLAN
Problem: Ineffective Coping  Goal: LTG - Verbalizes alternatives to suicide  Outcome: Progressing  Goal: STG - Verbalizes control of suicidal thoughts/behaviors  Outcome: Progressing  Goal: Notifies staff when experiencing harmful thoughts toward self/others  Outcome: Progressing  Goal: Verbalizes improved well being  Outcome: Progressing  Goal: Verbalizes ways to manage anxiety  Outcome: Progressing   The patient's goals for the shift include sleep    The clinical goals for the shift include sleep    Over the shift, the patient did not make progress toward the following goals. Barriers to progression include clinical presentation. Recommendations to address these barriers include encourage medication compliance.    Pt reports today was difficult for him due to an having a group of students on the unit. Pt stated that he felt uncomfortable as this unit isn't big enough to have so many people in the same area. Pt also reported that peers that were discharged today were therapeutic for him as they would talk with him and help him but now they are gone. Pt isolative to room for part of evening but out and watching t.v. later. Pt took HS medications. Pt rested in bed all night.

## 2024-07-09 NOTE — PROGRESS NOTES
Social Work NoteSmarielena Geiger is a 58 y.o. male on day 6 of admission presenting with MDD (major depressive disorder), recurrent severe, without psychosis (Multi).    Subjective   ***       Objective     Physical Exam    Last Recorded Vitals  Blood pressure (!) 131/94, pulse 71, temperature 36.5 °C (97.7 °F), resp. rate 16, SpO2 99%.  Intake/Output last 3 Shifts:  No intake/output data recorded.    Relevant Results  {If you would like to pull in Medications, type .meds     If you would like to pull in Lab results for the last 24 hours, type .[id number]    If you would like to pull in Imaging results, type .imgrslt :99}    {Link to Stroke Scoring tools - Link :99}                       Assessment/Plan   Principal Problem:    MDD (major depressive disorder), recurrent severe, without psychosis (Multi)    ***     {This patient does not have an ACP note on file for this encounter, please fill one out - Advance Care Planning Activity :99}      Rory Calderón LCSW

## 2024-07-09 NOTE — NURSING NOTE
Pt is accepted to the [facility] on [address] without ins. Pt needs to come with 30 day supply of meds. Pt needs to be there by 1pm. Pt to come to [address] [place] [address], door through the gate. Contact is [phone] Brunilda.

## 2024-07-09 NOTE — CARE PLAN
Problem: Ineffective Coping  Goal: STG - Verbalizes control of suicidal thoughts/behaviors  Outcome: Met     Problem: Ineffective Coping  Goal: Verbalizes improved well being  Outcome: Met     Problem: Sensory Perceptual Alteration as Evidenced by  Goal: Participates in unit activities  Outcome: Met     Problem: Potential for Substance Withdrawal  Goal: Free of withdrawal symptoms  Outcome: Met   The patient's goals for the shift include find a residential/tx center    The clinical goals for the shift include no med side effects, attend group      Pt has been up today to meals and groups. Pt an active participant. Pt continues to deny any suicidal thoughts, denies any homicidal thoughts, denies any auditory or visual hallucinations. Pt much more positive about future now that he has been applying to places. Brunilda from [facility] called back that pt was accepted and pt happy about that. Psychiatrist and NP saw pt and discharged. Pt gathered and signed for belongings. Pt given meds form cheema.  Pt given discharge instructions on meds and follow up. Pt verbalized compliance and understanding. Pt awaiting uber.

## 2024-07-09 NOTE — PROGRESS NOTES
"Social Work Note   Met with pt this morning.  Presents as lucid, coherent, A&O x3.  Pleasant and cooperative.  Readily engages in conversation.  Shared hx, education, relationship issues.  Pt has been unable to maintain self sufficiency recently and has been \"dependent\" on younger brother and his wife for housing, financial support.  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  Pt is actively pursuing placement at [facility] to continue Recovery.  Also discussed with pt options in [facility].  Pt was open to [facility].  Faxed pt's info for review.  Pt signed OLMAN.      Pt recognizes his Alcoholism and is aware that it is the base of his inability to be successful.  He reports he has maintained sobriety for up to one month at a time.  Pt is currently sober x1 1/2 wks.    [facility],LSW  "

## 2024-07-10 NOTE — DOCUMENTATION CLARIFICATION NOTE
"    PATIENT:               SUSIE SADLER  ACCT #:                  [id number]  MRN:                       [id number]  :                       1965  ADMIT DATE:       2024 2:22 PM  DISCH DATE:        7/3/2024 10:26 AM  RESPONDING PROVIDER #:        [id number]          PROVIDER RESPONSE TEXT:    Acute Hypoxemic Respiratory Failure    CDI QUERY TEXT:    Clarification    Instruction:    Based on your assessment of the patient and the clinical information, please provide the requested documentation by clicking on the appropriate radio button and enter any additional information if prompted.    Question: Is there a diagnosis indicative of the clinical information    When answering this query, please exercise your independent professional judgment. The fact that a question is being asked, does not imply that any particular answer is desired or expected.    The patient's clinical indicators include:  Clinical Information:  58M presents for AMS    Clinical Indicators:  - SpO2 on admission, : 94 percent on RA  - CC, , Hostoffer: \"Destated to 80s overnight and was placed on non-breather...AHRF/HTN/elevated BNP concerning for alcohol induced cardiomyopathy versus tachycardia/hypertension induced cardiomyopathy...AHRF 2/2 pulmonary edema with fluid overload clinical appearance, CAP.\"    Treatment:  O2 therapy via NRB/NC, CXR, Lasix IV x1    Risk Factors:  Alcohol abuse/withdrawal with delirium  Options provided:  -- Acute Hypoxemic Respiratory Failure  -- No acute respiratory failure  -- Other - I will add my own diagnosis  -- Refer to Clinical Documentation Reviewer    Query created by: Kevin Sullivan on 7/10/2024 11:12 AM      Electronically signed by:  ALEM HART MD 7/10/2024 12:59 PM          "

## 2024-07-19 DIAGNOSIS — S63.289A DISLOCATION OF PROXIMAL INTERPHALANGEAL JOINT OF FINGER, INITIAL ENCOUNTER: Primary | ICD-10-CM

## 2024-07-22 ENCOUNTER — APPOINTMENT (OUTPATIENT)
Dept: ORTHOPEDIC SURGERY | Facility: CLINIC | Age: 59
End: 2024-07-22
Payer: MEDICAID

## 2024-07-22 ENCOUNTER — APPOINTMENT (OUTPATIENT)
Dept: RADIOLOGY | Facility: CLINIC | Age: 59
End: 2024-07-22
Payer: MEDICAID

## 2024-07-29 NOTE — DISCHARGE SUMMARY
Discharge Diagnosis  Alcohol withdrawal syndrome, with delirium (Multi)    Issues Requiring Follow-Up  Alcohol withdrawal syndrome, with delirium (Multi)        Discharge Meds     Your medication list        START taking these medications        Instructions Last Dose Given Next Dose Due   folic acid 1 mg tablet  Commonly known as: Folvite      Take 1 tablet (1 mg) by mouth once daily.       thiamine 100 mg tablet  Commonly known as: Vitamin B-1      Take 1 tablet (100 mg) by mouth once daily.              CONTINUE taking these medications        Instructions Last Dose Given Next Dose Due   multivitamin with minerals tablet           QUEtiapine 25 mg tablet  Commonly known as: SEROquel           sertraline 50 mg tablet  Commonly known as: Zoloft                     Where to Get Your Medications        These medications were [facility] [id number] [facility]  [address] [facility] [address]      Phone: [phone]   folic acid 1 mg tablet  thiamine 100 mg tablet         Test Results Pending At Discharge  Pending Labs       Order Current Status    Extra Urine Gray Tube Collected (06/26/24 1919)    Urinalysis with Reflex Culture and Microscopic In process            Hospital Course   Patient is a 58-year-old M admitted to ER due to alcohol withdrawal syndrome with a CIWA score of more than 20, was started on CIWA protocol evaluated by psych, his condition slowly stabilized,  was discharged to psych [facility] for further care    Pertinent Physical Exam At Time of Discharge  Physical Exam  HENT:      Head: Normocephalic.      Mouth/Throat:      Pharynx: Oropharynx is clear.   Eyes:      Conjunctiva/sclera: Conjunctivae normal.   Cardiovascular:      Rate and Rhythm: Regular rhythm.   Pulmonary:      Breath sounds: Normal breath sounds.   Abdominal:      General: Bowel sounds are normal.      Palpations: Abdomen is soft.   Musculoskeletal:         General: Normal range of  motion.   Skin:     General: Skin is warm and dry.   Neurological:      General: No focal deficit present.      Mental Status: He is alert.   Psychiatric:         Behavior: Behavior normal.         Outpatient Follow-Up    Follow up with PCP      Cyrus Tapia MD

## (undated) DEVICE — GLOVE, SURGICAL, PROTEXIS PI BLUE W/NEUTHERA, 8.0, PF, LF

## (undated) DEVICE — DRAPE, C-ARM, FLUROSCAN, MINI

## (undated) DEVICE — BANDAGE, ELASTIC, MATRIX, SELF-CLOSURE, 2 IN X 5 YD, LF

## (undated) DEVICE — Device

## (undated) DEVICE — SYRINGE, 20 CC, LUER LOCK

## (undated) DEVICE — DRESSING, GAUZE, SPONGE, 12 PLY, 4 X 4 IN, PLASTIC POUCH, STRL 10PK

## (undated) DEVICE — SUTURE, MONOCRYL, 4-0, 18 IN, PS2, UNDYED

## (undated) DEVICE — TOWEL, SURGICAL, NEURO, O/R, 16 X 26, BLUE, STERILE

## (undated) DEVICE — APPLICATOR, CHLORAPREP, W/ORANGE TINT, 26ML

## (undated) DEVICE — PADDING, WEBRIL, UNDERCAST, STERILE, 2IN

## (undated) DEVICE — GLOVE, SURGICAL, PROTEXIS PI , 7.5, PF, LF